# Patient Record
Sex: FEMALE | Race: OTHER
[De-identification: names, ages, dates, MRNs, and addresses within clinical notes are randomized per-mention and may not be internally consistent; named-entity substitution may affect disease eponyms.]

---

## 2017-04-13 ENCOUNTER — RESULT REVIEW (OUTPATIENT)
Age: 22
End: 2017-04-13

## 2017-04-13 ENCOUNTER — INPATIENT (INPATIENT)
Facility: HOSPITAL | Age: 22
LOS: 1 days | Discharge: PSYCHIATRIC OR SUBSTANCE USE FACILITY | End: 2017-04-15
Attending: SURGERY | Admitting: SURGERY
Payer: MEDICAID

## 2017-04-13 VITALS
RESPIRATION RATE: 18 BRPM | DIASTOLIC BLOOD PRESSURE: 67 MMHG | OXYGEN SATURATION: 100 % | SYSTOLIC BLOOD PRESSURE: 125 MMHG | TEMPERATURE: 98 F | HEART RATE: 100 BPM

## 2017-04-13 NOTE — ED ADULT TRIAGE NOTE - TEMPERATURE IN FAHRENHEIT (DEGREES F)
"Chief Complaint   Patient presents with     A.D.H.D     medication follow up     UTI     UTI concern x4 day ago, frequency urinating.        Initial /70 (BP Location: Left arm, Patient Position: Chair)  Pulse 64  Temp 97.5  F (36.4  C) (Oral)  Ht 5' 4.76\" (1.645 m)  Wt 253 lb 6.4 oz (114.9 kg)  LMP 03/27/2017  BMI 42.48 kg/m2 Estimated body mass index is 42.48 kg/(m^2) as calculated from the following:    Height as of this encounter: 5' 4.76\" (1.645 m).    Weight as of this encounter: 253 lb 6.4 oz (114.9 kg).  Medication Reconciliation: complete    " 98.2

## 2017-04-13 NOTE — ED ADULT TRIAGE NOTE - CHIEF COMPLAINT QUOTE
Pt brought in by [facility] from [facility], c/o sudden onset of RUQ abdominal pain x3 hours acc with vomiting x1. Denies fever/chills. Denies diarrhea. Staff members at bedside from [facility].

## 2017-04-14 DIAGNOSIS — K81.0 ACUTE CHOLECYSTITIS: ICD-10-CM

## 2017-04-14 LAB
ALBUMIN SERPL ELPH-MCNC: 4 G/DL — SIGNIFICANT CHANGE UP (ref 3.3–5)
ALP SERPL-CCNC: 91 U/L — SIGNIFICANT CHANGE UP (ref 40–120)
ALT FLD-CCNC: 20 U/L — SIGNIFICANT CHANGE UP (ref 4–33)
APPEARANCE UR: CLEAR — SIGNIFICANT CHANGE UP
APTT BLD: 36.6 SEC — SIGNIFICANT CHANGE UP (ref 27.5–37.4)
AST SERPL-CCNC: 21 U/L — SIGNIFICANT CHANGE UP (ref 4–32)
BASOPHILS # BLD AUTO: 0.01 K/UL — SIGNIFICANT CHANGE UP (ref 0–0.2)
BASOPHILS NFR BLD AUTO: 0.2 % — SIGNIFICANT CHANGE UP (ref 0–2)
BILIRUB SERPL-MCNC: 0.2 MG/DL — SIGNIFICANT CHANGE UP (ref 0.2–1.2)
BILIRUB UR-MCNC: NEGATIVE — SIGNIFICANT CHANGE UP
BLD GP AB SCN SERPL QL: NEGATIVE — SIGNIFICANT CHANGE UP
BLOOD UR QL VISUAL: NEGATIVE — SIGNIFICANT CHANGE UP
BUN SERPL-MCNC: 10 MG/DL — SIGNIFICANT CHANGE UP (ref 7–23)
CALCIUM SERPL-MCNC: 9.1 MG/DL — SIGNIFICANT CHANGE UP (ref 8.4–10.5)
CHLORIDE SERPL-SCNC: 102 MMOL/L — SIGNIFICANT CHANGE UP (ref 98–107)
CO2 SERPL-SCNC: 25 MMOL/L — SIGNIFICANT CHANGE UP (ref 22–31)
COLOR SPEC: YELLOW — SIGNIFICANT CHANGE UP
CREAT SERPL-MCNC: 0.53 MG/DL — SIGNIFICANT CHANGE UP (ref 0.5–1.3)
EOSINOPHIL # BLD AUTO: 0 K/UL — SIGNIFICANT CHANGE UP (ref 0–0.5)
EOSINOPHIL NFR BLD AUTO: 0 % — SIGNIFICANT CHANGE UP (ref 0–6)
GLUCOSE SERPL-MCNC: 103 MG/DL — HIGH (ref 70–99)
GLUCOSE UR-MCNC: NEGATIVE — SIGNIFICANT CHANGE UP
HCT VFR BLD CALC: 35.8 % — SIGNIFICANT CHANGE UP (ref 34.5–45)
HGB BLD-MCNC: 11.3 G/DL — LOW (ref 11.5–15.5)
IMM GRANULOCYTES NFR BLD AUTO: 0 % — SIGNIFICANT CHANGE UP (ref 0–1.5)
INR BLD: 1 — SIGNIFICANT CHANGE UP (ref 0.88–1.17)
KETONES UR-MCNC: NEGATIVE — SIGNIFICANT CHANGE UP
LEUKOCYTE ESTERASE UR-ACNC: NEGATIVE — SIGNIFICANT CHANGE UP
LIDOCAIN IGE QN: 27 U/L — SIGNIFICANT CHANGE UP (ref 7–60)
LYMPHOCYTES # BLD AUTO: 2.16 K/UL — SIGNIFICANT CHANGE UP (ref 1–3.3)
LYMPHOCYTES # BLD AUTO: 45.1 % — HIGH (ref 13–44)
MCHC RBC-ENTMCNC: 24.3 PG — LOW (ref 27–34)
MCHC RBC-ENTMCNC: 31.6 % — LOW (ref 32–36)
MCV RBC AUTO: 77 FL — LOW (ref 80–100)
MONOCYTES # BLD AUTO: 0.5 K/UL — SIGNIFICANT CHANGE UP (ref 0–0.9)
MONOCYTES NFR BLD AUTO: 10.4 % — SIGNIFICANT CHANGE UP (ref 2–14)
NEUTROPHILS # BLD AUTO: 2.12 K/UL — SIGNIFICANT CHANGE UP (ref 1.8–7.4)
NEUTROPHILS NFR BLD AUTO: 44.3 % — SIGNIFICANT CHANGE UP (ref 43–77)
NITRITE UR-MCNC: NEGATIVE — SIGNIFICANT CHANGE UP
PH UR: 6.5 — SIGNIFICANT CHANGE UP (ref 4.6–8)
PLATELET # BLD AUTO: 231 K/UL — SIGNIFICANT CHANGE UP (ref 150–400)
PMV BLD: 11.4 FL — SIGNIFICANT CHANGE UP (ref 7–13)
POTASSIUM SERPL-MCNC: 4.1 MMOL/L — SIGNIFICANT CHANGE UP (ref 3.5–5.3)
POTASSIUM SERPL-SCNC: 4.1 MMOL/L — SIGNIFICANT CHANGE UP (ref 3.5–5.3)
PROT SERPL-MCNC: 7.2 G/DL — SIGNIFICANT CHANGE UP (ref 6–8.3)
PROT UR-MCNC: 10 — SIGNIFICANT CHANGE UP
PROTHROM AB SERPL-ACNC: 11.2 SEC — SIGNIFICANT CHANGE UP (ref 9.8–13.1)
RBC # BLD: 4.65 M/UL — SIGNIFICANT CHANGE UP (ref 3.8–5.2)
RBC # FLD: 16.4 % — HIGH (ref 10.3–14.5)
RBC CASTS # UR COMP ASSIST: SIGNIFICANT CHANGE UP (ref 0–?)
RH IG SCN BLD-IMP: POSITIVE — SIGNIFICANT CHANGE UP
SODIUM SERPL-SCNC: 142 MMOL/L — SIGNIFICANT CHANGE UP (ref 135–145)
SP GR SPEC: 1.01 — SIGNIFICANT CHANGE UP (ref 1–1.03)
UROBILINOGEN FLD QL: NORMAL E.U. — SIGNIFICANT CHANGE UP (ref 0.1–0.2)
VALPROATE SERPL-MCNC: 32.8 UG/ML — LOW (ref 50–100)
WBC # BLD: 4.79 K/UL — SIGNIFICANT CHANGE UP (ref 3.8–10.5)
WBC # FLD AUTO: 4.79 K/UL — SIGNIFICANT CHANGE UP (ref 3.8–10.5)
WBC UR QL: SIGNIFICANT CHANGE UP (ref 0–?)

## 2017-04-14 PROCEDURE — 90792 PSYCH DIAG EVAL W/MED SRVCS: CPT

## 2017-04-14 PROCEDURE — 76705 ECHO EXAM OF ABDOMEN: CPT | Mod: 26

## 2017-04-14 PROCEDURE — 99223 1ST HOSP IP/OBS HIGH 75: CPT | Mod: 57,GC

## 2017-04-14 PROCEDURE — 47562 LAPAROSCOPIC CHOLECYSTECTOMY: CPT

## 2017-04-14 PROCEDURE — 93010 ELECTROCARDIOGRAM REPORT: CPT

## 2017-04-14 PROCEDURE — 88304 TISSUE EXAM BY PATHOLOGIST: CPT | Mod: 26

## 2017-04-14 RX ORDER — OXYCODONE HYDROCHLORIDE 5 MG/1
5 TABLET ORAL EVERY 4 HOURS
Qty: 0 | Refills: 0 | Status: DISCONTINUED | OUTPATIENT
Start: 2017-04-14 | End: 2017-04-15

## 2017-04-14 RX ORDER — HALOPERIDOL DECANOATE 100 MG/ML
5 INJECTION INTRAMUSCULAR EVERY 6 HOURS
Qty: 0 | Refills: 0 | Status: DISCONTINUED | OUTPATIENT
Start: 2017-04-14 | End: 2017-04-15

## 2017-04-14 RX ORDER — MORPHINE SULFATE 50 MG/1
2 CAPSULE, EXTENDED RELEASE ORAL EVERY 4 HOURS
Qty: 0 | Refills: 0 | Status: DISCONTINUED | OUTPATIENT
Start: 2017-04-14 | End: 2017-04-14

## 2017-04-14 RX ORDER — SODIUM CHLORIDE 9 MG/ML
1000 INJECTION INTRAMUSCULAR; INTRAVENOUS; SUBCUTANEOUS ONCE
Qty: 0 | Refills: 0 | Status: COMPLETED | OUTPATIENT
Start: 2017-04-14 | End: 2017-04-14

## 2017-04-14 RX ORDER — PIPERACILLIN AND TAZOBACTAM 4; .5 G/20ML; G/20ML
3.38 INJECTION, POWDER, LYOPHILIZED, FOR SOLUTION INTRAVENOUS ONCE
Qty: 0 | Refills: 0 | Status: COMPLETED | OUTPATIENT
Start: 2017-04-14 | End: 2017-04-14

## 2017-04-14 RX ORDER — PYRIDOXINE HCL (VITAMIN B6) 100 MG
50 TABLET ORAL DAILY
Qty: 0 | Refills: 0 | Status: DISCONTINUED | OUTPATIENT
Start: 2017-04-14 | End: 2017-04-14

## 2017-04-14 RX ORDER — THIAMINE MONONITRATE (VIT B1) 100 MG
100 TABLET ORAL DAILY
Qty: 0 | Refills: 0 | Status: DISCONTINUED | OUTPATIENT
Start: 2017-04-14 | End: 2017-04-14

## 2017-04-14 RX ORDER — SALICYLIC ACID 0.5 %
1 CLEANSER (GRAM) TOPICAL EVERY 6 HOURS
Qty: 0 | Refills: 0 | Status: DISCONTINUED | OUTPATIENT
Start: 2017-04-14 | End: 2017-04-14

## 2017-04-14 RX ORDER — MORPHINE SULFATE 50 MG/1
4 CAPSULE, EXTENDED RELEASE ORAL EVERY 4 HOURS
Qty: 0 | Refills: 0 | Status: DISCONTINUED | OUTPATIENT
Start: 2017-04-14 | End: 2017-04-14

## 2017-04-14 RX ORDER — ONDANSETRON 8 MG/1
4 TABLET, FILM COATED ORAL ONCE
Qty: 0 | Refills: 0 | Status: COMPLETED | OUTPATIENT
Start: 2017-04-14 | End: 2017-04-14

## 2017-04-14 RX ORDER — ACETAMINOPHEN 500 MG
1000 TABLET ORAL ONCE
Qty: 0 | Refills: 0 | Status: COMPLETED | OUTPATIENT
Start: 2017-04-14 | End: 2017-04-14

## 2017-04-14 RX ORDER — OXYCODONE HYDROCHLORIDE 5 MG/1
10 TABLET ORAL EVERY 4 HOURS
Qty: 0 | Refills: 0 | Status: DISCONTINUED | OUTPATIENT
Start: 2017-04-14 | End: 2017-04-15

## 2017-04-14 RX ORDER — LEVOTHYROXINE SODIUM 125 MCG
87 TABLET ORAL DAILY
Qty: 0 | Refills: 0 | Status: DISCONTINUED | OUTPATIENT
Start: 2017-04-14 | End: 2017-04-14

## 2017-04-14 RX ORDER — ENOXAPARIN SODIUM 100 MG/ML
40 INJECTION SUBCUTANEOUS DAILY
Qty: 0 | Refills: 0 | Status: DISCONTINUED | OUTPATIENT
Start: 2017-04-14 | End: 2017-04-15

## 2017-04-14 RX ORDER — SODIUM CHLORIDE 9 MG/ML
1000 INJECTION, SOLUTION INTRAVENOUS
Qty: 0 | Refills: 0 | Status: DISCONTINUED | OUTPATIENT
Start: 2017-04-14 | End: 2017-04-15

## 2017-04-14 RX ORDER — NICOTINE POLACRILEX 2 MG
2 GUM BUCCAL EVERY 6 HOURS
Qty: 0 | Refills: 0 | Status: DISCONTINUED | OUTPATIENT
Start: 2017-04-14 | End: 2017-04-15

## 2017-04-14 RX ORDER — ENOXAPARIN SODIUM 100 MG/ML
40 INJECTION SUBCUTANEOUS DAILY
Qty: 0 | Refills: 0 | Status: DISCONTINUED | OUTPATIENT
Start: 2017-04-14 | End: 2017-04-14

## 2017-04-14 RX ORDER — SALICYLIC ACID 0.5 %
1 CLEANSER (GRAM) TOPICAL EVERY 6 HOURS
Qty: 0 | Refills: 0 | Status: DISCONTINUED | OUTPATIENT
Start: 2017-04-14 | End: 2017-04-15

## 2017-04-14 RX ORDER — HYDROMORPHONE HYDROCHLORIDE 2 MG/ML
0.5 INJECTION INTRAMUSCULAR; INTRAVENOUS; SUBCUTANEOUS
Qty: 0 | Refills: 0 | Status: DISCONTINUED | OUTPATIENT
Start: 2017-04-14 | End: 2017-04-15

## 2017-04-14 RX ORDER — CLOZAPINE 150 MG/1
200 TABLET, ORALLY DISINTEGRATING ORAL AT BEDTIME
Qty: 0 | Refills: 0 | Status: DISCONTINUED | OUTPATIENT
Start: 2017-04-14 | End: 2017-04-14

## 2017-04-14 RX ORDER — DIVALPROEX SODIUM 500 MG/1
500 TABLET, DELAYED RELEASE ORAL DAILY
Qty: 0 | Refills: 0 | Status: DISCONTINUED | OUTPATIENT
Start: 2017-04-14 | End: 2017-04-15

## 2017-04-14 RX ORDER — PIPERACILLIN AND TAZOBACTAM 4; .5 G/20ML; G/20ML
3.38 INJECTION, POWDER, LYOPHILIZED, FOR SOLUTION INTRAVENOUS EVERY 8 HOURS
Qty: 0 | Refills: 0 | Status: DISCONTINUED | OUTPATIENT
Start: 2017-04-14 | End: 2017-04-14

## 2017-04-14 RX ORDER — CLOZAPINE 150 MG/1
200 TABLET, ORALLY DISINTEGRATING ORAL AT BEDTIME
Qty: 0 | Refills: 0 | Status: DISCONTINUED | OUTPATIENT
Start: 2017-04-14 | End: 2017-04-15

## 2017-04-14 RX ORDER — ONDANSETRON 8 MG/1
4 TABLET, FILM COATED ORAL ONCE
Qty: 0 | Refills: 0 | Status: DISCONTINUED | OUTPATIENT
Start: 2017-04-14 | End: 2017-04-15

## 2017-04-14 RX ORDER — MORPHINE SULFATE 50 MG/1
4 CAPSULE, EXTENDED RELEASE ORAL ONCE
Qty: 0 | Refills: 0 | Status: DISCONTINUED | OUTPATIENT
Start: 2017-04-14 | End: 2017-04-14

## 2017-04-14 RX ORDER — LEVOTHYROXINE SODIUM 125 MCG
175 TABLET ORAL DAILY
Qty: 0 | Refills: 0 | Status: DISCONTINUED | OUTPATIENT
Start: 2017-04-14 | End: 2017-04-15

## 2017-04-14 RX ORDER — DIVALPROEX SODIUM 500 MG/1
500 TABLET, DELAYED RELEASE ORAL DAILY
Qty: 0 | Refills: 0 | Status: DISCONTINUED | OUTPATIENT
Start: 2017-04-14 | End: 2017-04-14

## 2017-04-14 RX ORDER — HEXAVITAMINS
300 TABLET ORAL DAILY
Qty: 0 | Refills: 0 | Status: DISCONTINUED | OUTPATIENT
Start: 2017-04-14 | End: 2017-04-15

## 2017-04-14 RX ORDER — THIAMINE MONONITRATE (VIT B1) 100 MG
100 TABLET ORAL DAILY
Qty: 0 | Refills: 0 | Status: DISCONTINUED | OUTPATIENT
Start: 2017-04-14 | End: 2017-04-15

## 2017-04-14 RX ORDER — ACETAMINOPHEN 500 MG
650 TABLET ORAL EVERY 6 HOURS
Qty: 0 | Refills: 0 | Status: DISCONTINUED | OUTPATIENT
Start: 2017-04-14 | End: 2017-04-15

## 2017-04-14 RX ORDER — PYRIDOXINE HCL (VITAMIN B6) 100 MG
50 TABLET ORAL DAILY
Qty: 0 | Refills: 0 | Status: DISCONTINUED | OUTPATIENT
Start: 2017-04-14 | End: 2017-04-15

## 2017-04-14 RX ADMIN — SODIUM CHLORIDE 125 MILLILITER(S): 9 INJECTION, SOLUTION INTRAVENOUS at 12:45

## 2017-04-14 RX ADMIN — SODIUM CHLORIDE 1000 MILLILITER(S): 9 INJECTION INTRAMUSCULAR; INTRAVENOUS; SUBCUTANEOUS at 03:18

## 2017-04-14 RX ADMIN — Medication 100 MILLIGRAM(S): at 14:55

## 2017-04-14 RX ADMIN — Medication 400 MILLIGRAM(S): at 19:15

## 2017-04-14 RX ADMIN — SODIUM CHLORIDE 125 MILLILITER(S): 9 INJECTION, SOLUTION INTRAVENOUS at 07:33

## 2017-04-14 RX ADMIN — Medication 1000 MILLIGRAM(S): at 20:00

## 2017-04-14 RX ADMIN — CLOZAPINE 200 MILLIGRAM(S): 150 TABLET, ORALLY DISINTEGRATING ORAL at 22:30

## 2017-04-14 RX ADMIN — Medication 50 MILLIGRAM(S): at 14:55

## 2017-04-14 RX ADMIN — MORPHINE SULFATE 4 MILLIGRAM(S): 50 CAPSULE, EXTENDED RELEASE ORAL at 04:00

## 2017-04-14 RX ADMIN — Medication 300 MILLIGRAM(S): at 14:54

## 2017-04-14 RX ADMIN — SODIUM CHLORIDE 125 MILLILITER(S): 9 INJECTION, SOLUTION INTRAVENOUS at 19:20

## 2017-04-14 RX ADMIN — Medication 1 APPLICATION(S): at 23:15

## 2017-04-14 RX ADMIN — PIPERACILLIN AND TAZOBACTAM 200 GRAM(S): 4; .5 INJECTION, POWDER, LYOPHILIZED, FOR SOLUTION INTRAVENOUS at 06:02

## 2017-04-14 RX ADMIN — OXYCODONE HYDROCHLORIDE 10 MILLIGRAM(S): 5 TABLET ORAL at 23:10

## 2017-04-14 RX ADMIN — MORPHINE SULFATE 4 MILLIGRAM(S): 50 CAPSULE, EXTENDED RELEASE ORAL at 03:17

## 2017-04-14 RX ADMIN — SODIUM CHLORIDE 125 MILLILITER(S): 9 INJECTION, SOLUTION INTRAVENOUS at 22:31

## 2017-04-14 RX ADMIN — ONDANSETRON 4 MILLIGRAM(S): 8 TABLET, FILM COATED ORAL at 03:18

## 2017-04-14 RX ADMIN — OXYCODONE HYDROCHLORIDE 10 MILLIGRAM(S): 5 TABLET ORAL at 22:30

## 2017-04-14 RX ADMIN — PIPERACILLIN AND TAZOBACTAM 25 GRAM(S): 4; .5 INJECTION, POWDER, LYOPHILIZED, FOR SOLUTION INTRAVENOUS at 12:46

## 2017-04-14 RX ADMIN — ENOXAPARIN SODIUM 40 MILLIGRAM(S): 100 INJECTION SUBCUTANEOUS at 12:44

## 2017-04-14 RX ADMIN — Medication 87 MICROGRAM(S): at 12:44

## 2017-04-14 RX ADMIN — DIVALPROEX SODIUM 500 MILLIGRAM(S): 500 TABLET, DELAYED RELEASE ORAL at 12:45
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
## 2017-04-14 NOTE — BRIEF OPERATIVE NOTE - OPERATION/FINDINGS
Procedure: Laparoscopic cholecystectomy    Findings: An infraumbilical incision was made and carried through the fascia. The abdomen was entered and insufflated. Additional trocars were placed in the subxiphoid and RUQ. The cystic duct and artery were ligated and divided. Gallbladder was removed. There was some bile spillage. Fascia and skin were then closed.

## 2017-04-14 NOTE — ED ADULT NURSE REASSESSMENT NOTE - NS ED NURSE REASSESS COMMENT FT1
pt a&o x3, no c/o pain. fluids infusing. pre-op labs sent. nad noted. surgical resident at bedside for assessment

## 2017-04-14 NOTE — ED PROVIDER NOTE - MEDICAL DECISION MAKING DETAILS
att20 y/o f with h/o asthma, hypoparathyroidism, hypothyroid, obesity, pre-DM, PTSD, s/p thyroid surgery, from [facility], presents with RUQ abd pain x 3 hrs, vomiting. No diarrhea. No dysuria. No fever. Recently tx for uti with nitrofurantan. on depakote. Exam as above, consistent with murphys, r/u acute maci with US, labs, ua, preg, and reassessment. Pain control and ivf. att22 y/o f with h/o asthma, hypoparathyroidism, hypothyroid, obesity, pre-DM, PTSD, s/p thyroid surgery, from [facility], presents with RUQ abd pain x 3 hrs, vomiting. No diarrhea. No dysuria. No fever. Recently tx for uti with nitrofurantan. on depakote. Exam as above, consistent with murphys, r/u acute maci with US, labs, ua, preg, and reassessment. Pain control and ivf. Still with pain, US read, discussed with surgery, abtx, admission

## 2017-04-14 NOTE — H&P ADULT. - HISTORY OF PRESENT ILLNESS
21F with 12 hrs of RUQ pain after dinner and playing basketball associated with nausea and vomiting once. 21F with 12 hrs of RUQ pain after dinner and playing basketball associated with nausea and vomiting once.  The pain is sharp and dull RUQ pain that is unrelenting since last night.  Morphine helped.  It does not radiate.  She has had similar pains before and has known she has had galstones since she was 18.  However, she was pregnant at that time and they were going to perform an elective cholecystectomy.  She is currently planning on future outpatient cholecystectomy at [facility].

## 2017-04-14 NOTE — ED ADULT NURSE NOTE - CHIEF COMPLAINT QUOTE
Pt brought in by [facility] from [facility], c/o sudden onset of RUQ abdominal pain x3 hours acc with vomiting x1. Denies fever/chills. Denies diarrhea. Staff members at bedside from [facility].

## 2017-04-14 NOTE — ED ADULT NURSE NOTE - OBJECTIVE STATEMENT
Pt received in room 29, a/o x3. Pt comes in with c/o right side abd pain that started since last night around 7p. Pt reports the pain just begna suddenly and she had n/v prior to coming to hospital. Pt in no acute distress, reports pain 4/10 and vs as noted. IV access placed, labs sent and pt awaiting US. PT medicated per MD order and NS infusing. Will monitor and await further orders.

## 2017-04-14 NOTE — ED PROVIDER NOTE - PROGRESS NOTE DETAILS
Pt feels better after morphine. R/p abd exam performed. Pt  in RUQ and epigastrium. US read pending.

## 2017-04-14 NOTE — H&P ADULT. - PMH
Asthma    Bipolar disorder, currently in remission    Hypoparathyroidism    Hypothyroid    Obesity    Pre-diabetes    PTSD (post-traumatic stress disorder)

## 2017-04-14 NOTE — H&P ADULT. - ASSESSMENT
21F with acute cholecystitis  -admit to surgery, Dr. Ashley  -NPO/IVF  -IV abx  -DVT ppx  -continue home seizure and mood disorder meds  -continue levothyroxine  -pain control

## 2017-04-14 NOTE — ED PROVIDER NOTE - PMH
Asthma    Hypoparathyroidism    Hypothyroid    Obesity    Pre-diabetes    PTSD (post-traumatic stress disorder)

## 2017-04-14 NOTE — ED PROVIDER NOTE - OBJECTIVE STATEMENT
att22 y/o f with h/o asthma, hypoparathyroidism, hypothyroid, obesity, pre-DM, PTSD, s/p thyroid surgery, from [facility], presents with RUQ abd pain x 3 hrs, vomiting. No diarrhea. No dysuria. No fever. Recently tx for uti with nitrofurantan. on depakote. att22 y/o f with h/o asthma, hypoparathyroidism, hypothyroid, obesity, pre-DM, PTSD, s/p thyroid surgery, from [facility], presents with RUQ abd pain x 3 hrs, vomiting. No diarrhea. No dysuria. No fever. Recently tx for uti with nitrofurantan. on depakote. Pt vomited x 1, NBNB. Pain radiates to the back. No hx of abd surgery. Hx of gallstones.

## 2017-04-15 ENCOUNTER — TRANSCRIPTION ENCOUNTER (OUTPATIENT)
Age: 22
End: 2017-04-15

## 2017-04-15 VITALS
HEART RATE: 103 BPM | OXYGEN SATURATION: 96 % | RESPIRATION RATE: 16 BRPM | TEMPERATURE: 98 F | DIASTOLIC BLOOD PRESSURE: 63 MMHG | SYSTOLIC BLOOD PRESSURE: 118 MMHG

## 2017-04-15 LAB
ALBUMIN SERPL ELPH-MCNC: 3.8 G/DL — SIGNIFICANT CHANGE UP (ref 3.3–5)
ALP SERPL-CCNC: 86 U/L — SIGNIFICANT CHANGE UP (ref 40–120)
ALT FLD-CCNC: 48 U/L — HIGH (ref 4–33)
AST SERPL-CCNC: 74 U/L — HIGH (ref 4–32)
BILIRUB SERPL-MCNC: < 0.2 MG/DL — LOW (ref 0.2–1.2)
BUN SERPL-MCNC: 5 MG/DL — LOW (ref 7–23)
CALCIUM SERPL-MCNC: 9 MG/DL — SIGNIFICANT CHANGE UP (ref 8.4–10.5)
CHLORIDE SERPL-SCNC: 100 MMOL/L — SIGNIFICANT CHANGE UP (ref 98–107)
CO2 SERPL-SCNC: 24 MMOL/L — SIGNIFICANT CHANGE UP (ref 22–31)
CREAT SERPL-MCNC: 0.62 MG/DL — SIGNIFICANT CHANGE UP (ref 0.5–1.3)
GLUCOSE SERPL-MCNC: 195 MG/DL — HIGH (ref 70–99)
HCT VFR BLD CALC: 34.6 % — SIGNIFICANT CHANGE UP (ref 34.5–45)
HGB BLD-MCNC: 11.6 G/DL — SIGNIFICANT CHANGE UP (ref 11.5–15.5)
MCHC RBC-ENTMCNC: 25.8 PG — LOW (ref 27–34)
MCHC RBC-ENTMCNC: 33.5 % — SIGNIFICANT CHANGE UP (ref 32–36)
MCV RBC AUTO: 76.9 FL — LOW (ref 80–100)
PLATELET # BLD AUTO: 238 K/UL — SIGNIFICANT CHANGE UP (ref 150–400)
PMV BLD: 10.9 FL — SIGNIFICANT CHANGE UP (ref 7–13)
POTASSIUM SERPL-MCNC: 4.5 MMOL/L — SIGNIFICANT CHANGE UP (ref 3.5–5.3)
POTASSIUM SERPL-SCNC: 4.5 MMOL/L — SIGNIFICANT CHANGE UP (ref 3.5–5.3)
PROT SERPL-MCNC: 7.1 G/DL — SIGNIFICANT CHANGE UP (ref 6–8.3)
RBC # BLD: 4.5 M/UL — SIGNIFICANT CHANGE UP (ref 3.8–5.2)
RBC # FLD: 16.5 % — HIGH (ref 10.3–14.5)
SODIUM SERPL-SCNC: 141 MMOL/L — SIGNIFICANT CHANGE UP (ref 135–145)
WBC # BLD: 6.85 K/UL — SIGNIFICANT CHANGE UP (ref 3.8–10.5)
WBC # FLD AUTO: 6.85 K/UL — SIGNIFICANT CHANGE UP (ref 3.8–10.5)

## 2017-04-15 RX ORDER — IBUPROFEN 200 MG
600 TABLET ORAL EVERY 8 HOURS
Qty: 0 | Refills: 0 | Status: DISCONTINUED | OUTPATIENT
Start: 2017-04-15 | End: 2017-04-15

## 2017-04-15 RX ORDER — OXYCODONE HYDROCHLORIDE 5 MG/1
5 TABLET ORAL EVERY 6 HOURS
Qty: 0 | Refills: 0 | Status: DISCONTINUED | OUTPATIENT
Start: 2017-04-15 | End: 2017-04-15

## 2017-04-15 RX ORDER — OXYCODONE HYDROCHLORIDE 5 MG/1
1 TABLET ORAL
Qty: 16 | Refills: 0 | OUTPATIENT
Start: 2017-04-15 | End: 2017-04-19

## 2017-04-15 RX ADMIN — Medication 175 MICROGRAM(S): at 05:53

## 2017-04-15 RX ADMIN — Medication 1 APPLICATION(S): at 11:38

## 2017-04-15 RX ADMIN — OXYCODONE HYDROCHLORIDE 5 MILLIGRAM(S): 5 TABLET ORAL at 17:11

## 2017-04-15 RX ADMIN — Medication 100 MILLIGRAM(S): at 11:37

## 2017-04-15 RX ADMIN — Medication 50 MILLIGRAM(S): at 11:37

## 2017-04-15 RX ADMIN — OXYCODONE HYDROCHLORIDE 10 MILLIGRAM(S): 5 TABLET ORAL at 02:45

## 2017-04-15 RX ADMIN — Medication 600 MILLIGRAM(S): at 14:01

## 2017-04-15 RX ADMIN — DIVALPROEX SODIUM 500 MILLIGRAM(S): 500 TABLET, DELAYED RELEASE ORAL at 11:37

## 2017-04-15 RX ADMIN — OXYCODONE HYDROCHLORIDE 10 MILLIGRAM(S): 5 TABLET ORAL at 02:01

## 2017-04-15 RX ADMIN — OXYCODONE HYDROCHLORIDE 5 MILLIGRAM(S): 5 TABLET ORAL at 10:37

## 2017-04-15 RX ADMIN — Medication 1 APPLICATION(S): at 05:55

## 2017-04-15 RX ADMIN — ENOXAPARIN SODIUM 40 MILLIGRAM(S): 100 INJECTION SUBCUTANEOUS at 11:37

## 2017-04-15 RX ADMIN — Medication 300 MILLIGRAM(S): at 11:38

## 2017-04-15 RX ADMIN — OXYCODONE HYDROCHLORIDE 5 MILLIGRAM(S): 5 TABLET ORAL at 09:51

## 2017-04-15 NOTE — DISCHARGE NOTE ADULT - ADDITIONAL INSTRUCTIONS
Please follow up with Dr. Ashley within 1-2 weeks. You may call [phone] to schedule an appointment.    You may resume a regular diet and regular activities. Please do not participate in heavy lifting or strenuous exercise. Do not drive while taking pain medication.    Please go to the Emergency Department if you experience pain not controlled by pain medication, bleeding that will not stop, fevers or chills.

## 2017-04-15 NOTE — DISCHARGE NOTE ADULT - MEDICATION SUMMARY - MEDICATIONS TO TAKE
I will START or STAY ON the medications listed below when I get home from the hospital:    Depakote 500 mg oral delayed release tablet  -- 1 tab(s) by mouth once a day  -- Indication: For Anticonvulsant    Benadryl 50 mg oral capsule  --  by mouth once a day (at bedtime), As Needed insomnia  -- Indication: For Insomnia    cloZAPine 200 mg oral tablet  -- 1 tab(s) by mouth once a day (at bedtime)  -- Scheduled to stop on 4/15/17 - f/u w/ PCP  -- Indication: For Mood    isoniazid 300 mg oral tablet  -- 1 tab(s) by mouth once a day  -- TB prophylaxis  -- Indication: For TB Prophylaxis    Atarax  -- 50 milligram(s) by mouth every 8 hours, As Needed allergies  -- Indication: For Allergies    vitamin A & D topical cream  -- Apply on skin to affected area 4 times a day  -- Intertrigo  -- Indication: For Supplement    ferrous sulfate 325 mg (65 mg elemental iron) oral tablet  -- 1 tab(s) by mouth once a day  -- Take 2 hrs away from other meds  -- Indication: For Supplement    Colace  -- 200 milligram(s) by mouth 2 times a day  -- Indication: For Laxative    senna 8.6 mg oral tablet  -- 1 tab(s) by mouth 2 times a day  -- Indication: For Laxative    nicotine 2 mg oral transmucosal gum  -- 1 tab(s) oral transmucosal every 6 hours  -- Indication: For Smoking cessation    levothyroxine 175 mcg (0.175 mg) oral tablet  -- 1 tab(s) by mouth once a day  -- Indication: For Hypothyroidism    multivitamin  -- 1 tab(s) by mouth once a day  -- Indication: For Supplement    pyridoxine 50 mg oral tablet  -- 1 tab(s) by mouth once a day  -- Indication: For Supplement    thiamine 100 mg oral tablet  -- 1 tab(s) by mouth once a day  -- Indication: For Supplement

## 2017-04-15 NOTE — DISCHARGE NOTE ADULT - PATIENT PORTAL LINK FT
“You can access the Follow[employer] Patient Portal, offered by [facility], by registering with the following website: [email or web address]”

## 2017-04-15 NOTE — PROVIDER CONTACT NOTE (MEDICATION) - ACTION/TREATMENT ORDERED:
MD notified, Provider to RN ok to give pain medication early. MD instructed to retake BP in 30-45 minutes.

## 2017-04-15 NOTE — PROVIDER CONTACT NOTE (MEDICATION) - ASSESSMENT
Patient complaining of 9/10 pain. Patient is not due for pain medication for another 30 mins. Patient's HR was elevated 124.

## 2017-04-15 NOTE — DISCHARGE NOTE ADULT - PLAN OF CARE
Resolution of Pain Please follow up with Dr. Ashley within 1-2 weeks. You may call [phone] to schedule an appointment.    You may resume a regular diet and regular activities. Please do not participate in heavy lifting or strenuous exercise. Do not drive while taking pain medication.    Please go to the Emergency Department if you experience pain not controlled by pain medication, bleeding that will not stop, fevers or chills.

## 2017-04-15 NOTE — DISCHARGE NOTE ADULT - INSTRUCTIONS
You may resume a regular diet. discharged to [facility]. You may resume a regular diet. Please follow up with Dr. Ashley within 1-2 weeks. You may call [phone] to schedule an appointment. Please do not participate in heavy lifting or strenuous exercise.   Please go to the Emergency Department if you experience pain not controlled by pain medication, bleeding that will not stop, fevers or chills.

## 2017-04-15 NOTE — DISCHARGE NOTE ADULT - HOSPITAL COURSE
21 year old female, PMH Asthma, Bipolar disorder, Hypoparathyroidism, Hypothyroidism, Obesity, PTSD, presented to the Emergency Department with 12 hrs of RUQ pain after dinner and playing basketball associated with nausea and one episode of vomiting. The pain was located in the right lower quadrant but did not radiate. Patient had a history of gallstone and biliary colic and was previously offered an elective cholecystectomy. In the Emergency Department, she was treated with morphine, with some pain relief. Ultrasound showed multiple gallstones and acute cholecystitis. The patient was subsequently scheduled for laparoscopic cholecystectomy. On 4/14/17, patient underwent uncomplicated cholecystectomy. Following the operation, pain was controlled with oral pain medications and given a regular diet. On POD#1, the patient was deemed stable discharge.    At the time of discharge, the patient was hemodynamically stable, was tolerating PO diet, was voiding urine and passing stool, was ambulating, and was comfortable with adequate pain control. The patient was instructed to follow up with Dr. Ashley within 1-2 weeks after discharge from the hospital.

## 2017-04-15 NOTE — DISCHARGE NOTE ADULT - CARE PLAN
Principal Discharge DX:	Acute cholecystitis  Goal:	Resolution of Pain  Instructions for follow-up, activity and diet:	Please follow up with Dr. Ashley within 1-2 weeks. You may call [phone] to schedule an appointment.    You may resume a regular diet and regular activities. Please do not participate in heavy lifting or strenuous exercise. Do not drive while taking pain medication.    Please go to the Emergency Department if you experience pain not controlled by pain medication, bleeding that will not stop, fevers or chills. Principal Discharge DX:	Acute cholecystitis  Goal:	Resolution of Pain  Instructions for follow-up, activity and diet:	Please follow up with Dr. Ashley within 1-2 weeks. You may call [phone] to schedule an appointment.    You may resume a regular diet and regular activities. Please do not participate in heavy lifting or strenuous exercise. Do not drive while taking pain medication.    Please go to the Emergency Department if you experience pain not controlled by pain medication, bleeding that will not stop, fevers or chills.

## 2017-04-15 NOTE — DISCHARGE NOTE ADULT - CARE PROVIDER_API CALL
Mesha Ashley), DieticianNutrition; Surgery; Surgical Critical Care  [address] [place] [id number]  Phone: [phone]  Fax: [phone]

## 2017-04-15 NOTE — DISCHARGE NOTE ADULT - OTHER SIGNIFICANT FINDINGS
US: There are multiple gallstones within the gallbladder. Although the  gallbladder wall is not overly thickened, there is a suggestion of trace  pericholecystic fluid. In addition, there is a small echogenic focus along  the gallbladder neck/cystic duct which is not well evaluated due to body  habitus. Sonographic Giles sign was negative, however the patient  reportedly received pain medications. Findings raise the possibility of  cholecystitis, however HIDA is recommended for further evaluation    Hepatomegaly

## 2017-04-15 NOTE — DISCHARGE NOTE ADULT - NS AS ACTIVITY OBS
Do not drive while taking pain medication/Do not make important decisions/Do not drive or operate machinery/Walking-Outdoors allowed/Showering allowed/Walking-Indoors allowed

## 2017-04-20 ENCOUNTER — EMERGENCY (EMERGENCY)
Facility: HOSPITAL | Age: 22
LOS: 1 days | Discharge: HOME | End: 2017-04-20
Attending: EMERGENCY MEDICINE | Admitting: EMERGENCY MEDICINE
Payer: MEDICAID

## 2017-04-20 VITALS
RESPIRATION RATE: 16 BRPM | DIASTOLIC BLOOD PRESSURE: 52 MMHG | TEMPERATURE: 98 F | OXYGEN SATURATION: 99 % | SYSTOLIC BLOOD PRESSURE: 116 MMHG | HEART RATE: 117 BPM

## 2017-04-20 PROCEDURE — 76705 ECHO EXAM OF ABDOMEN: CPT | Mod: 26

## 2017-04-20 PROCEDURE — 99284 EMERGENCY DEPT VISIT MOD MDM: CPT

## 2017-04-20 PROCEDURE — 71020: CPT | Mod: 26

## 2017-04-20 RX ORDER — SODIUM CHLORIDE 9 MG/ML
1000 INJECTION INTRAMUSCULAR; INTRAVENOUS; SUBCUTANEOUS ONCE
Qty: 0 | Refills: 0 | Status: COMPLETED | OUTPATIENT
Start: 2017-04-20 | End: 2017-04-20

## 2017-04-20 RX ORDER — ONDANSETRON 8 MG/1
4 TABLET, FILM COATED ORAL ONCE
Qty: 0 | Refills: 0 | Status: COMPLETED | OUTPATIENT
Start: 2017-04-20 | End: 2017-04-20

## 2017-04-20 RX ORDER — MORPHINE SULFATE 50 MG/1
4 CAPSULE, EXTENDED RELEASE ORAL ONCE
Qty: 0 | Refills: 0 | Status: DISCONTINUED | OUTPATIENT
Start: 2017-04-20 | End: 2017-04-20

## 2017-04-20 RX ADMIN — MORPHINE SULFATE 4 MILLIGRAM(S): 50 CAPSULE, EXTENDED RELEASE ORAL at 23:39

## 2017-04-20 RX ADMIN — SODIUM CHLORIDE 1000 MILLILITER(S): 9 INJECTION INTRAMUSCULAR; INTRAVENOUS; SUBCUTANEOUS at 23:39

## 2017-04-20 RX ADMIN — ONDANSETRON 4 MILLIGRAM(S): 8 TABLET, FILM COATED ORAL at 23:39

## 2017-04-20 NOTE — ED PROVIDER NOTE - MEDICAL DECISION MAKING DETAILS
21F w/ RUQ pain x1 day, s/p cholecystectomy 6 days ago, concerning for retained stone vs post-surg pain vs pyelo  -labs, u/a, US, pain control

## 2017-04-20 NOTE — ED PROVIDER NOTE - OBJECTIVE STATEMENT
21F h/o asthma, Bipolar disorder, Hypoparathyroidism, Hypothyroidism, Obesity, PTSD presenting with abdominal pain. Started earlier today, RUQ, radiating to LUQ, 10/10, constant, associated with NBNB vomiting and watery diarrhea x3 each. Also on antibiotics for UTI diagnosed on 4 days ago. Pt had lap cholecystectomy on 4/14/16 at [facility] with Dr SERG Ashley. Antonia F, CP/SOB.

## 2017-04-20 NOTE — ED PROVIDER NOTE - CONSTITUTIONAL, MLM
normal... appears tired, awake, alert, oriented to person, place, time/situation and in no apparent distress.

## 2017-04-20 NOTE — ED ADULT TRIAGE NOTE - CHIEF COMPLAINT QUOTE
Pt sent from [facility] staff with pt. pt.st" I had gall stones removed last week...tonight I got very bad abd pains and vomiting x3...and diarreah x2." Pt appears very uncomfortable Pt sent from [place] [id number], staffx2 with pt. pt.st" I had gall stones removed last week...tonight I got very bad abd pains and vomiting x3...and diarreah x2." Pt appears very pale, very uncomfortable.

## 2017-04-20 NOTE — ED PROVIDER NOTE - PROGRESS NOTE DETAILS
Klepfish: Very well appearing, sleeping comfortably. CT no acute pathology other than possible urinary issue. UA being processed. Surgery requesting repeat LFTs to reassure that this is not retained stone. Tolerated PO in ED, ambulatory, pain improved, will d/c with pepcid and zofran and advise PMD f/u

## 2017-04-20 NOTE — ED ADULT NURSE NOTE - OBJECTIVE STATEMENT
22 yo female received in spot 26.  Pt is A&OX4.  F h/o asthma, Pt c/o abdominal pain. Started earlier today, RUQ, r10/10, States vomiting and watery diarrhea x3 each.  2 RNs unable to obtain IV.  MD aware.

## 2017-04-20 NOTE — ED ADULT NURSE NOTE - CHIEF COMPLAINT QUOTE
Pt sent from [place] [id number], staffx2 with pt. pt.st" I had gall stones removed last week...tonight I got very bad abd pains and vomiting x3...and diarreah x2." Pt appears very pale, very uncomfortable.

## 2017-04-20 NOTE — ED PROVIDER NOTE - ATTENDING CONTRIBUTION TO CARE
21F h/o asthma, Bipolar disorder, Hypoparathyroidism, Hypothyroidism, Obesity, PTSD presenting with abdominal pain. Started earlier today, RUQ, radiating to LUQ, 10/10, constant, associated with NBNB vomiting and watery diarrhea x3 each. Also on antibiotics for UTI diagnosed on 4 days ago. Pt had lap cholecystectomy on 4/14/16 at [facility] with Dr SERG Ashley. Denies F, CP/SOB. On exam: in pain. obese, lungs and heart normal. Abdo obese, healing lap maci scars, + moderate RUQ and epigastric tenderness. Plan: analgesia, antiemetic, IV fluid, d/w surgery, US abdomen 21F h/o asthma, Bipolar disorder, Hypoparathyroidism, Hypothyroidism, Obesity, PTSD presenting with abdominal pain. Started earlier today, RUQ, radiating to LUQ, 10/10, constant, associated with NBNB vomiting and watery diarrhea x3 each. Also on antibiotics for UTI diagnosed on 4 days ago. Pt had lap cholecystectomy on 4/14/16 at [facility] with Dr SERG Ashley. Denies F, CP/SOB. On exam: in pain. obese, lungs and heart normal. Abdo obese, healing lap maci scars, + moderate RUQ and epigastric tenderness. Plan: analgesia, antiemetic, IV fluid, surgery consulted on arrival, US abdomen ordered.

## 2017-04-21 VITALS
HEART RATE: 102 BPM | OXYGEN SATURATION: 99 % | DIASTOLIC BLOOD PRESSURE: 44 MMHG | RESPIRATION RATE: 17 BRPM | SYSTOLIC BLOOD PRESSURE: 116 MMHG

## 2017-04-21 LAB
ALBUMIN SERPL ELPH-MCNC: 3.8 G/DL — SIGNIFICANT CHANGE UP (ref 3.3–5)
ALBUMIN SERPL ELPH-MCNC: 4.7 G/DL — SIGNIFICANT CHANGE UP (ref 3.3–5)
ALP SERPL-CCNC: 107 U/L — SIGNIFICANT CHANGE UP (ref 40–120)
ALP SERPL-CCNC: 81 U/L — SIGNIFICANT CHANGE UP (ref 40–120)
ALT FLD-CCNC: 27 U/L — SIGNIFICANT CHANGE UP (ref 4–33)
ALT FLD-CCNC: 34 U/L — HIGH (ref 4–33)
APPEARANCE UR: CLEAR — SIGNIFICANT CHANGE UP
AST SERPL-CCNC: 16 U/L — SIGNIFICANT CHANGE UP (ref 4–32)
AST SERPL-CCNC: 24 U/L — SIGNIFICANT CHANGE UP (ref 4–32)
BASOPHILS # BLD AUTO: 0.01 K/UL — SIGNIFICANT CHANGE UP (ref 0–0.2)
BASOPHILS NFR BLD AUTO: 0.1 % — SIGNIFICANT CHANGE UP (ref 0–2)
BILIRUB DIRECT SERPL-MCNC: 0.1 MG/DL — SIGNIFICANT CHANGE UP (ref 0.1–0.2)
BILIRUB SERPL-MCNC: 0.3 MG/DL — SIGNIFICANT CHANGE UP (ref 0.2–1.2)
BILIRUB SERPL-MCNC: 0.3 MG/DL — SIGNIFICANT CHANGE UP (ref 0.2–1.2)
BILIRUB UR-MCNC: NEGATIVE — SIGNIFICANT CHANGE UP
BLOOD UR QL VISUAL: NEGATIVE — SIGNIFICANT CHANGE UP
BUN SERPL-MCNC: 9 MG/DL — SIGNIFICANT CHANGE UP (ref 7–23)
CALCIUM SERPL-MCNC: 9.8 MG/DL — SIGNIFICANT CHANGE UP (ref 8.4–10.5)
CHLORIDE SERPL-SCNC: 101 MMOL/L — SIGNIFICANT CHANGE UP (ref 98–107)
CO2 SERPL-SCNC: 24 MMOL/L — SIGNIFICANT CHANGE UP (ref 22–31)
COLOR SPEC: YELLOW — SIGNIFICANT CHANGE UP
CREAT SERPL-MCNC: 0.68 MG/DL — SIGNIFICANT CHANGE UP (ref 0.5–1.3)
EOSINOPHIL # BLD AUTO: 0 K/UL — SIGNIFICANT CHANGE UP (ref 0–0.5)
EOSINOPHIL NFR BLD AUTO: 0 % — SIGNIFICANT CHANGE UP (ref 0–6)
GLUCOSE SERPL-MCNC: 121 MG/DL — HIGH (ref 70–99)
GLUCOSE UR-MCNC: NEGATIVE — SIGNIFICANT CHANGE UP
HCG SERPL-ACNC: < 5 MIU/ML — SIGNIFICANT CHANGE UP
HCT VFR BLD CALC: 40.6 % — SIGNIFICANT CHANGE UP (ref 34.5–45)
HGB BLD-MCNC: 13 G/DL — SIGNIFICANT CHANGE UP (ref 11.5–15.5)
IMM GRANULOCYTES NFR BLD AUTO: 0.2 % — SIGNIFICANT CHANGE UP (ref 0–1.5)
KETONES UR-MCNC: NEGATIVE — SIGNIFICANT CHANGE UP
LEUKOCYTE ESTERASE UR-ACNC: NEGATIVE — SIGNIFICANT CHANGE UP
LIDOCAIN IGE QN: 20.5 U/L — SIGNIFICANT CHANGE UP (ref 7–60)
LYMPHOCYTES # BLD AUTO: 0.93 K/UL — LOW (ref 1–3.3)
LYMPHOCYTES # BLD AUTO: 8.8 % — LOW (ref 13–44)
MCHC RBC-ENTMCNC: 24.9 PG — LOW (ref 27–34)
MCHC RBC-ENTMCNC: 32 % — SIGNIFICANT CHANGE UP (ref 32–36)
MCV RBC AUTO: 77.6 FL — LOW (ref 80–100)
MONOCYTES # BLD AUTO: 0.9 K/UL — SIGNIFICANT CHANGE UP (ref 0–0.9)
MONOCYTES NFR BLD AUTO: 8.5 % — SIGNIFICANT CHANGE UP (ref 2–14)
MUCOUS THREADS # UR AUTO: SIGNIFICANT CHANGE UP
NEUTROPHILS # BLD AUTO: 8.71 K/UL — HIGH (ref 1.8–7.4)
NEUTROPHILS NFR BLD AUTO: 82.4 % — HIGH (ref 43–77)
NITRITE UR-MCNC: NEGATIVE — SIGNIFICANT CHANGE UP
PH UR: 6.5 — SIGNIFICANT CHANGE UP (ref 4.6–8)
PLATELET # BLD AUTO: 292 K/UL — SIGNIFICANT CHANGE UP (ref 150–400)
PMV BLD: 10.9 FL — SIGNIFICANT CHANGE UP (ref 7–13)
POTASSIUM SERPL-MCNC: 4.1 MMOL/L — SIGNIFICANT CHANGE UP (ref 3.5–5.3)
POTASSIUM SERPL-SCNC: 4.1 MMOL/L — SIGNIFICANT CHANGE UP (ref 3.5–5.3)
PROT SERPL-MCNC: 6.9 G/DL — SIGNIFICANT CHANGE UP (ref 6–8.3)
PROT SERPL-MCNC: 8.8 G/DL — HIGH (ref 6–8.3)
PROT UR-MCNC: 30 — HIGH
RBC # BLD: 5.23 M/UL — HIGH (ref 3.8–5.2)
RBC # FLD: 16.6 % — HIGH (ref 10.3–14.5)
RBC CASTS # UR COMP ASSIST: SIGNIFICANT CHANGE UP (ref 0–?)
SODIUM SERPL-SCNC: 140 MMOL/L — SIGNIFICANT CHANGE UP (ref 135–145)
SP GR SPEC: > 1.04 — HIGH (ref 1–1.03)
SQUAMOUS # UR AUTO: SIGNIFICANT CHANGE UP
UROBILINOGEN FLD QL: NORMAL E.U. — SIGNIFICANT CHANGE UP (ref 0.1–0.2)
WBC # BLD: 10.57 K/UL — HIGH (ref 3.8–10.5)
WBC # FLD AUTO: 10.57 K/UL — HIGH (ref 3.8–10.5)
WBC UR QL: SIGNIFICANT CHANGE UP (ref 0–?)

## 2017-04-21 PROCEDURE — 74177 CT ABD & PELVIS W/CONTRAST: CPT | Mod: 26

## 2017-04-21 RX ORDER — MORPHINE SULFATE 50 MG/1
4 CAPSULE, EXTENDED RELEASE ORAL ONCE
Qty: 0 | Refills: 0 | Status: DISCONTINUED | OUTPATIENT
Start: 2017-04-21 | End: 2017-04-21

## 2017-04-21 RX ORDER — SODIUM CHLORIDE 9 MG/ML
1000 INJECTION INTRAMUSCULAR; INTRAVENOUS; SUBCUTANEOUS ONCE
Qty: 0 | Refills: 0 | Status: COMPLETED | OUTPATIENT
Start: 2017-04-21 | End: 2017-04-21

## 2017-04-21 RX ADMIN — MORPHINE SULFATE 4 MILLIGRAM(S): 50 CAPSULE, EXTENDED RELEASE ORAL at 04:11

## 2017-04-21 RX ADMIN — MORPHINE SULFATE 4 MILLIGRAM(S): 50 CAPSULE, EXTENDED RELEASE ORAL at 00:11

## 2017-04-21 RX ADMIN — SODIUM CHLORIDE 2000 MILLILITER(S): 9 INJECTION INTRAMUSCULAR; INTRAVENOUS; SUBCUTANEOUS at 04:11

## 2017-04-22 LAB
BACTERIA UR CULT: SIGNIFICANT CHANGE UP
SPECIMEN SOURCE: SIGNIFICANT CHANGE UP

## 2017-04-27 ENCOUNTER — TRANSCRIPTION ENCOUNTER (OUTPATIENT)
Age: 22
End: 2017-04-27

## 2017-04-27 ENCOUNTER — APPOINTMENT (OUTPATIENT)
Dept: TRAUMA SURGERY | Facility: CLINIC | Age: 22
End: 2017-04-27

## 2017-04-27 VITALS
HEIGHT: 59 IN | WEIGHT: 235 LBS | BODY MASS INDEX: 47.37 KG/M2 | DIASTOLIC BLOOD PRESSURE: 80 MMHG | TEMPERATURE: 98.7 F | HEART RATE: 94 BPM | SYSTOLIC BLOOD PRESSURE: 115 MMHG

## 2017-06-02 ENCOUNTER — EMERGENCY (EMERGENCY)
Facility: HOSPITAL | Age: 22
LOS: 1 days | Discharge: HOME | End: 2017-06-02
Attending: EMERGENCY MEDICINE | Admitting: EMERGENCY MEDICINE
Payer: MEDICAID

## 2017-06-02 VITALS
TEMPERATURE: 98 F | RESPIRATION RATE: 18 BRPM | HEART RATE: 110 BPM | OXYGEN SATURATION: 100 % | DIASTOLIC BLOOD PRESSURE: 65 MMHG | SYSTOLIC BLOOD PRESSURE: 117 MMHG

## 2017-06-02 VITALS
TEMPERATURE: 98 F | RESPIRATION RATE: 18 BRPM | HEART RATE: 93 BPM | OXYGEN SATURATION: 100 % | SYSTOLIC BLOOD PRESSURE: 101 MMHG | DIASTOLIC BLOOD PRESSURE: 56 MMHG

## 2017-06-02 LAB
BASOPHILS # BLD AUTO: 0 K/UL — SIGNIFICANT CHANGE UP (ref 0–0.2)
BASOPHILS NFR BLD AUTO: 0 % — SIGNIFICANT CHANGE UP (ref 0–2)
BUN SERPL-MCNC: 8 MG/DL — SIGNIFICANT CHANGE UP (ref 7–23)
CALCIUM SERPL-MCNC: 9.6 MG/DL — SIGNIFICANT CHANGE UP (ref 8.4–10.5)
CHLORIDE SERPL-SCNC: 102 MMOL/L — SIGNIFICANT CHANGE UP (ref 98–107)
CO2 SERPL-SCNC: 28 MMOL/L — SIGNIFICANT CHANGE UP (ref 22–31)
CREAT SERPL-MCNC: 0.6 MG/DL — SIGNIFICANT CHANGE UP (ref 0.5–1.3)
EOSINOPHIL # BLD AUTO: 0 K/UL — SIGNIFICANT CHANGE UP (ref 0–0.5)
EOSINOPHIL NFR BLD AUTO: 0 % — SIGNIFICANT CHANGE UP (ref 0–6)
GLUCOSE SERPL-MCNC: 93 MG/DL — SIGNIFICANT CHANGE UP (ref 70–99)
HCT VFR BLD CALC: 37 % — SIGNIFICANT CHANGE UP (ref 34.5–45)
HGB BLD-MCNC: 11.6 G/DL — SIGNIFICANT CHANGE UP (ref 11.5–15.5)
IMM GRANULOCYTES NFR BLD AUTO: 0.3 % — SIGNIFICANT CHANGE UP (ref 0–1.5)
LYMPHOCYTES # BLD AUTO: 2.54 K/UL — SIGNIFICANT CHANGE UP (ref 1–3.3)
LYMPHOCYTES # BLD AUTO: 27.8 % — SIGNIFICANT CHANGE UP (ref 13–44)
MCHC RBC-ENTMCNC: 24.7 PG — LOW (ref 27–34)
MCHC RBC-ENTMCNC: 31.4 % — LOW (ref 32–36)
MCV RBC AUTO: 78.9 FL — LOW (ref 80–100)
MONOCYTES # BLD AUTO: 0.59 K/UL — SIGNIFICANT CHANGE UP (ref 0–0.9)
MONOCYTES NFR BLD AUTO: 6.4 % — SIGNIFICANT CHANGE UP (ref 2–14)
NEUTROPHILS # BLD AUTO: 5.99 K/UL — SIGNIFICANT CHANGE UP (ref 1.8–7.4)
NEUTROPHILS NFR BLD AUTO: 65.5 % — SIGNIFICANT CHANGE UP (ref 43–77)
PLATELET # BLD AUTO: 340 K/UL — SIGNIFICANT CHANGE UP (ref 150–400)
PMV BLD: 10.4 FL — SIGNIFICANT CHANGE UP (ref 7–13)
POTASSIUM SERPL-MCNC: 4 MMOL/L — SIGNIFICANT CHANGE UP (ref 3.5–5.3)
POTASSIUM SERPL-SCNC: 4 MMOL/L — SIGNIFICANT CHANGE UP (ref 3.5–5.3)
RBC # BLD: 4.69 M/UL — SIGNIFICANT CHANGE UP (ref 3.8–5.2)
RBC # FLD: 15.5 % — HIGH (ref 10.3–14.5)
SODIUM SERPL-SCNC: 144 MMOL/L — SIGNIFICANT CHANGE UP (ref 135–145)
WBC # BLD: 9.15 K/UL — SIGNIFICANT CHANGE UP (ref 3.8–10.5)
WBC # FLD AUTO: 9.15 K/UL — SIGNIFICANT CHANGE UP (ref 3.8–10.5)

## 2017-06-02 PROCEDURE — 71020: CPT | Mod: 26

## 2017-06-02 PROCEDURE — 99284 EMERGENCY DEPT VISIT MOD MDM: CPT

## 2017-06-02 RX ORDER — SODIUM CHLORIDE 9 MG/ML
1000 INJECTION INTRAMUSCULAR; INTRAVENOUS; SUBCUTANEOUS ONCE
Qty: 0 | Refills: 0 | Status: COMPLETED | OUTPATIENT
Start: 2017-06-02 | End: 2017-06-02

## 2017-06-02 RX ORDER — ONDANSETRON 8 MG/1
4 TABLET, FILM COATED ORAL ONCE
Qty: 0 | Refills: 0 | Status: COMPLETED | OUTPATIENT
Start: 2017-06-02 | End: 2017-06-02

## 2017-06-02 RX ADMIN — ONDANSETRON 4 MILLIGRAM(S): 8 TABLET, FILM COATED ORAL at 17:35

## 2017-06-02 RX ADMIN — SODIUM CHLORIDE 1000 MILLILITER(S): 9 INJECTION INTRAMUSCULAR; INTRAVENOUS; SUBCUTANEOUS at 18:21

## 2017-06-02 RX ADMIN — SODIUM CHLORIDE 1000 MILLILITER(S): 9 INJECTION INTRAMUSCULAR; INTRAVENOUS; SUBCUTANEOUS at 17:35

## 2017-06-02 NOTE — ED ADULT TRIAGE NOTE - CHIEF COMPLAINT QUOTE
states" I am having an URI since 3 days and now see some blood on coughing" .PMH  psych history, substance abuse . , hyper thyroidism, bipolar, depression, cholecystectomy

## 2017-06-02 NOTE — ED PROVIDER NOTE - PLAN OF CARE
Follow up with your PMD within 48-72 hours. Clinic number: [phone].  Find a doctor: [phone].   Rest.  No sports or strenuous activities. Drink plenty of fluids. Take tylenol 650mg every 6 hours for pain or temp greater than 100.   May add ibuprofen 600 mg every 8 hours if you still have fevers or pain. take with food.  Return to the Emergency Department immediately for any worsening or continued fever, chills, weakness, nausea, vomiting, diarrhea, abdominal pain, headaches, neck stiffness, chest pain or shortness of breath.

## 2017-06-02 NOTE — ED PROVIDER NOTE - PROGRESS NOTE DETAILS
Patient signed out to me.  22F with likely URI, vitals improved, CXR.  Discharge home with supportive care and return precautions.

## 2017-06-02 NOTE — ED PROVIDER NOTE - MEDICAL DECISION MAKING DETAILS
21 y/o female with uri, gastro sx, hemoptysis.  Suspect viral syndrome, likely bronchitis, pna a consideration.  Obtain cbc, bmp, cxr, give ivf bolus, antiemetic, nsaids, reassess. 21 y/o female with uri, gastro sx, hemoptysis.  Suspect viral syndrome, likely bronchitis, pna a consideration as is tb.  Obtain cbc, bmp, cxr, give ivf bolus, antiemetic, nsaids, reassess.

## 2017-06-02 NOTE — ED PROVIDER NOTE - OBJECTIVE STATEMENT
23 y/o female h/o bipolar d/o, hypothyroidism here with cough prod of yellow mucus, sore throat, runny nose, sinus pressure x3 days with one day of n/v nbnb x3, diarrhea no blood or mucus x5-7, hemoptysis brb after coughing fit today.  States temp "one hundred something" at home today.  Did not take any antipyretics.  Denies neck stiffness, cp, sob, abd pain, dysuria, rash.

## 2017-06-02 NOTE — ED PROVIDER NOTE - CARE PLAN
Principal Discharge DX:	Viral syndrome  Instructions for follow-up, activity and diet:	Follow up with your PMD within 48-72 hours. Clinic number: [phone].  Find a doctor: [phone].   Rest.  No sports or strenuous activities. Drink plenty of fluids. Take tylenol 650mg every 6 hours for pain or temp greater than 100.   May add ibuprofen 600 mg every 8 hours if you still have fevers or pain. take with food.  Return to the Emergency Department immediately for any worsening or continued fever, chills, weakness, nausea, vomiting, diarrhea, abdominal pain, headaches, neck stiffness, chest pain or shortness of breath. Principal Discharge DX:	Viral syndrome

## 2017-06-07 ENCOUNTER — EMERGENCY (EMERGENCY)
Facility: HOSPITAL | Age: 22
LOS: 1 days | Discharge: HOME | End: 2017-06-07
Attending: EMERGENCY MEDICINE | Admitting: EMERGENCY MEDICINE
Payer: MEDICAID

## 2017-06-07 VITALS
OXYGEN SATURATION: 98 % | RESPIRATION RATE: 22 BRPM | DIASTOLIC BLOOD PRESSURE: 28 MMHG | HEART RATE: 106 BPM | TEMPERATURE: 99 F | SYSTOLIC BLOOD PRESSURE: 94 MMHG

## 2017-06-07 VITALS — HEIGHT: 59 IN | WEIGHT: 244.93 LBS

## 2017-06-07 DIAGNOSIS — F43.21 ADJUSTMENT DISORDER WITH DEPRESSED MOOD: ICD-10-CM

## 2017-06-07 DIAGNOSIS — F12.21 CANNABIS DEPENDENCE, IN REMISSION: ICD-10-CM

## 2017-06-07 DIAGNOSIS — F43.10 POST-TRAUMATIC STRESS DISORDER, UNSPECIFIED: ICD-10-CM

## 2017-06-07 LAB
ALBUMIN SERPL ELPH-MCNC: 4.3 G/DL — SIGNIFICANT CHANGE UP (ref 3.3–5)
ALP SERPL-CCNC: 122 U/L — HIGH (ref 40–120)
ALT FLD-CCNC: 32 U/L — SIGNIFICANT CHANGE UP (ref 4–33)
AMPHET UR-MCNC: NEGATIVE — SIGNIFICANT CHANGE UP
APAP SERPL-MCNC: < 15 UG/ML — LOW (ref 15–25)
APPEARANCE UR: CLEAR — SIGNIFICANT CHANGE UP
AST SERPL-CCNC: 26 U/L — SIGNIFICANT CHANGE UP (ref 4–32)
BACTERIA # UR AUTO: SIGNIFICANT CHANGE UP
BARBITURATES MEASUREMENT: NEGATIVE — SIGNIFICANT CHANGE UP
BARBITURATES UR SCN-MCNC: NEGATIVE — SIGNIFICANT CHANGE UP
BASOPHILS # BLD AUTO: 0.01 K/UL — SIGNIFICANT CHANGE UP (ref 0–0.2)
BASOPHILS NFR BLD AUTO: 0.1 % — SIGNIFICANT CHANGE UP (ref 0–2)
BENZODIAZ SERPL-MCNC: NEGATIVE — SIGNIFICANT CHANGE UP
BENZODIAZ UR-MCNC: POSITIVE — SIGNIFICANT CHANGE UP
BILIRUB SERPL-MCNC: 0.2 MG/DL — SIGNIFICANT CHANGE UP (ref 0.2–1.2)
BILIRUB UR-MCNC: NEGATIVE — SIGNIFICANT CHANGE UP
BLOOD UR QL VISUAL: HIGH
BUN SERPL-MCNC: 12 MG/DL — SIGNIFICANT CHANGE UP (ref 7–23)
CALCIUM SERPL-MCNC: 9 MG/DL — SIGNIFICANT CHANGE UP (ref 8.4–10.5)
CANNABINOIDS UR-MCNC: NEGATIVE — SIGNIFICANT CHANGE UP
CHLORIDE SERPL-SCNC: 101 MMOL/L — SIGNIFICANT CHANGE UP (ref 98–107)
CO2 SERPL-SCNC: 22 MMOL/L — SIGNIFICANT CHANGE UP (ref 22–31)
COCAINE METAB.OTHER UR-MCNC: NEGATIVE — SIGNIFICANT CHANGE UP
COLOR SPEC: YELLOW — SIGNIFICANT CHANGE UP
CREAT SERPL-MCNC: 0.67 MG/DL — SIGNIFICANT CHANGE UP (ref 0.5–1.3)
EOSINOPHIL # BLD AUTO: 0 K/UL — SIGNIFICANT CHANGE UP (ref 0–0.5)
EOSINOPHIL NFR BLD AUTO: 0 % — SIGNIFICANT CHANGE UP (ref 0–6)
ETHANOL BLD-MCNC: < 10 MG/DL — SIGNIFICANT CHANGE UP
GLUCOSE SERPL-MCNC: 161 MG/DL — HIGH (ref 70–99)
GLUCOSE UR-MCNC: NEGATIVE — SIGNIFICANT CHANGE UP
HCG SERPL-ACNC: < 5 MIU/ML — SIGNIFICANT CHANGE UP
HCT VFR BLD CALC: 37.5 % — SIGNIFICANT CHANGE UP (ref 34.5–45)
HGB BLD-MCNC: 11.9 G/DL — SIGNIFICANT CHANGE UP (ref 11.5–15.5)
IMM GRANULOCYTES NFR BLD AUTO: 0.3 % — SIGNIFICANT CHANGE UP (ref 0–1.5)
KETONES UR-MCNC: SIGNIFICANT CHANGE UP
LEUKOCYTE ESTERASE UR-ACNC: NEGATIVE — SIGNIFICANT CHANGE UP
LYMPHOCYTES # BLD AUTO: 27.3 % — SIGNIFICANT CHANGE UP (ref 13–44)
LYMPHOCYTES # BLD AUTO: 3.84 K/UL — HIGH (ref 1–3.3)
MCHC RBC-ENTMCNC: 24.4 PG — LOW (ref 27–34)
MCHC RBC-ENTMCNC: 31.7 % — LOW (ref 32–36)
MCV RBC AUTO: 77 FL — LOW (ref 80–100)
METHADONE UR-MCNC: NEGATIVE — SIGNIFICANT CHANGE UP
MONOCYTES # BLD AUTO: 0.85 K/UL — SIGNIFICANT CHANGE UP (ref 0–0.9)
MONOCYTES NFR BLD AUTO: 6 % — SIGNIFICANT CHANGE UP (ref 2–14)
NEUTROPHILS # BLD AUTO: 9.31 K/UL — HIGH (ref 1.8–7.4)
NEUTROPHILS NFR BLD AUTO: 66.3 % — SIGNIFICANT CHANGE UP (ref 43–77)
NITRITE UR-MCNC: NEGATIVE — SIGNIFICANT CHANGE UP
OPIATES UR-MCNC: NEGATIVE — SIGNIFICANT CHANGE UP
OXYCODONE UR-MCNC: NEGATIVE — SIGNIFICANT CHANGE UP
PCP UR-MCNC: NEGATIVE — SIGNIFICANT CHANGE UP
PH UR: 6 — SIGNIFICANT CHANGE UP (ref 4.6–8)
PLATELET # BLD AUTO: 375 K/UL — SIGNIFICANT CHANGE UP (ref 150–400)
PMV BLD: 10.3 FL — SIGNIFICANT CHANGE UP (ref 7–13)
POTASSIUM SERPL-MCNC: 3.4 MMOL/L — LOW (ref 3.5–5.3)
POTASSIUM SERPL-SCNC: 3.4 MMOL/L — LOW (ref 3.5–5.3)
PROT SERPL-MCNC: 7.6 G/DL — SIGNIFICANT CHANGE UP (ref 6–8.3)
PROT UR-MCNC: 20 — SIGNIFICANT CHANGE UP
RBC # BLD: 4.87 M/UL — SIGNIFICANT CHANGE UP (ref 3.8–5.2)
RBC # FLD: 14.7 % — HIGH (ref 10.3–14.5)
RBC CASTS # UR COMP ASSIST: SIGNIFICANT CHANGE UP (ref 0–?)
SALICYLATES SERPL-MCNC: < 5 MG/DL — LOW (ref 15–30)
SODIUM SERPL-SCNC: 143 MMOL/L — SIGNIFICANT CHANGE UP (ref 135–145)
SP GR SPEC: 1.02 — SIGNIFICANT CHANGE UP (ref 1–1.03)
SQUAMOUS # UR AUTO: SIGNIFICANT CHANGE UP
T3 SERPL-MCNC: 124 NG/DL — SIGNIFICANT CHANGE UP (ref 80–200)
T4 FREE SERPL-MCNC: 0.8 NG/DL — LOW (ref 0.9–1.8)
TSH SERPL-MCNC: 83.85 UIU/ML — HIGH (ref 0.27–4.2)
UROBILINOGEN FLD QL: NORMAL E.U. — SIGNIFICANT CHANGE UP (ref 0.1–0.2)
WBC # BLD: 14.05 K/UL — HIGH (ref 3.8–10.5)
WBC # FLD AUTO: 14.05 K/UL — HIGH (ref 3.8–10.5)
WBC CLUMPS #/AREA URNS HPF: PRESENT — HIGH (ref 0–?)
WBC UR QL: SIGNIFICANT CHANGE UP (ref 0–?)

## 2017-06-07 PROCEDURE — 90792 PSYCH DIAG EVAL W/MED SRVCS: CPT | Mod: GC

## 2017-06-07 PROCEDURE — 99285 EMERGENCY DEPT VISIT HI MDM: CPT | Mod: 25

## 2017-06-07 PROCEDURE — 12005 RPR S/N/A/GEN/TRK12.6-20.0CM: CPT

## 2017-06-07 PROCEDURE — 93010 ELECTROCARDIOGRAM REPORT: CPT

## 2017-06-07 RX ORDER — SODIUM CHLORIDE 9 MG/ML
3 INJECTION INTRAMUSCULAR; INTRAVENOUS; SUBCUTANEOUS ONCE
Qty: 0 | Refills: 0 | Status: COMPLETED | OUTPATIENT
Start: 2017-06-07 | End: 2017-06-07

## 2017-06-07 RX ORDER — DIPHENHYDRAMINE HCL 50 MG
50 CAPSULE ORAL ONCE
Qty: 0 | Refills: 0 | Status: COMPLETED | OUTPATIENT
Start: 2017-06-07 | End: 2017-06-07

## 2017-06-07 RX ORDER — CHLORPROMAZINE HCL 10 MG
50 TABLET ORAL ONCE
Qty: 0 | Refills: 0 | Status: COMPLETED | OUTPATIENT
Start: 2017-06-07 | End: 2017-06-07

## 2017-06-07 RX ORDER — HALOPERIDOL DECANOATE 100 MG/ML
5 INJECTION INTRAMUSCULAR ONCE
Qty: 0 | Refills: 0 | Status: COMPLETED | OUTPATIENT
Start: 2017-06-07 | End: 2017-06-07

## 2017-06-07 RX ADMIN — Medication 50 MILLIGRAM(S): at 01:00

## 2017-06-07 RX ADMIN — Medication 2 MILLIGRAM(S): at 01:00

## 2017-06-07 RX ADMIN — HALOPERIDOL DECANOATE 5 MILLIGRAM(S): 100 INJECTION INTRAMUSCULAR at 01:00

## 2017-06-07 RX ADMIN — Medication 50 MILLIGRAM(S): at 01:15

## 2017-06-07 NOTE — ED BEHAVIORAL HEALTH ASSESSMENT NOTE - DESCRIPTION (FIRST USE, LAST USE, QUANTITY, FREQUENCY, DURATION)
Patient says she used to drink, but not frequently or in excess. Patient says that she stopped drinking 8 months ago. The nurse at the patient's facility says that the patient has a history of alcohol use disorder. Patient used to smoke 6 blunts of marijuana daily until 8 months ago.
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
## 2017-06-07 NOTE — ED BEHAVIORAL HEALTH ASSESSMENT NOTE - DIFFERENTIAL
Adjustment disorder with depressed mood and disturbance in conduct.  Borderline personality disorder.  Acute stress disorder.  Major depressive disorder.  Bipolar disorder. post-traumatic stress disorder   Borderline personality disorder.  Major depressive disorder.  Bipolar disorder.
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
## 2017-06-07 NOTE — ED PROVIDER NOTE - CARE PLAN
Principal Discharge DX:	Laceration of multiple sites of left upper extremity, initial encounter  Secondary Diagnosis:	Adjustment disorder with depressed mood
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
## 2017-06-07 NOTE — ED PROCEDURE NOTE - DETAILS:
Dr. Nina: I personally supervised this procedure performed by the PA and I agree with the above documentation.

## 2017-06-07 NOTE — ED ADULT NURSE REASSESSMENT NOTE - GENERAL PATIENT STATE
receievd pt awake, ambulating unassisted, calm with flat affect from medical ed s/p suture placement for self harm

## 2017-06-07 NOTE — ED ADULT TRIAGE NOTE - CHIEF COMPLAINT QUOTE
Pt arrives via [employer] escort in cuffs, Arrives screaming "I want to die, I just want to die...pt extremly agitated, thrashing on stretcher ,+ mult lacerations on left forearm." As PER EMT received versed 10mg enroute.

## 2017-06-07 NOTE — ED BEHAVIORAL HEALTH ASSESSMENT NOTE - OTHER PAST PSYCHIATRIC HISTORY (INCLUDE DETAILS REGARDING ONSET, COURSE OF ILLNESS, INPATIENT/OUTPATIENT TREATMENT)
Patient has a prior diagnosis of bipolar disorder, alcohol use disorder, cannabis use disorder, and rule out borderline personality disorder. She has a history of 10 prior inpatient psychiatric hospitalizations, most recently at [facility] in 11/2016 for suicidality.

## 2017-06-07 NOTE — PROVIDER CONTACT NOTE (OTHER) - ASSESSMENT
Pt stable to return to [facility].  [facility] transport called [phone].  auth#[id number].  p/u at 1145am via [employer] ride ambulette.
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
## 2017-06-07 NOTE — ED BEHAVIORAL HEALTH ASSESSMENT NOTE - CURRENT MEDICATION
Clozapine 100 mg PO BID, Depakote 500 mg PO QHS, isoniazid 300 mg PO daily, Synthroid 0.1mg PO daily, 0.075 mg PO daily, ferrous sulfate 325 mg PO QHS, pyridoxine 50 mg PO daily, ergocalciferol 5000 units PO once a week on Wednesday, Benadryl 50 mg PO QHS PRN insomnia, multivit 1 tab PO daily, Colace 100 mg PO BID, Pepcid 20 mg PO BID, Proventil HFA inhale 2 puffs every 4-6 hours PRN SOB/wheezing, Tylenol 650 mg PO TID daily PRN pain, bismuth subsalicylate 262 mg 2 tablespoons PO TID PRN, petrolatum ointment 1 packet TID daily on the skin PRN.

## 2017-06-07 NOTE — ED ADULT NURSE REASSESSMENT NOTE - COMFORT CARE
warm blanket provided/wait time explained
warm blanket provided/side rails up/repositioned/po fluids offered

## 2017-06-07 NOTE — ED BEHAVIORAL HEALTH ASSESSMENT NOTE - SUMMARY
22 year old female with prior diagnosis of bipolar disorder, alcohol use disorder, cannabis use disorder, rule out borderline personality disorder, 10 prior inpatient psychiatric hospitalizations, reported prior suicide attempts, presenting after patient cut her left forearm after discussing the trauma of having a dilatation and curettage. She developed depressed mood and self injurious behavior in reaction to marked distress out of proportion of the stress of talking about her prior trauma, consistent with an adjustment disorder with depressed mood and disturbance in conduct. She also has some symptoms of acute stress disorder, not meeting full criteria for the disorder. The patient denies that cutting herself was a suicide attempt, is future-oriented, and feels bad about having scared the people she lives with. 22 year old female with prior diagnosis of bipolar disorder, alcohol use disorder, cannabis use disorder, rule out borderline personality disorder, 10 prior inpatient psychiatric hospitalizations, reported prior suicide attempts, presenting after patient cut her left forearm after discussing the trauma of having a dilatation and curettage. She developed depressed mood and self injurious behavior in reaction to marked distress out of proportion of the stress of talking about her prior trauma, consistent with an adjustment disorder with depressed mood and disturbance in conduct. She also has some symptoms of acute stress disorder, not meeting full criteria for the disorder. Although the triage note says that the patient's cutting herself was a suicide attempt, the patient denies that cutting herself was a suicide attempt, is future-oriented, and feels bad about having scared the people she lives with. 22 year old female with prior diagnosis of bipolar disorder, alcohol use disorder, cannabis use disorder, rule out borderline personality disorder, 10 prior inpatient psychiatric hospitalizations, reported prior suicide attempts, presenting after patient cut her left forearm after discussing the trauma of having a dilatation and curettage. She developed depressed mood and self injurious behavior in reaction to marked distress out of proportion of the stress of talking about her prior trauma, consistent with some symptoms of post-traumatic stress disorder. Although the triage note says that the patient's cutting herself was a suicide attempt, the patient denies that cutting herself was a suicide attempt, reported she was trying to distract herself from her emotional pain, is future-oriented, and feels bad about having scared the people she lives with.
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
## 2017-06-07 NOTE — ED BEHAVIORAL HEALTH ASSESSMENT NOTE - SUICIDE PROTECTIVE FACTORS
Supportive social network or family/Engaged in work or school/Future oriented/Identifies reasons for living

## 2017-06-07 NOTE — ED PROVIDER NOTE - OBJECTIVE STATEMENT
21 y/o female hx bipolar PTSD hypothyroid presents to ED from [facility] for suicide attempt today. Pt. [place] by [place]/[employer] agitated and thrashing on grounds of [facility] with multiple lacerations to her left arm and shards of glass surroundg her on floor. Pt. was restrained by EMS and [employer] and brought into ED restrained very agitated and craying. given versed 10mg in the field. Pt. currently agitated and crying and unable to obtain any history from patient.

## 2017-06-07 NOTE — ED PROVIDER NOTE - PROGRESS NOTE DETAILS
TSH result: 83 - last TSH in [place] from 2009 = 8.1  Called lab and ordered on t3/t4. 18 sutures placed to left forearm - sutures need to be removed in 7 days either in ED or with PMD

## 2017-06-07 NOTE — ED BEHAVIORAL HEALTH ASSESSMENT NOTE - OTHER
Ms. Dooley ([phone]), nurse at [facility]. Taking classes for GED. Ms. Dooley ([phone]), nurse at [facility].

## 2017-06-07 NOTE — ED BEHAVIORAL HEALTH ASSESSMENT NOTE - SAFETY PLAN DETAILS
If patient develops thoughts of harming herself or others or killing herself or others, she should reach out to staff at her residence, call 911, or go to the ED.

## 2017-06-07 NOTE — ED PROVIDER NOTE - ATTENDING CONTRIBUTION TO CARE
Nina: 22 yof with bipolar disorder brought to ED by EMS and [facility] for suicidal attempt.  Pt still states she wants to die.  Pt found cut glass and was able to cut left forearm multiple times.  Denies any meds, or substances.  Pt given valium 10mg prior to arrival and upon arrival pt is very combative, aggressive, thrashing.  EMS noted pt was trashing and trying to hit head against stretcher and ground.  On exam, pt is tachy and htn, crying, clear lungs, normal cardiac, obese, 5 lacerations of varying sizes 1cm to 2.5cm with multiple other superficial lacerations that do not open.  abd soft, no other trauma noted, head with some redness no area of tn.  Tetanus, labs, then medically clear after wound suturing and care.

## 2017-06-08 LAB
BACTERIA UR CULT: SIGNIFICANT CHANGE UP
SPECIMEN SOURCE: SIGNIFICANT CHANGE UP

## 2017-06-11 ENCOUNTER — EMERGENCY (EMERGENCY)
Facility: HOSPITAL | Age: 22
LOS: 1 days | Discharge: HOME | End: 2017-06-11
Attending: EMERGENCY MEDICINE | Admitting: EMERGENCY MEDICINE
Payer: MEDICAID

## 2017-06-11 VITALS
OXYGEN SATURATION: 100 % | RESPIRATION RATE: 17 BRPM | HEART RATE: 91 BPM | SYSTOLIC BLOOD PRESSURE: 134 MMHG | TEMPERATURE: 98 F | DIASTOLIC BLOOD PRESSURE: 67 MMHG

## 2017-06-11 VITALS
OXYGEN SATURATION: 100 % | TEMPERATURE: 98 F | SYSTOLIC BLOOD PRESSURE: 140 MMHG | RESPIRATION RATE: 18 BRPM | HEART RATE: 104 BPM | DIASTOLIC BLOOD PRESSURE: 85 MMHG

## 2017-06-11 LAB
ALBUMIN SERPL ELPH-MCNC: 4.1 G/DL — SIGNIFICANT CHANGE UP (ref 3.3–5)
ALP SERPL-CCNC: 124 U/L — HIGH (ref 40–120)
ALT FLD-CCNC: 29 U/L — SIGNIFICANT CHANGE UP (ref 4–33)
APPEARANCE UR: SIGNIFICANT CHANGE UP
AST SERPL-CCNC: 26 U/L — SIGNIFICANT CHANGE UP (ref 4–32)
BACTERIA # UR AUTO: SIGNIFICANT CHANGE UP
BILIRUB SERPL-MCNC: 0.2 MG/DL — SIGNIFICANT CHANGE UP (ref 0.2–1.2)
BILIRUB UR-MCNC: NEGATIVE — SIGNIFICANT CHANGE UP
BLOOD UR QL VISUAL: HIGH
BUN SERPL-MCNC: 5 MG/DL — LOW (ref 7–23)
CALCIUM SERPL-MCNC: 9.2 MG/DL — SIGNIFICANT CHANGE UP (ref 8.4–10.5)
CHLORIDE SERPL-SCNC: 104 MMOL/L — SIGNIFICANT CHANGE UP (ref 98–107)
CO2 SERPL-SCNC: 24 MMOL/L — SIGNIFICANT CHANGE UP (ref 22–31)
COD CRY URNS QL: SIGNIFICANT CHANGE UP (ref 0–0)
COLOR SPEC: YELLOW — SIGNIFICANT CHANGE UP
CREAT SERPL-MCNC: 0.64 MG/DL — SIGNIFICANT CHANGE UP (ref 0.5–1.3)
GLUCOSE SERPL-MCNC: 94 MG/DL — SIGNIFICANT CHANGE UP (ref 70–99)
GLUCOSE UR-MCNC: NEGATIVE — SIGNIFICANT CHANGE UP
HCT VFR BLD CALC: 37.3 % — SIGNIFICANT CHANGE UP (ref 34.5–45)
HGB BLD-MCNC: 11.5 G/DL — SIGNIFICANT CHANGE UP (ref 11.5–15.5)
KETONES UR-MCNC: NEGATIVE — SIGNIFICANT CHANGE UP
LEUKOCYTE ESTERASE UR-ACNC: NEGATIVE — SIGNIFICANT CHANGE UP
MCHC RBC-ENTMCNC: 24 PG — LOW (ref 27–34)
MCHC RBC-ENTMCNC: 30.8 % — LOW (ref 32–36)
MCV RBC AUTO: 77.9 FL — LOW (ref 80–100)
MUCOUS THREADS # UR AUTO: SIGNIFICANT CHANGE UP
NITRITE UR-MCNC: NEGATIVE — SIGNIFICANT CHANGE UP
PH UR: 7.5 — SIGNIFICANT CHANGE UP (ref 4.6–8)
PLATELET # BLD AUTO: 324 K/UL — SIGNIFICANT CHANGE UP (ref 150–400)
PMV BLD: 9.7 FL — SIGNIFICANT CHANGE UP (ref 7–13)
POTASSIUM SERPL-MCNC: 4 MMOL/L — SIGNIFICANT CHANGE UP (ref 3.5–5.3)
POTASSIUM SERPL-SCNC: 4 MMOL/L — SIGNIFICANT CHANGE UP (ref 3.5–5.3)
PROT SERPL-MCNC: 7.3 G/DL — SIGNIFICANT CHANGE UP (ref 6–8.3)
PROT UR-MCNC: 10 — SIGNIFICANT CHANGE UP
RBC # BLD: 4.79 M/UL — SIGNIFICANT CHANGE UP (ref 3.8–5.2)
RBC # FLD: 15.2 % — HIGH (ref 10.3–14.5)
RBC CASTS # UR COMP ASSIST: SIGNIFICANT CHANGE UP (ref 0–?)
SODIUM SERPL-SCNC: 145 MMOL/L — SIGNIFICANT CHANGE UP (ref 135–145)
SP GR SPEC: 1.02 — SIGNIFICANT CHANGE UP (ref 1–1.03)
SQUAMOUS # UR AUTO: SIGNIFICANT CHANGE UP
UROBILINOGEN FLD QL: NORMAL E.U. — SIGNIFICANT CHANGE UP (ref 0.1–0.2)
WBC # BLD: 7.67 K/UL — SIGNIFICANT CHANGE UP (ref 3.8–10.5)
WBC # FLD AUTO: 7.67 K/UL — SIGNIFICANT CHANGE UP (ref 3.8–10.5)
WBC UR QL: SIGNIFICANT CHANGE UP (ref 0–?)

## 2017-06-11 PROCEDURE — 99284 EMERGENCY DEPT VISIT MOD MDM: CPT

## 2017-06-11 PROCEDURE — 74177 CT ABD & PELVIS W/CONTRAST: CPT | Mod: 26

## 2017-06-11 NOTE — ED PROVIDER NOTE - ABDOMINAL EXAM
tender.../soft/no pulsating masses/POSITIVE FOR GUARDING/no organomegaly/[facility] TEND[facility]NESS

## 2017-06-11 NOTE — ED PROVIDER NOTE - PHYSICAL EXAMINATION
Attending/phillip: Obese, NAD; PERRL/EOMI, non-icterus, supple, no ROCKY, no JVD, RRR, CTAB; Abd-soft, +RLQ PT w/ vol guarding, no HSM; no LE edema, A&Ox3, nonfocal; Skin-warm/dry; no CVAT Attending/Aletha: Obese, NAD; PERRL/EOMI, non-icterus, supple, no ROCKY, no JVD, RRR, CTAB; Abd-soft, +RLQ PT w/ vol guarding, no HSM; no LE edema, A&Ox3, nonfocal; Skin-warm/dry; no CVAT

## 2017-06-11 NOTE — ED PROVIDER NOTE - OBJECTIVE STATEMENT
Attending/Aletha: 23 yo F LMP 5/7/17 p/w ~ 2 days of Rt sided abd pain, initially intermittent now constraint, worse w/ movement, associated w/ non-bloody vomitus and subjective fever/chills. Denies vag d/c, CP, SOB, or palp. Pt reports several weeks of diarrhea following diary intake.

## 2017-06-11 NOTE — ED ADULT NURSE NOTE - OBJECTIVE STATEMENT
Pt received to intake area. Pt complaining of right sided abdominal pain that was intermittent but now constant. Pt states she has been having this pain for the past two day, today is worse. Pt also complaining of N/V. Pt appears in no acute distress. IV access obtained, labs drawn and sent.

## 2017-06-11 NOTE — ED PROVIDER NOTE - PROGRESS NOTE DETAILS
Acerra:  Pt reassessed.  States symptoms resolved and feeling better.  Tolerating oral intake.  No nausea or vomiting.

## 2017-06-14 ENCOUNTER — EMERGENCY (EMERGENCY)
Facility: HOSPITAL | Age: 22
LOS: 1 days | Discharge: HOME | End: 2017-06-14
Attending: EMERGENCY MEDICINE | Admitting: EMERGENCY MEDICINE

## 2017-06-14 VITALS
DIASTOLIC BLOOD PRESSURE: 47 MMHG | TEMPERATURE: 98 F | HEART RATE: 105 BPM | OXYGEN SATURATION: 100 % | SYSTOLIC BLOOD PRESSURE: 123 MMHG | RESPIRATION RATE: 18 BRPM

## 2017-06-14 NOTE — ED PROVIDER NOTE - PHYSICAL EXAMINATION
Left forearm with 18 simple interrupted sutures placed on 5 separate linear lacerations.  All appear to be healing well.  No evidence of infection.  No erythema, warmth, pus.  No dehiscence.

## 2017-06-14 NOTE — ED ADULT TRIAGE NOTE - CHIEF COMPLAINT QUOTE
pt reports she had 18 stitches placed to left forearm, and was told to return in 7 days for suture removal. denies any complications with suture site.

## 2017-06-14 NOTE — ED PROVIDER NOTE - NS ED MD SCRIBE ATTENDING SCRIBE SECTIONS
DISPOSITION/REVIEW OF SYSTEMS/HISTORY OF PRESENT ILLNESS/HIV/PAST MEDICAL/SURGICAL/SOCIAL HISTORY/VITAL SIGNS( [place])

## 2017-06-14 NOTE — ED PROVIDER NOTE - OBJECTIVE STATEMENT
22y F with PMHx of hypothyroidism presents to the ED for suture removal s/p suture placement 7 days ago. Pt sustained lac from broken glass at left forearm and had 18 sutures placed. Tetanus UTD. Pt has been applying cream. Not taking antibiotics. Reports she was here 3 days ago for abdominal pain but is feeling better. Denies pain or any other complaints. Medications: Benadryl and Synthroid. 22y F with PMHx of hypothyroidism presents to the ED for suture removal s/p suture placement 7 days ago. Pt sustained lac from self inflicted cuts with broken glass to left forearm and had 18 sutures placed. Tetanus UTD at that time so not administered.  [place] records reviewed.  Patient denies any further SI or intent to harm herself. Pt has been applying cream. Not taking antibiotics. Reports she was here 3 days ago for abdominal pain but is feeling better.  [place] results reviewed for that visit as well. Denies pain or any other complaints. Medications: Benadryl and Synthroid.

## 2017-07-13 ENCOUNTER — EMERGENCY (EMERGENCY)
Facility: HOSPITAL | Age: 22
LOS: 1 days | Discharge: HOME | End: 2017-07-13
Attending: EMERGENCY MEDICINE | Admitting: EMERGENCY MEDICINE
Payer: MEDICAID

## 2017-07-13 VITALS
OXYGEN SATURATION: 99 % | RESPIRATION RATE: 18 BRPM | DIASTOLIC BLOOD PRESSURE: 66 MMHG | SYSTOLIC BLOOD PRESSURE: 104 MMHG | HEART RATE: 93 BPM | TEMPERATURE: 98 F

## 2017-07-13 VITALS
RESPIRATION RATE: 18 BRPM | OXYGEN SATURATION: 99 % | TEMPERATURE: 99 F | HEART RATE: 117 BPM | DIASTOLIC BLOOD PRESSURE: 85 MMHG | SYSTOLIC BLOOD PRESSURE: 121 MMHG

## 2017-07-13 LAB
ALBUMIN SERPL ELPH-MCNC: 4.1 G/DL — SIGNIFICANT CHANGE UP (ref 3.3–5)
ALP SERPL-CCNC: 119 U/L — SIGNIFICANT CHANGE UP (ref 40–120)
ALT FLD-CCNC: 34 U/L — HIGH (ref 4–33)
APPEARANCE UR: SIGNIFICANT CHANGE UP
AST SERPL-CCNC: 27 U/L — SIGNIFICANT CHANGE UP (ref 4–32)
BACTERIA # UR AUTO: SIGNIFICANT CHANGE UP
BASOPHILS # BLD AUTO: 0.01 K/UL — SIGNIFICANT CHANGE UP (ref 0–0.2)
BASOPHILS NFR BLD AUTO: 0.2 % — SIGNIFICANT CHANGE UP (ref 0–2)
BILIRUB SERPL-MCNC: 0.2 MG/DL — SIGNIFICANT CHANGE UP (ref 0.2–1.2)
BILIRUB UR-MCNC: NEGATIVE — SIGNIFICANT CHANGE UP
BLOOD UR QL VISUAL: HIGH
BUN SERPL-MCNC: 9 MG/DL — SIGNIFICANT CHANGE UP (ref 7–23)
CALCIUM SERPL-MCNC: 9 MG/DL — SIGNIFICANT CHANGE UP (ref 8.4–10.5)
CHLORIDE SERPL-SCNC: 102 MMOL/L — SIGNIFICANT CHANGE UP (ref 98–107)
CO2 SERPL-SCNC: 27 MMOL/L — SIGNIFICANT CHANGE UP (ref 22–31)
COLOR SPEC: YELLOW — SIGNIFICANT CHANGE UP
CREAT SERPL-MCNC: 0.61 MG/DL — SIGNIFICANT CHANGE UP (ref 0.5–1.3)
EOSINOPHIL # BLD AUTO: 0 K/UL — SIGNIFICANT CHANGE UP (ref 0–0.5)
EOSINOPHIL NFR BLD AUTO: 0 % — SIGNIFICANT CHANGE UP (ref 0–6)
GLUCOSE SERPL-MCNC: 123 MG/DL — HIGH (ref 70–99)
GLUCOSE UR-MCNC: NEGATIVE — SIGNIFICANT CHANGE UP
HCT VFR BLD CALC: 37.2 % — SIGNIFICANT CHANGE UP (ref 34.5–45)
HGB BLD-MCNC: 11.7 G/DL — SIGNIFICANT CHANGE UP (ref 11.5–15.5)
IMM GRANULOCYTES # BLD AUTO: 0.02 # — SIGNIFICANT CHANGE UP
IMM GRANULOCYTES NFR BLD AUTO: 0.3 % — SIGNIFICANT CHANGE UP (ref 0–1.5)
KETONES UR-MCNC: SIGNIFICANT CHANGE UP
LEUKOCYTE ESTERASE UR-ACNC: HIGH
LIDOCAIN IGE QN: 24.2 U/L — SIGNIFICANT CHANGE UP (ref 7–60)
LYMPHOCYTES # BLD AUTO: 1.97 K/UL — SIGNIFICANT CHANGE UP (ref 1–3.3)
LYMPHOCYTES # BLD AUTO: 34.2 % — SIGNIFICANT CHANGE UP (ref 13–44)
MCHC RBC-ENTMCNC: 24.2 PG — LOW (ref 27–34)
MCHC RBC-ENTMCNC: 31.5 % — LOW (ref 32–36)
MCV RBC AUTO: 76.9 FL — LOW (ref 80–100)
MONOCYTES # BLD AUTO: 0.48 K/UL — SIGNIFICANT CHANGE UP (ref 0–0.9)
MONOCYTES NFR BLD AUTO: 8.3 % — SIGNIFICANT CHANGE UP (ref 2–14)
MUCOUS THREADS # UR AUTO: SIGNIFICANT CHANGE UP
NEUTROPHILS # BLD AUTO: 3.28 K/UL — SIGNIFICANT CHANGE UP (ref 1.8–7.4)
NEUTROPHILS NFR BLD AUTO: 57 % — SIGNIFICANT CHANGE UP (ref 43–77)
NITRITE UR-MCNC: NEGATIVE — SIGNIFICANT CHANGE UP
NON-SQ EPI CELLS # UR AUTO: <1 — SIGNIFICANT CHANGE UP
NRBC # FLD: 0 — SIGNIFICANT CHANGE UP
PH UR: 6 — SIGNIFICANT CHANGE UP (ref 4.6–8)
PLATELET # BLD AUTO: 272 K/UL — SIGNIFICANT CHANGE UP (ref 150–400)
PMV BLD: 11.3 FL — SIGNIFICANT CHANGE UP (ref 7–13)
POTASSIUM SERPL-MCNC: 3.8 MMOL/L — SIGNIFICANT CHANGE UP (ref 3.5–5.3)
POTASSIUM SERPL-SCNC: 3.8 MMOL/L — SIGNIFICANT CHANGE UP (ref 3.5–5.3)
PROT SERPL-MCNC: 7.5 G/DL — SIGNIFICANT CHANGE UP (ref 6–8.3)
PROT UR-MCNC: 30 — HIGH
RBC # BLD: 4.84 M/UL — SIGNIFICANT CHANGE UP (ref 3.8–5.2)
RBC # FLD: 14.8 % — HIGH (ref 10.3–14.5)
RBC CASTS # UR COMP ASSIST: SIGNIFICANT CHANGE UP (ref 0–?)
SODIUM SERPL-SCNC: 141 MMOL/L — SIGNIFICANT CHANGE UP (ref 135–145)
SP GR SPEC: 1.02 — SIGNIFICANT CHANGE UP (ref 1–1.03)
SQUAMOUS # UR AUTO: SIGNIFICANT CHANGE UP
UROBILINOGEN FLD QL: NORMAL E.U. — SIGNIFICANT CHANGE UP (ref 0.1–0.2)
WBC # BLD: 5.76 K/UL — SIGNIFICANT CHANGE UP (ref 3.8–10.5)
WBC # FLD AUTO: 5.76 K/UL — SIGNIFICANT CHANGE UP (ref 3.8–10.5)
WBC UR QL: HIGH (ref 0–?)

## 2017-07-13 PROCEDURE — 99285 EMERGENCY DEPT VISIT HI MDM: CPT

## 2017-07-13 PROCEDURE — 74177 CT ABD & PELVIS W/CONTRAST: CPT | Mod: 26

## 2017-07-13 RX ORDER — SODIUM CHLORIDE 9 MG/ML
1000 INJECTION INTRAMUSCULAR; INTRAVENOUS; SUBCUTANEOUS ONCE
Qty: 0 | Refills: 0 | Status: COMPLETED | OUTPATIENT
Start: 2017-07-13 | End: 2017-07-13

## 2017-07-13 RX ORDER — ONDANSETRON 8 MG/1
4 TABLET, FILM COATED ORAL ONCE
Qty: 0 | Refills: 0 | Status: COMPLETED | OUTPATIENT
Start: 2017-07-13 | End: 2017-07-13

## 2017-07-13 RX ORDER — ACETAMINOPHEN 500 MG
1000 TABLET ORAL ONCE
Qty: 0 | Refills: 0 | Status: COMPLETED | OUTPATIENT
Start: 2017-07-13 | End: 2017-07-13

## 2017-07-13 RX ORDER — MORPHINE SULFATE 50 MG/1
4 CAPSULE, EXTENDED RELEASE ORAL ONCE
Qty: 0 | Refills: 0 | Status: DISCONTINUED | OUTPATIENT
Start: 2017-07-13 | End: 2017-07-13

## 2017-07-13 RX ADMIN — Medication 400 MILLIGRAM(S): at 19:23

## 2017-07-13 RX ADMIN — MORPHINE SULFATE 4 MILLIGRAM(S): 50 CAPSULE, EXTENDED RELEASE ORAL at 18:39

## 2017-07-13 RX ADMIN — SODIUM CHLORIDE 1000 MILLILITER(S): 9 INJECTION INTRAMUSCULAR; INTRAVENOUS; SUBCUTANEOUS at 18:40

## 2017-07-13 RX ADMIN — ONDANSETRON 4 MILLIGRAM(S): 8 TABLET, FILM COATED ORAL at 18:40

## 2017-07-13 NOTE — ED PROVIDER NOTE - OBJECTIVE STATEMENT
21 y/o F PMH asthma, bipolar d/o, hypothyroidism, hypoparathyroidism, pre-dm, obesity c/o constant RLQ pain and nausea since this afternoon. Pt notes feeling sweaty and fatigued today, went to a buffet that she also frequented yesterday and was unable to eat d/t pain in RLQ. Also notes emesis last night. Endorses diarrhea 1 week ago that resolved. States she has not had a BM since last night, unsure if passed gas today. Denies fever, chills, CP, SOB, cough, vomiting today, hematemesis, dysuria, hematuria, frequency, back pain, abnormal vaginal discharge, dyspareunia. 23 y/o F PMH asthma, bipolar d/o, hypothyroidism, hypoparathyroidism, pre-dm, obesity c/o constant RLQ pain and nausea since this afternoon. Pt notes feeling sweaty and fatigued today, went to a buffet that she also frequented yesterday and was unable to eat d/t pain in RLQ. Also notes emesis last night. Endorses diarrhea 1 week ago that resolved. States she has not had a BM since last night, unsure if passed gas today. Denies fever, chills, CP, SOB, cough, vomiting today, hematemesis, dysuria, hematuria, frequency, back pain, abnormal vaginal discharge, dyspareunia.     18:30 Main att: 22F h/o hypothyroid, asthma, s/p maci, schizophrenia, bipolar d/o, depression c/o abd pain x 24 hours. Prior week patient had several loose non bloody stools, neg travel abx or sick contact. Yesterday patient and boyfriend ate at a buffet. Later that evening patient noted severe abd discomfort, points to rlq, constant, nonrad, nonbloody nonbilious emesis x 1 overnight. Today persistent nausea and abd pain. Denies fever, loose stools, dysuria, vag bleed.

## 2017-07-13 NOTE — ED PROVIDER NOTE - ATTENDING CONTRIBUTION TO CARE
Dr. Main: I performed a face to face bedside interview with patient regarding history of present illness, review of symptoms and past medical history. I completed an independent physical exam.  I have discussed patient's plan of care with PA.   I agree with note as stated above, having amended the EMR as needed to reflect my findings.   This includes HISTORY OF PRESENT ILLNESS, HIV, PAST MEDICAL/SURGICAL/FAMILY/SOCIAL HISTORY, ALLERGIES AND HOME MEDICATIONS, REVIEW OF SYSTEMS, PHYSICAL EXAM, and any PROGRESS NOTES during the time I functioned as the attending physician for this patient. HPI above as by me. PE above as by me. Diffuse epigas and periumbilical tenderness DDX appy, pregnancy, food poisoning PLAN cbc diff, cmp, lipase, hcg, if hcg neg ct po and iv, zofran, morphine.

## 2017-07-13 NOTE — ED PROVIDER NOTE - CARE PLAN
Instructions for follow-up, activity and diet:	Follow up with your PMD within 48-72 hours.  Rest, increase fluids. Take tylenol 650mg every 6 hours for pain or temp greater than 99.9. Take Pepcid 20mg 1 tab 2x/day for 1-2 days as needed for epigastric burning.  Eat bland, small meals as tolerated.  Worsening or continued fever, chills, weakness, nausea, vomiting, abdominal pain return to ER Principal Discharge DX:	Abdominal pain  Instructions for follow-up, activity and diet:	Follow up with your PMD within 48-72 hours.  Rest, increase fluids. Take tylenol 650mg every 6 hours for pain or temp greater than 99.9. Take Pepcid 20mg 1 tab 2x/day for 1-2 days as needed for epigastric burning.  Eat bland, small meals as tolerated.  Worsening or continued fever, chills, weakness, nausea, vomiting, abdominal pain return to ER

## 2017-07-13 NOTE — ED PROVIDER NOTE - PLAN OF CARE
Follow up with your PMD within 48-72 hours.  Rest, increase fluids. Take tylenol 650mg every 6 hours for pain or temp greater than 99.9. Take Pepcid 20mg 1 tab 2x/day for 1-2 days as needed for epigastric burning.  Eat bland, small meals as tolerated.  Worsening or continued fever, chills, weakness, nausea, vomiting, abdominal pain return to ER

## 2017-07-13 NOTE — ED PROVIDER NOTE - MEDICAL DECISION MAKING DETAILS
Diffuse epigas and periumbilical tenderness DDX appy, pregnancy, food poisoning PLAN cbc diff, cmp, lipase, hcg, if hcg neg ct po and iv, zofran, morphine

## 2017-07-15 LAB
BACTERIA UR CULT: SIGNIFICANT CHANGE UP
SPECIMEN SOURCE: SIGNIFICANT CHANGE UP

## 2017-07-21 ENCOUNTER — EMERGENCY (EMERGENCY)
Facility: HOSPITAL | Age: 22
LOS: 1 days | Discharge: HOME | End: 2017-07-21
Attending: EMERGENCY MEDICINE | Admitting: EMERGENCY MEDICINE
Payer: MEDICAID

## 2017-07-21 VITALS
OXYGEN SATURATION: 99 % | SYSTOLIC BLOOD PRESSURE: 119 MMHG | RESPIRATION RATE: 20 BRPM | TEMPERATURE: 97 F | HEART RATE: 100 BPM | DIASTOLIC BLOOD PRESSURE: 88 MMHG

## 2017-07-21 VITALS
HEART RATE: 88 BPM | RESPIRATION RATE: 14 BRPM | SYSTOLIC BLOOD PRESSURE: 102 MMHG | OXYGEN SATURATION: 99 % | DIASTOLIC BLOOD PRESSURE: 50 MMHG

## 2017-07-21 LAB
ALBUMIN SERPL ELPH-MCNC: 4.2 G/DL — SIGNIFICANT CHANGE UP (ref 3.3–5)
ALP SERPL-CCNC: 119 U/L — SIGNIFICANT CHANGE UP (ref 40–120)
ALT FLD-CCNC: 23 U/L — SIGNIFICANT CHANGE UP (ref 4–33)
AMYLASE P1 CFR SERPL: 74 U/L — SIGNIFICANT CHANGE UP (ref 25–125)
APPEARANCE UR: SIGNIFICANT CHANGE UP
AST SERPL-CCNC: 19 U/L — SIGNIFICANT CHANGE UP (ref 4–32)
BACTERIA # UR AUTO: SIGNIFICANT CHANGE UP
BASE EXCESS BLDV CALC-SCNC: -6.2 MMOL/L — SIGNIFICANT CHANGE UP
BASE EXCESS BLDV CALC-SCNC: 1.2 MMOL/L — SIGNIFICANT CHANGE UP
BASOPHILS # BLD AUTO: 0.01 K/UL — SIGNIFICANT CHANGE UP (ref 0–0.2)
BASOPHILS NFR BLD AUTO: 0.1 % — SIGNIFICANT CHANGE UP (ref 0–2)
BILIRUB SERPL-MCNC: 0.2 MG/DL — SIGNIFICANT CHANGE UP (ref 0.2–1.2)
BILIRUB UR-MCNC: NEGATIVE — SIGNIFICANT CHANGE UP
BLOOD GAS VENOUS - CREATININE: 0.36 MG/DL — LOW (ref 0.5–1.3)
BLOOD GAS VENOUS - CREATININE: 0.51 MG/DL — SIGNIFICANT CHANGE UP (ref 0.5–1.3)
BLOOD UR QL VISUAL: NEGATIVE — SIGNIFICANT CHANGE UP
BUN SERPL-MCNC: 9 MG/DL — SIGNIFICANT CHANGE UP (ref 7–23)
CALCIUM SERPL-MCNC: 9.2 MG/DL — SIGNIFICANT CHANGE UP (ref 8.4–10.5)
CHLORIDE BLDV-SCNC: 103 MMOL/L — SIGNIFICANT CHANGE UP (ref 96–108)
CHLORIDE BLDV-SCNC: 115 MMOL/L — HIGH (ref 96–108)
CHLORIDE SERPL-SCNC: 100 MMOL/L — SIGNIFICANT CHANGE UP (ref 98–107)
CO2 SERPL-SCNC: 25 MMOL/L — SIGNIFICANT CHANGE UP (ref 22–31)
COLOR SPEC: YELLOW — SIGNIFICANT CHANGE UP
CREAT SERPL-MCNC: 0.66 MG/DL — SIGNIFICANT CHANGE UP (ref 0.5–1.3)
EOSINOPHIL # BLD AUTO: 0 K/UL — SIGNIFICANT CHANGE UP (ref 0–0.5)
EOSINOPHIL NFR BLD AUTO: 0 % — SIGNIFICANT CHANGE UP (ref 0–6)
GAS PNL BLDV: 139 MMOL/L — SIGNIFICANT CHANGE UP (ref 136–146)
GAS PNL BLDV: 140 MMOL/L — SIGNIFICANT CHANGE UP (ref 136–146)
GLUCOSE BLDV-MCNC: 120 — HIGH (ref 70–99)
GLUCOSE BLDV-MCNC: 72 — SIGNIFICANT CHANGE UP (ref 70–99)
GLUCOSE SERPL-MCNC: 115 MG/DL — HIGH (ref 70–99)
GLUCOSE UR-MCNC: NEGATIVE — SIGNIFICANT CHANGE UP
HCG SERPL-ACNC: < 5 MIU/ML — SIGNIFICANT CHANGE UP
HCO3 BLDV-SCNC: 19 MMOL/L — LOW (ref 20–27)
HCO3 BLDV-SCNC: 23 MMOL/L — SIGNIFICANT CHANGE UP (ref 20–27)
HCT VFR BLD CALC: 38.9 % — SIGNIFICANT CHANGE UP (ref 34.5–45)
HCT VFR BLDV CALC: 30.1 % — LOW (ref 34.5–45)
HCT VFR BLDV CALC: 37.2 % — SIGNIFICANT CHANGE UP (ref 34.5–45)
HGB BLD-MCNC: 11.8 G/DL — SIGNIFICANT CHANGE UP (ref 11.5–15.5)
HGB BLDV-MCNC: 12.1 G/DL — SIGNIFICANT CHANGE UP (ref 11.5–15.5)
HGB BLDV-MCNC: 9.7 G/DL — LOW (ref 11.5–15.5)
IMM GRANULOCYTES # BLD AUTO: 0.03 # — SIGNIFICANT CHANGE UP
IMM GRANULOCYTES NFR BLD AUTO: 0.3 % — SIGNIFICANT CHANGE UP (ref 0–1.5)
KETONES UR-MCNC: NEGATIVE — SIGNIFICANT CHANGE UP
LACTATE BLDV-MCNC: 1.4 MMOL/L — SIGNIFICANT CHANGE UP (ref 0.5–2)
LACTATE BLDV-MCNC: 3 MMOL/L — HIGH (ref 0.5–2)
LEUKOCYTE ESTERASE UR-ACNC: HIGH
LIDOCAIN IGE QN: 19.9 U/L — SIGNIFICANT CHANGE UP (ref 7–60)
LYMPHOCYTES # BLD AUTO: 1.51 K/UL — SIGNIFICANT CHANGE UP (ref 1–3.3)
LYMPHOCYTES # BLD AUTO: 16.9 % — SIGNIFICANT CHANGE UP (ref 13–44)
MCHC RBC-ENTMCNC: 24.2 PG — LOW (ref 27–34)
MCHC RBC-ENTMCNC: 30.3 % — LOW (ref 32–36)
MCV RBC AUTO: 79.9 FL — LOW (ref 80–100)
MONOCYTES # BLD AUTO: 0.63 K/UL — SIGNIFICANT CHANGE UP (ref 0–0.9)
MONOCYTES NFR BLD AUTO: 7.1 % — SIGNIFICANT CHANGE UP (ref 2–14)
MUCOUS THREADS # UR AUTO: SIGNIFICANT CHANGE UP
NEUTROPHILS # BLD AUTO: 6.73 K/UL — SIGNIFICANT CHANGE UP (ref 1.8–7.4)
NEUTROPHILS NFR BLD AUTO: 75.6 % — SIGNIFICANT CHANGE UP (ref 43–77)
NITRITE UR-MCNC: NEGATIVE — SIGNIFICANT CHANGE UP
NRBC # FLD: 0 — SIGNIFICANT CHANGE UP
PCO2 BLDV: 38 MMHG — LOW (ref 41–51)
PCO2 BLDV: 64 MMHG — HIGH (ref 41–51)
PH BLDV: 7.26 PH — LOW (ref 7.32–7.43)
PH BLDV: 7.32 PH — SIGNIFICANT CHANGE UP (ref 7.32–7.43)
PH UR: 6 — SIGNIFICANT CHANGE UP (ref 4.6–8)
PLATELET # BLD AUTO: 287 K/UL — SIGNIFICANT CHANGE UP (ref 150–400)
PMV BLD: 10.6 FL — SIGNIFICANT CHANGE UP (ref 7–13)
PO2 BLDV: 30 MMHG — LOW (ref 35–40)
PO2 BLDV: 68 MMHG — HIGH (ref 35–40)
POTASSIUM BLDV-SCNC: 3.7 MMOL/L — SIGNIFICANT CHANGE UP (ref 3.4–4.5)
POTASSIUM BLDV-SCNC: 3.7 MMOL/L — SIGNIFICANT CHANGE UP (ref 3.4–4.5)
POTASSIUM SERPL-MCNC: 3.9 MMOL/L — SIGNIFICANT CHANGE UP (ref 3.5–5.3)
POTASSIUM SERPL-SCNC: 3.9 MMOL/L — SIGNIFICANT CHANGE UP (ref 3.5–5.3)
PROT SERPL-MCNC: 7.8 G/DL — SIGNIFICANT CHANGE UP (ref 6–8.3)
PROT UR-MCNC: 20 — SIGNIFICANT CHANGE UP
RBC # BLD: 4.87 M/UL — SIGNIFICANT CHANGE UP (ref 3.8–5.2)
RBC # FLD: 15.2 % — HIGH (ref 10.3–14.5)
RBC CASTS # UR COMP ASSIST: SIGNIFICANT CHANGE UP (ref 0–?)
SAO2 % BLDV: 41 % — LOW (ref 60–85)
SAO2 % BLDV: 91.3 % — HIGH (ref 60–85)
SODIUM SERPL-SCNC: 140 MMOL/L — SIGNIFICANT CHANGE UP (ref 135–145)
SP GR SPEC: 1.02 — SIGNIFICANT CHANGE UP (ref 1–1.03)
SQUAMOUS # UR AUTO: SIGNIFICANT CHANGE UP
UROBILINOGEN FLD QL: NORMAL E.U. — SIGNIFICANT CHANGE UP (ref 0.1–0.2)
WBC # BLD: 8.91 K/UL — SIGNIFICANT CHANGE UP (ref 3.8–10.5)
WBC # FLD AUTO: 8.91 K/UL — SIGNIFICANT CHANGE UP (ref 3.8–10.5)
WBC UR QL: HIGH (ref 0–?)

## 2017-07-21 PROCEDURE — 99285 EMERGENCY DEPT VISIT HI MDM: CPT

## 2017-07-21 PROCEDURE — 74177 CT ABD & PELVIS W/CONTRAST: CPT | Mod: 26

## 2017-07-21 RX ORDER — ONDANSETRON 8 MG/1
4 TABLET, FILM COATED ORAL ONCE
Qty: 0 | Refills: 0 | Status: COMPLETED | OUTPATIENT
Start: 2017-07-21 | End: 2017-07-21

## 2017-07-21 RX ORDER — SODIUM CHLORIDE 9 MG/ML
1000 INJECTION INTRAMUSCULAR; INTRAVENOUS; SUBCUTANEOUS ONCE
Qty: 0 | Refills: 0 | Status: COMPLETED | OUTPATIENT
Start: 2017-07-21 | End: 2017-07-21

## 2017-07-21 RX ORDER — MORPHINE SULFATE 50 MG/1
4 CAPSULE, EXTENDED RELEASE ORAL ONCE
Qty: 0 | Refills: 0 | Status: DISCONTINUED | OUTPATIENT
Start: 2017-07-21 | End: 2017-07-21

## 2017-07-21 RX ADMIN — ONDANSETRON 4 MILLIGRAM(S): 8 TABLET, FILM COATED ORAL at 10:16

## 2017-07-21 RX ADMIN — SODIUM CHLORIDE 1000 MILLILITER(S): 9 INJECTION INTRAMUSCULAR; INTRAVENOUS; SUBCUTANEOUS at 10:15

## 2017-07-21 RX ADMIN — MORPHINE SULFATE 4 MILLIGRAM(S): 50 CAPSULE, EXTENDED RELEASE ORAL at 12:21

## 2017-07-21 RX ADMIN — MORPHINE SULFATE 4 MILLIGRAM(S): 50 CAPSULE, EXTENDED RELEASE ORAL at 11:19

## 2017-07-21 NOTE — ED ADULT NURSE NOTE - CHIEF COMPLAINT QUOTE
[facility]est. pt AOX 3, + right ABD pain pt was seen last wk with similar symptoms

## 2017-07-21 NOTE — ED PROVIDER NOTE - OBJECTIVE STATEMENT
22y F with hx of PTSD, bipolar, cholecystectomy, hyperthyroid, presents to the ED with R sided abd pain and n/v/d since last night. 3 episodes of vomiting so far. Denies urinary sx, fever. Seen a 1wk ago for abd pain and had neg CT scan at the time. Pt states sx improved but began again yesterday.

## 2017-07-21 NOTE — ED PROVIDER NOTE - MEDICAL DECISION MAKING DETAILS
22y F with abd pain likely gastroenteritis. Given tenderness to RLQ will r/o appendicitis. Will do IV fluids, cbc, cmp lipase, ucg, UA, gc chlamydia, Zofran, CT abd and pelvis with contrast.

## 2017-07-21 NOTE — ED ADULT TRIAGE NOTE - CHIEF COMPLAINT QUOTE
[facility] yest. pt AOX 3, + right ABD pain pt was seen last wk with similar symptoms

## 2017-07-23 LAB
C TRACH RRNA SPEC QL NAA+PROBE: SIGNIFICANT CHANGE UP
N GONORRHOEA RRNA SPEC QL NAA+PROBE: SIGNIFICANT CHANGE UP
SPECIMEN SOURCE: SIGNIFICANT CHANGE UP

## 2017-07-31 ENCOUNTER — EMERGENCY (EMERGENCY)
Facility: HOSPITAL | Age: 22
LOS: 1 days | Discharge: HOME | End: 2017-07-31
Admitting: EMERGENCY MEDICINE
Payer: MEDICAID

## 2017-07-31 VITALS
SYSTOLIC BLOOD PRESSURE: 112 MMHG | HEART RATE: 81 BPM | TEMPERATURE: 98 F | DIASTOLIC BLOOD PRESSURE: 69 MMHG | OXYGEN SATURATION: 100 % | RESPIRATION RATE: 17 BRPM

## 2017-07-31 DIAGNOSIS — F12.10 CANNABIS ABUSE, UNCOMPLICATED: ICD-10-CM

## 2017-07-31 DIAGNOSIS — R69 ILLNESS, UNSPECIFIED: ICD-10-CM

## 2017-07-31 DIAGNOSIS — F31.9 BIPOLAR DISORDER, UNSPECIFIED: ICD-10-CM

## 2017-07-31 DIAGNOSIS — F43.10 POST-TRAUMATIC STRESS DISORDER, UNSPECIFIED: ICD-10-CM

## 2017-07-31 PROCEDURE — 99284 EMERGENCY DEPT VISIT MOD MDM: CPT

## 2017-07-31 PROCEDURE — 90792 PSYCH DIAG EVAL W/MED SRVCS: CPT

## 2017-07-31 NOTE — ED BEHAVIORAL HEALTH ASSESSMENT NOTE - OTHER PAST PSYCHIATRIC HISTORY (INCLUDE DETAILS REGARDING ONSET, COURSE OF ILLNESS, INPATIENT/OUTPATIENT TREATMENT)
Patient has a prior diagnosis of bipolar disorder, alcohol use disorder, cannabis use disorder, and rule out borderline personality disorder. She has a history of 10 prior inpatient psychiatric hospitalizations, most recently at [facility] in 11/2016 for suicidality. Patient has a prior diagnosis of bipolar disorder, alcohol use disorder, cannabis use disorder, and rule out borderline personality disorder. She has a history of 10 prior inpatient psychiatric hospitalizations, most recently at [facility] in 11/2016 for suicidality.  Attends [employer] and [employer], Psychiatrist is Dr. South Schwartz ([phone]), Therapist is Ms. Joselyn George ([phone].
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
## 2017-07-31 NOTE — ED BEHAVIORAL HEALTH ASSESSMENT NOTE - MEDICATIONS (PRESCRIPTIONS, DIRECTIONS)
Patient to continue her home medications. Patient to continue her home medications, adherance encouraged, no changes at advised at this time.
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
## 2017-07-31 NOTE — ED BEHAVIORAL HEALTH ASSESSMENT NOTE - CURRENT MEDICATION
Clozapine 100 mg PO BID, Depakote 500 mg PO QHS, isoniazid 300 mg PO daily, Synthroid 0.1mg PO daily, 0.075 mg PO daily, ferrous sulfate 325 mg PO QHS, pyridoxine 50 mg PO daily, ergocalciferol 5000 units PO once a week on Wednesday, Benadryl 50 mg PO QHS PRN insomnia, multivit 1 tab PO daily, Colace 100 mg PO BID, Pepcid 20 mg PO BID, Proventil HFA inhale 2 puffs every 4-6 hours PRN SOB/wheezing, Tylenol 650 mg PO TID daily PRN pain, bismuth subsalicylate 262 mg 2 tablespoons PO TID PRN, petrolatum ointment 1 packet TID daily on the skin PRN. Clozapine 200 mg PO QHS, Depakote  mg PO QHS, Benadryl 100mg PO QHS, isoniazid 300 mg PO daily, Synthroid 0.1mg PO daily and 0.075 mg PO daily, ferrous sulfate 325 mg PO QHS, pyridoxine 50 mg PO daily, ergocalciferol 5000 units PO once a week on Wednesday, multivitamin 1 tab PO daily, Colace 100 mg PO BID, Pepcid 20 mg PO BID, Proventil HFA inhale 2 puffs every 4-6 hours PRN SOB/wheezing, Tylenol 650 mg PO TID PRN pain, bismuth subsalicylate 262 mg 2 tablespoons PO TID PRN, petrolatum ointment 1 packet TID daily on the skin PRN.

## 2017-07-31 NOTE — ED BEHAVIORAL HEALTH ASSESSMENT NOTE - DESCRIPTION
Patient agitated, requiring Haldol 5 mg IM, Benadryl 50 mg IM, Ativan 2 mg IM and then Thorazine 50 mg IM. Patient placed in level 2 restraints. Sutures placed for patient's L forearm lacerations. Obesity, asthma, seizures, gallstones, hypothyroidism. Patient has lived in foster care since the age of 7. She was in different foster homes until age 19 when she was placed in [facility] supportive housing. Patient dropped out of school when she was in 10th grade. She is currently trying to get her GED. Her father is in retirement. Patient is calm, pleasant, cooperative, no acute psychomotor abnormalities, no prns required. Patient has lived in foster care since the age of 7. She was in different foster homes until age 19 when she was placed in [facility] supportive housing.  [place]ives in [address] [place] since May, 2017. Patient dropped out of school when she was in 10th grade. She is currently trying to get her GED. Her father is in [employer].

## 2017-07-31 NOTE — ED BEHAVIORAL HEALTH ASSESSMENT NOTE - SUMMARY
22 year old female with prior diagnosis of bipolar disorder, alcohol use disorder, cannabis use disorder, rule out borderline personality disorder, 10 prior inpatient psychiatric hospitalizations, reported prior suicide attempts, presenting after patient cut her left forearm after discussing the trauma of having a dilatation and curettage. She developed depressed mood and self injurious behavior in reaction to marked distress out of proportion of the stress of talking about her prior trauma, consistent with some symptoms of post-traumatic stress disorder. Although the triage note says that the patient's cutting herself was a suicide attempt, the patient denies that cutting herself was a suicide attempt, reported she was trying to distract herself from her emotional pain, is future-oriented, and feels bad about having scared the people she lives with. 22 year old female with prior diagnosis of bipolar disorder, alcohol use disorder, cannabis use disorder, rule out borderline personality disorder, 10 prior inpatient psychiatric hospitalizations, reported prior suicide attempts, presenting after patient cut endorsed self-injurious thoughts with a noted past history of self injurious behavior.  Patient reports that she developed depressed mood and self injurious thoughts in reaction to marked distress out of proportion of the stress of observing a conflict of peers and discussing her past spontaneous , consistent with some symptoms of post-traumatic stress disorder.  The patient expresses improved mood after evaluation and denies acute mood symptoms, no acute anxiety or depressed mood noted, denies SI or self injurious thoughts at this time.  The patient is future-oriented and expresses comfort with plan to return to residence with patient expressing feeling safe at this time and does not present as an acute risk to self or others at this time.  Patient does not meet criteria for involuntary psychiatric hospitalization at this time.

## 2017-07-31 NOTE — ED BEHAVIORAL HEALTH ASSESSMENT NOTE - DESCRIPTION (FIRST USE, LAST USE, QUANTITY, FREQUENCY, DURATION)
Patient says she used to drink, but not frequently or in excess. Patient says that she stopped drinking 8 months ago. The nurse at the patient's facility says that the patient has a history of alcohol use disorder. Patient used to smoke 6 blunts of marijuana daily until 8 months ago. Patient says she used to drink, but denies recent alcohol use. Patient says that she stopped drinking 9 months ago. There is a noted history of alcohol use disorder. Patient used to smoke 6 blunts of marijuana daily until 9 months ago, noted history of cannabis use disorder, denies recent use.
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
## 2017-07-31 NOTE — ED ADULT TRIAGE NOTE - CHIEF COMPLAINT QUOTE
BIBEMS from [facility] [address]. As per staff, pt was found cutting self, depressed. Hx: borderline personality. Pt refusing to answer questions in triage, appears withdrawn. Refusing vs. Superficial cuts observed to L arm.
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
## 2017-07-31 NOTE — ED BEHAVIORAL HEALTH ASSESSMENT NOTE - REFERRAL / APPOINTMENT DETAILS
Patient to follow up with Dr. Galeano ([phone]). Patient to follow up at [facility] with Dr. Galeano ([phone]) and therapist, Ms. George.  Patient advised to follow up with PCP with appointment scheduled for tomorrow.

## 2017-07-31 NOTE — ED BEHAVIORAL HEALTH ASSESSMENT NOTE - NS ED BHA PLAN TR BH CONTACTED FT
Spoke to Dr. Galeano ([phone]). Message left with Dr. Galeano ([phone]), not reached since after hours.

## 2017-07-31 NOTE — ED BEHAVIORAL HEALTH ASSESSMENT NOTE - SUICIDE PROTECTIVE FACTORS
Supportive social network or family/Engaged in work or school/Future oriented/Identifies reasons for living Positive therapeutic relationships/Future oriented/Engaged in work or school/Identifies reasons for living/Supportive social network or family

## 2017-07-31 NOTE — ED BEHAVIORAL HEALTH ASSESSMENT NOTE - PRIMARY DX
PTSD (post-traumatic stress disorder) Deferred condition on axis II Bipolar affective disorder, remission status unspecified

## 2017-07-31 NOTE — ED BEHAVIORAL HEALTH ASSESSMENT NOTE - AXIS IV
Educational problems/Occupational problems Educational problems/Problem related to social environment

## 2017-07-31 NOTE — ED BEHAVIORAL HEALTH ASSESSMENT NOTE - PATIENT'S CHIEF COMPLAINT
"I didn't want to die." "I was feeling overwhelmed, dealing with my depression, trauma and flashbacks."
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
## 2017-07-31 NOTE — ED BEHAVIORAL HEALTH NOTE - BEHAVIORAL HEALTH NOTE
SW informed pt is stable for d/c and safe to travel via public transportation. ROBERT provided [employer]card serial # [id number].

## 2017-07-31 NOTE — ED PROVIDER NOTE - MEDICAL DECISION MAKING DETAILS
This is a 22 year old Female BIBA from [facility] for psych eval r/t self harm behavior. Patient refusing vital and is currently not speaking to staff. Compliant with verbal directions. Medical evaluation performed. There is no clinical evidence of intoxication or any acute medical problem requiring immediate intervention. Final disposition will be determined by psychiatrist.

## 2017-07-31 NOTE — ED ADULT NURSE NOTE - OBJECTIVE STATEMENT
pt received in , c/o SI, pt totally mute on presentation, refusing to answer questions, refusing vitals. unable to assess pt's suicidality and other presenting symptoms at this time. will reassess

## 2017-07-31 NOTE — ED PROVIDER NOTE - OBJECTIVE STATEMENT
This is a 22 year old Female BIBA from [facility] for psych eval r/t self harm behavior. Patient refusing vital and is currently not speaking to staff. Compliant with verbal directions. Presents with multiple superficial lacerations to Left Forearm. This is a 22 year old Female BIBA from [facility] for psych eval r/t self harm behavior. Patient refusing vital and is currently not speaking to staff. Compliant with verbal directions. Presents with multiple healed superficial lacerations to Left Forearm. Patient is currently calm and cooperative. Speaking to staff states she is here for flashbacks and increased depression. States she did not cut today last time was three weeks ago. Denies chest pain, SOB, N/V/D and fevers, Denies palpitations or diaphoresis. Denies Numbness, Tingling, Blurry Vision and HA.  Denies suicidal/homicidal thoughts. Denies visual/auditory hallucinations. Denies ETOH/Illicit drug use. Denies recent falls, trauma and injuries. Denies pain or any other medical complaints. This is a 22 year old Female BIBA from [facility] for psych eval r/t self harm behavior. Patient refusing vital and is currently not speaking to staff. Compliant with verbal directions. Presents with multiple healed superficial lacerations to Left Forearm.   Addendum: Patient is currently calm and cooperative. Speaking to staff states she is here for flashbacks and increased depression. States she did not cut today last time was three weeks ago. Denies chest pain, SOB, N/V/D and fevers, Denies palpitations or diaphoresis. Denies Numbness, Tingling, Blurry Vision and HA.  Denies suicidal/homicidal thoughts. Denies visual/auditory hallucinations. Denies ETOH/Illicit drug use. Denies recent falls, trauma and injuries. Denies pain or any other medical complaints.

## 2017-07-31 NOTE — ED ADULT NURSE NOTE - CHIEF COMPLAINT QUOTE
BIBEMS from [facility] [address]. As per staff, pt was found cutting self, depressed. Hx: borderline personality. Pt refusing to answer questions in triage, appears withdrawn. Refusing vs. Superficial cuts observed to L arm.

## 2017-07-31 NOTE — ED PROVIDER NOTE - CARE PLAN
Principal Discharge DX:	PTSD (post-traumatic stress disorder)  Secondary Diagnosis:	Bipolar disorder, currently in remission

## 2017-07-31 NOTE — ED BEHAVIORAL HEALTH ASSESSMENT NOTE - DIFFERENTIAL
post-traumatic stress disorder   Borderline personality disorder.  Major depressive disorder.  Bipolar disorder. R/O Borderline personality disorder.  R/O Major depressive disorder.

## 2017-09-13 ENCOUNTER — EMERGENCY (EMERGENCY)
Facility: HOSPITAL | Age: 22
LOS: 1 days | Discharge: PSYCHIATRIC OR SUBSTANCE USE FACILITY | End: 2017-09-13
Attending: EMERGENCY MEDICINE | Admitting: EMERGENCY MEDICINE
Payer: MEDICAID

## 2017-09-13 VITALS
DIASTOLIC BLOOD PRESSURE: 92 MMHG | OXYGEN SATURATION: 96 % | RESPIRATION RATE: 16 BRPM | HEART RATE: 84 BPM | TEMPERATURE: 98 F | SYSTOLIC BLOOD PRESSURE: 144 MMHG

## 2017-09-13 VITALS
DIASTOLIC BLOOD PRESSURE: 70 MMHG | OXYGEN SATURATION: 99 % | TEMPERATURE: 98 F | HEART RATE: 77 BPM | RESPIRATION RATE: 16 BRPM | SYSTOLIC BLOOD PRESSURE: 124 MMHG

## 2017-09-13 DIAGNOSIS — F19.99 OTHER PSYCHOACTIVE SUBSTANCE USE, UNSPECIFIED WITH UNSPECIFIED PSYCHOACTIVE SUBSTANCE-INDUCED DISORDER: ICD-10-CM

## 2017-09-13 DIAGNOSIS — F31.75 BIPOLAR DISORDER, IN PARTIAL REMISSION, MOST RECENT EPISODE DEPRESSED: ICD-10-CM

## 2017-09-13 DIAGNOSIS — F43.10 POST-TRAUMATIC STRESS DISORDER, UNSPECIFIED: ICD-10-CM

## 2017-09-13 LAB
ALBUMIN SERPL ELPH-MCNC: 4.4 G/DL — SIGNIFICANT CHANGE UP (ref 3.3–5)
ALP SERPL-CCNC: 130 U/L — HIGH (ref 40–120)
ALT FLD-CCNC: 31 U/L — SIGNIFICANT CHANGE UP (ref 4–33)
APAP SERPL-MCNC: < 15 UG/ML — LOW (ref 15–25)
AST SERPL-CCNC: 28 U/L — SIGNIFICANT CHANGE UP (ref 4–32)
BARBITURATES MEASUREMENT: NEGATIVE — SIGNIFICANT CHANGE UP
BASOPHILS # BLD AUTO: 0.01 K/UL — SIGNIFICANT CHANGE UP (ref 0–0.2)
BASOPHILS NFR BLD AUTO: 0.2 % — SIGNIFICANT CHANGE UP (ref 0–2)
BENZODIAZ SERPL-MCNC: NEGATIVE — SIGNIFICANT CHANGE UP
BILIRUB SERPL-MCNC: 0.6 MG/DL — SIGNIFICANT CHANGE UP (ref 0.2–1.2)
BUN SERPL-MCNC: 8 MG/DL — SIGNIFICANT CHANGE UP (ref 7–23)
CALCIUM SERPL-MCNC: 9.2 MG/DL — SIGNIFICANT CHANGE UP (ref 8.4–10.5)
CHLORIDE SERPL-SCNC: 103 MMOL/L — SIGNIFICANT CHANGE UP (ref 98–107)
CO2 SERPL-SCNC: 23 MMOL/L — SIGNIFICANT CHANGE UP (ref 22–31)
CREAT SERPL-MCNC: 0.81 MG/DL — SIGNIFICANT CHANGE UP (ref 0.5–1.3)
EOSINOPHIL # BLD AUTO: 0 K/UL — SIGNIFICANT CHANGE UP (ref 0–0.5)
EOSINOPHIL NFR BLD AUTO: 0 % — SIGNIFICANT CHANGE UP (ref 0–6)
ETHANOL BLD-MCNC: < 10 MG/DL — SIGNIFICANT CHANGE UP
GLUCOSE SERPL-MCNC: 97 MG/DL — SIGNIFICANT CHANGE UP (ref 70–99)
HCG SERPL-ACNC: < 5 MIU/ML — SIGNIFICANT CHANGE UP
HCT VFR BLD CALC: 37.5 % — SIGNIFICANT CHANGE UP (ref 34.5–45)
HGB BLD-MCNC: 12 G/DL — SIGNIFICANT CHANGE UP (ref 11.5–15.5)
IMM GRANULOCYTES # BLD AUTO: 0.01 # — SIGNIFICANT CHANGE UP
IMM GRANULOCYTES NFR BLD AUTO: 0.2 % — SIGNIFICANT CHANGE UP (ref 0–1.5)
LYMPHOCYTES # BLD AUTO: 2.43 K/UL — SIGNIFICANT CHANGE UP (ref 1–3.3)
LYMPHOCYTES # BLD AUTO: 42.9 % — SIGNIFICANT CHANGE UP (ref 13–44)
MCHC RBC-ENTMCNC: 24.7 PG — LOW (ref 27–34)
MCHC RBC-ENTMCNC: 32 % — SIGNIFICANT CHANGE UP (ref 32–36)
MCV RBC AUTO: 77.3 FL — LOW (ref 80–100)
MONOCYTES # BLD AUTO: 0.6 K/UL — SIGNIFICANT CHANGE UP (ref 0–0.9)
MONOCYTES NFR BLD AUTO: 10.6 % — SIGNIFICANT CHANGE UP (ref 2–14)
NEUTROPHILS # BLD AUTO: 2.62 K/UL — SIGNIFICANT CHANGE UP (ref 1.8–7.4)
NEUTROPHILS NFR BLD AUTO: 46.1 % — SIGNIFICANT CHANGE UP (ref 43–77)
NRBC # FLD: 0 — SIGNIFICANT CHANGE UP
PLATELET # BLD AUTO: 273 K/UL — SIGNIFICANT CHANGE UP (ref 150–400)
PMV BLD: 11 FL — SIGNIFICANT CHANGE UP (ref 7–13)
POTASSIUM SERPL-MCNC: 3.7 MMOL/L — SIGNIFICANT CHANGE UP (ref 3.5–5.3)
POTASSIUM SERPL-SCNC: 3.7 MMOL/L — SIGNIFICANT CHANGE UP (ref 3.5–5.3)
PROT SERPL-MCNC: 7.8 G/DL — SIGNIFICANT CHANGE UP (ref 6–8.3)
RBC # BLD: 4.85 M/UL — SIGNIFICANT CHANGE UP (ref 3.8–5.2)
RBC # FLD: 16.6 % — HIGH (ref 10.3–14.5)
SALICYLATES SERPL-MCNC: < 5 MG/DL — LOW (ref 15–30)
SODIUM SERPL-SCNC: 142 MMOL/L — SIGNIFICANT CHANGE UP (ref 135–145)
TSH SERPL-MCNC: 223.3 UIU/ML — HIGH (ref 0.27–4.2)
WBC # BLD: 5.67 K/UL — SIGNIFICANT CHANGE UP (ref 3.8–10.5)
WBC # FLD AUTO: 5.67 K/UL — SIGNIFICANT CHANGE UP (ref 3.8–10.5)

## 2017-09-13 PROCEDURE — 90792 PSYCH DIAG EVAL W/MED SRVCS: CPT | Mod: GC

## 2017-09-13 PROCEDURE — 99285 EMERGENCY DEPT VISIT HI MDM: CPT | Mod: 25

## 2017-09-13 RX ORDER — DIPHENHYDRAMINE HCL 50 MG
50 CAPSULE ORAL ONCE
Qty: 0 | Refills: 0 | Status: COMPLETED | OUTPATIENT
Start: 2017-09-13 | End: 2017-09-13

## 2017-09-13 RX ORDER — HALOPERIDOL DECANOATE 100 MG/ML
5 INJECTION INTRAMUSCULAR ONCE
Qty: 0 | Refills: 0 | Status: COMPLETED | OUTPATIENT
Start: 2017-09-13 | End: 2017-09-13

## 2017-09-13 RX ADMIN — Medication 2 MILLIGRAM(S): at 03:41

## 2017-09-13 RX ADMIN — Medication 50 MILLIGRAM(S): at 03:40

## 2017-09-13 RX ADMIN — HALOPERIDOL DECANOATE 5 MILLIGRAM(S): 100 INJECTION INTRAMUSCULAR at 03:41

## 2017-09-13 NOTE — ED PROVIDER NOTE - OBJECTIVE STATEMENT
21 y/o female w/ hx of depression, bipolar, hypothyroid, substance abuse coming from [facility] for [facility] attempt. Most of history from notes and previous HPI, patient refusing to talk. Patient presents w/ cuts to both forearms. Unable to provide more history 23 y/o female w/ hx of depression, bipolar, hypothyroid, substance abuse coming from [facility] for self-inflicted wounds.  Most of history from notes and previous HPI, patient refusing to talk. patient noted to have history of cutting. Patient presents w/ superficial cuts to both forearms. Unable to provide more history
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
## 2017-09-13 NOTE — ED ADULT NURSE NOTE - CHIEF COMPLAINT QUOTE
Pt arrives from [facility] after pt broke a cologne bottle and cut B/L forearms. Pt arrives with arms wraps in bandages, no bleeding to gauze at present. H/o depression, bipolar, hypothyroid, substance abuse. Pt refusing to talk during triage. Pt refusing bandages to be removed for evaluation during triage. As per ems multiple lacerations to B/L forearms.

## 2017-09-13 NOTE — ED BEHAVIORAL HEALTH ASSESSMENT NOTE - NS ED BHA PLAN TR BH CONTACTED FT
Message left with Dr. Galeano ([phone]), not reached since after hours. Message left with Dr. Galeano ([phone]), not reached.

## 2017-09-13 NOTE — PROVIDER CONTACT NOTE (OTHER) - ASSESSMENT
Social work contacted for transportation. Spoke with [place] [phone] to notify the facility that patient was being discharged from the ER. Confirmed patient return address and contact number. Arranged transportation [employer] [phone] for patient via [facility] for immediate pick-up. Patient and facility aware and in agreement with plan.

## 2017-09-13 NOTE — ED PROVIDER NOTE - ATTENDING CONTRIBUTION TO CARE
pt presents after an episode of self cutting.  Initially unwilling to participate in history.  Became agitated when trying to draw blood and required chemical anxiolysis.  Labs show no ingestions. On further discussion with patient it seems this was a borderline episode in nature and not significant SI.  Found to have a very elevated TSH - confirmed with [place] that she does not routinely take her synthroid in the morning as she "sleeps late".  I do not feel that this patient would require psychiatric admission.  None of the abrasions would require intervention
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
## 2017-09-13 NOTE — ED BEHAVIORAL HEALTH ASSESSMENT NOTE - REFERRAL / APPOINTMENT DETAILS
Patient to follow up at [facility] [place] with Dr. Galeano ([phone]) and therapist, Ms. George.  Patient advised to follow up with PCP with appointment scheduled for tomorrow. Patient to follow up at [facility] with Dr. Galeano and therapist, Ms. George.  Patient advised to follow up with PCP with appointment scheduled for tomorrow.

## 2017-09-13 NOTE — ED ADULT NURSE REASSESSMENT NOTE - NS ED NURSE REASSESS COMMENT FT1
Patient received AA&Ox3. VSS on RA. Constant observation maintained for patient safety - SI/attempt. Patient denies chest pain, N/V, SOB, fever, chills, dyspnea at this time. Patient pending dispo from team, NAD noted - will continue to monitor.

## 2017-09-13 NOTE — ED BEHAVIORAL HEALTH ASSESSMENT NOTE - DESCRIPTION (FIRST USE, LAST USE, QUANTITY, FREQUENCY, DURATION)
Patient says she used to drink, but denies recent alcohol use. Patient says that she stopped drinking 9 months ago. There is a noted history of alcohol use disorder. Patient used to smoke 6 blunts of marijuana daily until 9 months ago, noted history of cannabis use disorder, denies recent use. There is a noted history of alcohol use disorder. She last drank last Friday. Drank a whole bottle of vodka and had to go to the ER at [place]. Noted history of cannabis use disorder. Smokes 10-20 blunts per day. Last smoked yesterday.

## 2017-09-13 NOTE — ED BEHAVIORAL HEALTH ASSESSMENT NOTE - CURRENT MEDICATION
Per previous ED note: Clozapine 200 mg PO QHS, Depakote  mg PO QHS, Benadryl 100mg PO QHS, isoniazid 300 mg PO daily, Synthroid 0.1mg PO daily and 0.075 mg PO daily, ferrous sulfate 325 mg PO QHS, pyridoxine 50 mg PO daily, ergocalciferol 5000 units PO once a week on Wednesday, multivitamin 1 tab PO daily, Colace 100 mg PO BID, Pepcid 20 mg PO BID, Proventil HFA inhale 2 puffs every 4-6 hours PRN SOB/wheezing, Tylenol 650 mg PO TID PRN pain, bismuth subsalicylate 262 mg 2 tablespoons PO TID PRN, petrolatum ointment 1 packet TID daily on the skin PRN. Patient currently has difficulty recalling her medications. Medication list obtained from previous ED note (7/31/17): Clozapine 200 mg PO QHS, Depakote  mg PO QHS, Benadryl 100mg PO QHS, isoniazid 300 mg PO daily, Synthroid 0.1mg PO daily and 0.075 mg PO daily, ferrous sulfate 325 mg PO QHS, pyridoxine 50 mg PO daily, ergocalciferol 5000 units PO once a week on Wednesday, multivitamin 1 tab PO daily, Colace 100 mg PO BID, Pepcid 20 mg PO BID, Proventil HFA inhale 2 puffs every 4-6 hours PRN SOB/wheezing, Tylenol 650 mg PO TID PRN pain, bismuth subsalicylate 262 mg 2 tablespoons PO TID PRN, petrolatum ointment 1 packet TID daily on the skin PRN.

## 2017-09-13 NOTE — ED BEHAVIORAL HEALTH ASSESSMENT NOTE - SUICIDE RISK FACTORS
Mood episode/Highly impulsive behavior/Substance abuse/dependence/Access to means (pills, firearms, etc.)/History of abuse/trauma/Global insomnia

## 2017-09-13 NOTE — ED PROVIDER NOTE - PLAN OF CARE
Follow up with your Doctor in 1-2 days.  Rest.  Drink plenty of fluids.  Follow up with your Therapist in 1-2 days Dr Galeano [phone].  Take your Thyroid Medication as prescribed.  Follow up with an Endocrinologist in 1-2 days.  Return to the ER for any persistent/worsening or new symptoms weakness, dizziness, fevers, chills, thoughts of wanting to harm yourself or some else.  chest pain, shortness of breath, weakness, dizziness or any concerning symptoms.

## 2017-09-13 NOTE — ED ADULT NURSE REASSESSMENT NOTE - NS ED NURSE REASSESS COMMENT FT1
pt became increasingly agitated, scratching at her cuts, pt not redirectable, refusing lab draw. pt medicated per MD orders, arms cleaned and wrapped with gauze. labs drawn and sent, CO remains at bedside for 1:1, will continue to monitor pt.
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
## 2017-09-13 NOTE — ED PROVIDER NOTE - PROGRESS NOTE DETAILS
LAITH Braden: Received sign out from Dr Walker to follow up Psych consult.  Pt resting comfortably states she feels better denies SI/HI/AH/VH A&Ox3.  Pt was seen and evaluated by Psych and cleared for discharge back to [place].  Spoke to JEREMY Agosto at [place] advised importance of taking her Thyroid medication as prescribed and for follow up with Endocrinology (pt doesn't take it every day).  Results and discharged reviewed with patient.    Social work set up transport back to [place].

## 2017-09-13 NOTE — ED ADULT NURSE NOTE - OBJECTIVE STATEMENT
pt received to room 11, sent in from [facility] for cutting herself. pt noted with many superficial cuts on B/L arms from upper arm to wrists. pt refusing to answer questions. VS as noted, in NAD, placed on 1:1 for safety. will continue to monitor pt.

## 2017-09-13 NOTE — ED BEHAVIORAL HEALTH ASSESSMENT NOTE - OTHER PAST PSYCHIATRIC HISTORY (INCLUDE DETAILS REGARDING ONSET, COURSE OF ILLNESS, INPATIENT/OUTPATIENT TREATMENT)
Patient has a prior diagnosis of bipolar disorder, alcohol use disorder, cannabis use disorder, and rule out borderline personality disorder. She has a history of 10 prior inpatient psychiatric hospitalizations, most recently at [facility] in 11/2016 for suicidality.  Attends [employer] and [employer], Psychiatrist is Dr. [place] Schwartz ([phone]), Therapist is Ms. Joselyn George ([phone].

## 2017-09-13 NOTE — ED BEHAVIORAL HEALTH ASSESSMENT NOTE - SUICIDE PROTECTIVE FACTORS
Positive therapeutic relationships/Engaged in work or school/Supportive social network or family/Identifies reasons for living/Future oriented
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
## 2017-09-13 NOTE — ED BEHAVIORAL HEALTH ASSESSMENT NOTE - MEDICATIONS (PRESCRIPTIONS, DIRECTIONS)
Patient to continue her home medications, adherance encouraged, no changes at advised at this time. Patient to continue her home medications, adherence encouraged, no changes at advised at this time.

## 2017-09-13 NOTE — ED BEHAVIORAL HEALTH ASSESSMENT NOTE - PRIMARY DX
Bipolar affective disorder, remission status unspecified Deferred condition on axis II Bipolar affective disorder, depressed, in partial or unspecified remission

## 2017-09-13 NOTE — ED BEHAVIORAL HEALTH ASSESSMENT NOTE - LANGUAGE
@ 17w 3d  No OB complaints today   AFP Quad collected  Morph US ordered  Feels flutters  Reviewed weight gain in pregnancy  Discussed PTL precautions  She verbalizes understanding of all teaching  See flow sheet  RTC in 4 wks No abnormalities noted

## 2017-09-13 NOTE — ED PROVIDER NOTE - CARE PLAN
Principal Discharge DX:	Self-inflicted injury Principal Discharge DX:	Self-inflicted injury  Instructions for follow-up, activity and diet:	Follow up with your Doctor in 1-2 days.  Rest.  Drink plenty of fluids.  Follow up with your Therapist in 1-2 days Dr Galeano [phone].  Take your Thyroid Medication as prescribed.  Follow up with an Endocrinologist in 1-2 days.  Return to the ER for any persistent/worsening or new symptoms weakness, dizziness, fevers, chills, thoughts of wanting to harm yourself or some else.  chest pain, shortness of breath, weakness, dizziness or any concerning symptoms. Principal Discharge DX:	Self-inflicted injury  Instructions for follow-up, activity and diet:	Follow up with your Doctor in 1-2 days.  Rest.  Drink plenty of fluids.  Follow up with your Therapist in 1-2 days Dr Galeano [phone].  Take your Thyroid Medication as prescribed.  Follow up with an Endocrinologist in 1-2 days.  Return to the ER for any persistent/worsening or new symptoms weakness, dizziness, fevers, chills, thoughts of wanting to harm yourself or some else.  chest pain, shortness of breath, weakness, dizziness or any concerning symptoms.  Secondary Diagnosis:	Hypothyroidism, unspecified type

## 2017-09-13 NOTE — ED BEHAVIORAL HEALTH ASSESSMENT NOTE - SUMMARY
Patient is a 23yo  woman, in a relationship, domiciled at [place] [id number], taking classes to get her GED, with past psych history of bipolar disorder, cannabis use disorder, alcohol use disorder, r/o borderline personality disorder, 10 prior inpatient psychiatric hospitalizations, most recently at [facility] on 11/22/16 for suicidality, history of suicide attempts and cutting, with past medical history of hyperthyroidism, obesity, asthma, seizures, and gallstones, who was brought to [facility] ED by EMS after cutting her R/L forearms with a broken cologne bottle.    Patient was calm and cooperative during the evaluation. She was also pleasant and cooperative with the ED staff. She states having depressed mood and having low energy. She feels guilt, blaming "[herself] for everything" including cutting. She has trouble sleeping at night but sleeps through the morning. Though she was cutting to "forget [her] pain," she denies suicidal ideation, plan, or intent, and denies homicidal ideation. She also denies AH, VH, paranoia, and symptoms of parish. She feels hopeful and motivated about the future and looks forward to finishing her GED and studying to become a midwife nurse. Her recent TSH level in the ED is elevated at 223.30. The writer counseled the patient on taking her medications (including her Synthroid) as prescribed. The patient was open to the recommendations and agreed. Patient does not present as an acute risk to self or others at this time, and does not meet criteria for psychiatric hospitalization. Patient is a 21yo  woman, in a relationship, domiciled at [place] [address], taking classes to get her GED, with past psych history of bipolar disorder, cannabis use disorder, alcohol use disorder, r/o borderline personality disorder, 10 prior inpatient psychiatric hospitalizations, most recently at [facility] on 11/22/16 for suicidality, history of suicide attempts and cutting, with past medical history of hyperthyroidism, obesity, asthma, seizures, and gallstones, who was brought to [facility] ED by EMS after cutting her R/L forearms with a broken cologne bottle.    Patient was calm and cooperative during the evaluation. She was also pleasant and cooperative with the ED staff. She states having depressed mood and having low energy. She feels guilt, blaming "[herself] for everything" including cutting. She has trouble sleeping at night but sleeps through the morning. Though she was cutting to "forget [her] pain," she denies suicidal ideation, plan, or intent, and denies homicidal ideation. She also denies AH, VH, paranoia, and symptoms of parish. She feels hopeful and motivated about the future and looks forward to finishing her GED and studying to become a midwife nurse. Her recent TSH level in the ED is elevated at 223.30. The writer counseled the patient on taking her medications (including her Synthroid) as prescribed. The patient was open to the recommendations and agreed. Patient does not present as an acute risk to self or others at this time, and does not meet criteria for psychiatric hospitalization.
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
## 2017-10-29 ENCOUNTER — EMERGENCY (EMERGENCY)
Facility: HOSPITAL | Age: 22
LOS: 1 days | Discharge: HOME | End: 2017-10-29
Attending: EMERGENCY MEDICINE | Admitting: EMERGENCY MEDICINE
Payer: MEDICAID

## 2017-10-29 VITALS
OXYGEN SATURATION: 100 % | RESPIRATION RATE: 16 BRPM | DIASTOLIC BLOOD PRESSURE: 57 MMHG | SYSTOLIC BLOOD PRESSURE: 101 MMHG | TEMPERATURE: 99 F | HEART RATE: 109 BPM

## 2017-10-29 LAB
BASE EXCESS BLDV CALC-SCNC: 2.3 MMOL/L — SIGNIFICANT CHANGE UP
BASOPHILS # BLD AUTO: 0.01 K/UL — SIGNIFICANT CHANGE UP (ref 0–0.2)
BASOPHILS NFR BLD AUTO: 0.2 % — SIGNIFICANT CHANGE UP (ref 0–2)
BLOOD GAS VENOUS - CREATININE: 0.75 MG/DL — SIGNIFICANT CHANGE UP (ref 0.5–1.3)
CHLORIDE BLDV-SCNC: 104 MMOL/L — SIGNIFICANT CHANGE UP (ref 96–108)
EOSINOPHIL # BLD AUTO: 0 K/UL — SIGNIFICANT CHANGE UP (ref 0–0.5)
EOSINOPHIL NFR BLD AUTO: 0 % — SIGNIFICANT CHANGE UP (ref 0–6)
GAS PNL BLDV: 139 MMOL/L — SIGNIFICANT CHANGE UP (ref 136–146)
GLUCOSE BLDV-MCNC: 98 — SIGNIFICANT CHANGE UP (ref 70–99)
HCG SERPL-ACNC: < 5 MIU/ML — SIGNIFICANT CHANGE UP
HCO3 BLDV-SCNC: 25 MMOL/L — SIGNIFICANT CHANGE UP (ref 20–27)
HCT VFR BLD CALC: 41 % — SIGNIFICANT CHANGE UP (ref 34.5–45)
HCT VFR BLDV CALC: 42.1 % — SIGNIFICANT CHANGE UP (ref 34.5–45)
HGB BLD-MCNC: 13 G/DL — SIGNIFICANT CHANGE UP (ref 11.5–15.5)
HGB BLDV-MCNC: 13.7 G/DL — SIGNIFICANT CHANGE UP (ref 11.5–15.5)
IMM GRANULOCYTES # BLD AUTO: 0 # — SIGNIFICANT CHANGE UP
IMM GRANULOCYTES NFR BLD AUTO: 0 % — SIGNIFICANT CHANGE UP (ref 0–1.5)
LACTATE BLDV-MCNC: 1.2 MMOL/L — SIGNIFICANT CHANGE UP (ref 0.5–2)
LYMPHOCYTES # BLD AUTO: 1.32 K/UL — SIGNIFICANT CHANGE UP (ref 1–3.3)
LYMPHOCYTES # BLD AUTO: 25.6 % — SIGNIFICANT CHANGE UP (ref 13–44)
MCHC RBC-ENTMCNC: 25 PG — LOW (ref 27–34)
MCHC RBC-ENTMCNC: 31.7 % — LOW (ref 32–36)
MCV RBC AUTO: 78.7 FL — LOW (ref 80–100)
MONOCYTES # BLD AUTO: 0.45 K/UL — SIGNIFICANT CHANGE UP (ref 0–0.9)
MONOCYTES NFR BLD AUTO: 8.7 % — SIGNIFICANT CHANGE UP (ref 2–14)
NEUTROPHILS # BLD AUTO: 3.37 K/UL — SIGNIFICANT CHANGE UP (ref 1.8–7.4)
NEUTROPHILS NFR BLD AUTO: 65.5 % — SIGNIFICANT CHANGE UP (ref 43–77)
NRBC # FLD: 0 — SIGNIFICANT CHANGE UP
PCO2 BLDV: 46 MMHG — SIGNIFICANT CHANGE UP (ref 41–51)
PH BLDV: 7.38 PH — SIGNIFICANT CHANGE UP (ref 7.32–7.43)
PLATELET # BLD AUTO: 273 K/UL — SIGNIFICANT CHANGE UP (ref 150–400)
PMV BLD: 11.4 FL — SIGNIFICANT CHANGE UP (ref 7–13)
PO2 BLDV: 30 MMHG — LOW (ref 35–40)
POTASSIUM BLDV-SCNC: 4.5 MMOL/L — SIGNIFICANT CHANGE UP (ref 3.4–4.5)
RBC # BLD: 5.21 M/UL — HIGH (ref 3.8–5.2)
RBC # FLD: 15.3 % — HIGH (ref 10.3–14.5)
SAO2 % BLDV: 51.5 % — LOW (ref 60–85)
WBC # BLD: 5.15 K/UL — SIGNIFICANT CHANGE UP (ref 3.8–10.5)
WBC # FLD AUTO: 5.15 K/UL — SIGNIFICANT CHANGE UP (ref 3.8–10.5)

## 2017-10-29 PROCEDURE — 99284 EMERGENCY DEPT VISIT MOD MDM: CPT | Mod: 25

## 2017-10-29 RX ORDER — ONDANSETRON 8 MG/1
4 TABLET, FILM COATED ORAL ONCE
Qty: 0 | Refills: 0 | Status: COMPLETED | OUTPATIENT
Start: 2017-10-29 | End: 2017-10-29

## 2017-10-29 RX ORDER — SODIUM CHLORIDE 9 MG/ML
2000 INJECTION INTRAMUSCULAR; INTRAVENOUS; SUBCUTANEOUS ONCE
Qty: 0 | Refills: 0 | Status: COMPLETED | OUTPATIENT
Start: 2017-10-29 | End: 2017-10-29

## 2017-10-29 RX ADMIN — SODIUM CHLORIDE 2000 MILLILITER(S): 9 INJECTION INTRAMUSCULAR; INTRAVENOUS; SUBCUTANEOUS at 23:32

## 2017-10-29 RX ADMIN — ONDANSETRON 4 MILLIGRAM(S): 8 TABLET, FILM COATED ORAL at 23:32

## 2017-10-29 NOTE — ED PROVIDER NOTE - ATTENDING CONTRIBUTION TO CARE
I, Jennifer Cabot, MD, have performed a history and physical exam of the patient and discussed their management with the resident. I reviewed the resident's note and agree with the documented findings and plan of care. My medical decision making and observations are found above.    Cabot: 22F with PMH of bipolar d/o, PTSD, hypothyroidism, and asthma, BIBA from [employer] [facility] with 3 days of viral sx including congestion, dry cough, rhinorrhea, diffuse abdominal pain, nausea, NBNB vomiting, and diarrhea.  + HA and myalgias.  Subjective fever, chills.  On exam, non toxic appearing, lungs CTAB, heart sounds tachy but regular, oropharynx mildly erythematous but no lesions, abd benign, no CVAT, LEs without edema.  DDx includes viral syndrome, less likely PNA, UTI, biliary infection, other source.  Will check basic labs, LFTs, bilis, AP, UA, CXR, hydrate, give antiemetics, and reassess.

## 2017-10-29 NOTE — ED PROVIDER NOTE - PROGRESS NOTE DETAILS
Alex Suarez MD [facility] Note: 23 yo woman here w/ vomiting. Has had several days of fever, n/v/d, abd discomfort. Non bloody, non bilious. No cough, urinary symptoms, rash, travel. Physical Exam: *Gen: NAD, AOx3; *Eyes: PERRL; *HEENT: Airway patent, MMM; *CV: RRR, S1/S2; *Resp: CTAB, non-labored; *Abd: soft, non tender, no guarding; *Skin: dry, intact; *Neuro: AOx3, no deficits. A/P: 23 yo woman w/ subjective fever, n/v/d. Likely gastroenteritis, especially in setting of non tender abdomen. Labs, cxr, ua, ivf, symptomatic treatment.

## 2017-10-29 NOTE — ED PROVIDER NOTE - CARE PLAN
Principal Discharge DX:	URI (upper respiratory infection)  Secondary Diagnosis:	UTI (urinary tract infection)

## 2017-10-29 NOTE — ED PROVIDER NOTE - MEDICAL DECISION MAKING DETAILS
22yoF h/o bipolar, PTSD, hypothyroid, asthma BIBA from [facility] who presents w/ n/v/d onset 3 days prior. Associated w/ abdominal pain, chills, cough, sore throat, nasal congestion, HA, and dizziness. Pt has several recent sick contacts at her facility. CXR and labs pending to eval for likely infectious source. 22yoF h/o bipolar, PTSD, hypothyroid, asthma BIBA from [facility] who presents w/ n/v/d onset 3 days prior. Associated w/ abdominal pain, chills, cough, sore throat, nasal congestion, HA, and dizziness. Pt has several recent sick contacts at her facility. CXR and labs pending to eval for likely infectious source.    Cabot: 22F with PMH of bipolar d/o, PTSD, hypothyroidism, and asthma, BIBA from [place] with 3 days of viral sx including congestion, dry cough, rhinorrhea, diffuse abdominal pain, nausea, NBNB vomiting, and diarrhea.  + HA and myalgias.  Subjective fever, chills.  On exam, non toxic appearing, lungs CTAB, heart sounds tachy but regular, oropharynx mildly erythematous but no lesions, abd benign, no CVAT, LEs without edema.  DDx includes viral syndrome, less likely PNA, UTI, biliary infection, other source.  Will check basic labs, LFTs, bilis, AP, UA, CXR, hydrate, give antiemetics, and reassess.

## 2017-10-29 NOTE — ED ADULT TRIAGE NOTE - CHIEF COMPLAINT QUOTE
pt brought in by EMS from [facility], pt c.o nausea/vomiting/diarrhea, dizziness, headache, abdominal pain since Thursday. pmh bipolar, hypoparathyroid, asthma, ptsd

## 2017-10-29 NOTE — ED ADULT NURSE NOTE - CHIEF COMPLAINT QUOTE
pt brought in by EMS from [facility], pt c.o nausea/vomiting/diarrhea, dizziness, headache, abdominal pain since Thursday. pmh bipolar, hypoparathyroid, asthma, ptsd

## 2017-10-29 NOTE — ED ADULT NURSE NOTE - OBJECTIVE STATEMENT
Alert and oriented x 4. Pt received to spot 13 due to nausea and vomiting . Pt states : " For the past 5 days I been having chills, nausea, vomiting, dizziness, weakness and diarrhea. Pt denies chest pain,  shortness of breath or painful urination. IV 20g to right AC. Labs drawn. VSS. Boyfriend at bedside. Will continue to monitor.

## 2017-10-29 NOTE — ED PROVIDER NOTE - FAMILY HISTORY
Father  Still living? Unknown  Family history of heart disease, Age at diagnosis: Age Unknown     Mother  Still living? Unknown  Family history of substance abuse, Age at diagnosis: Age Unknown

## 2017-10-29 NOTE — ED PROVIDER NOTE - OBJECTIVE STATEMENT
22yoF h/o bipolar, PTSD, hypothyroid, asthma BIBA from [facility] who presents w/ n/v/d onset 3 days prior. Associated w/ abdominal pain, chills, cough, sore throat, nasal congestion, HA, and dizziness. She states that 3 days ago she began to experience chills, cough, and n/v/d. She reports diarrhea x3/day and vomiting x2-3/day. She states vomiting is sometimes post-tussive and sometimes a/w nausea. She slept most of that day her sxs started and the subsequent day but found that symptoms were worsening yesterday. She also began to experience intermittent HA and dizziness. She reports sick contacts. Pt has been unable to tolerate PO medications. She denies recent substance, urinary sxs, falls, LOC, weakness, numbness, use or any other concerns.

## 2017-10-30 VITALS
OXYGEN SATURATION: 98 % | RESPIRATION RATE: 18 BRPM | SYSTOLIC BLOOD PRESSURE: 107 MMHG | DIASTOLIC BLOOD PRESSURE: 61 MMHG | TEMPERATURE: 98 F | HEART RATE: 88 BPM

## 2017-10-30 LAB
ALBUMIN SERPL ELPH-MCNC: 4.5 G/DL — SIGNIFICANT CHANGE UP (ref 3.3–5)
ALP SERPL-CCNC: 133 U/L — HIGH (ref 40–120)
ALT FLD-CCNC: 38 U/L — HIGH (ref 4–33)
APPEARANCE UR: CLEAR — SIGNIFICANT CHANGE UP
AST SERPL-CCNC: 25 U/L — SIGNIFICANT CHANGE UP (ref 4–32)
BACTERIA # UR AUTO: SIGNIFICANT CHANGE UP
BILIRUB SERPL-MCNC: 0.4 MG/DL — SIGNIFICANT CHANGE UP (ref 0.2–1.2)
BILIRUB UR-MCNC: NEGATIVE — SIGNIFICANT CHANGE UP
BLOOD UR QL VISUAL: HIGH
BUN SERPL-MCNC: 10 MG/DL — SIGNIFICANT CHANGE UP (ref 7–23)
CALCIUM SERPL-MCNC: 9.1 MG/DL — SIGNIFICANT CHANGE UP (ref 8.4–10.5)
CHLORIDE SERPL-SCNC: 102 MMOL/L — SIGNIFICANT CHANGE UP (ref 98–107)
CO2 SERPL-SCNC: 23 MMOL/L — SIGNIFICANT CHANGE UP (ref 22–31)
COLOR SPEC: YELLOW — SIGNIFICANT CHANGE UP
CREAT SERPL-MCNC: 0.85 MG/DL — SIGNIFICANT CHANGE UP (ref 0.5–1.3)
GLUCOSE SERPL-MCNC: 101 MG/DL — HIGH (ref 70–99)
GLUCOSE UR-MCNC: NEGATIVE — SIGNIFICANT CHANGE UP
KETONES UR-MCNC: SIGNIFICANT CHANGE UP
LEUKOCYTE ESTERASE UR-ACNC: HIGH
LIDOCAIN IGE QN: 23.7 U/L — SIGNIFICANT CHANGE UP (ref 7–60)
MUCOUS THREADS # UR AUTO: SIGNIFICANT CHANGE UP
NITRITE UR-MCNC: NEGATIVE — SIGNIFICANT CHANGE UP
PH UR: 6 — SIGNIFICANT CHANGE UP (ref 4.6–8)
POTASSIUM SERPL-MCNC: 4 MMOL/L — SIGNIFICANT CHANGE UP (ref 3.5–5.3)
POTASSIUM SERPL-SCNC: 4 MMOL/L — SIGNIFICANT CHANGE UP (ref 3.5–5.3)
PROT SERPL-MCNC: 7.9 G/DL — SIGNIFICANT CHANGE UP (ref 6–8.3)
PROT UR-MCNC: 30 — HIGH
RBC CASTS # UR COMP ASSIST: SIGNIFICANT CHANGE UP (ref 0–?)
SODIUM SERPL-SCNC: 143 MMOL/L — SIGNIFICANT CHANGE UP (ref 135–145)
SP GR SPEC: 1.02 — SIGNIFICANT CHANGE UP (ref 1–1.03)
SQUAMOUS # UR AUTO: SIGNIFICANT CHANGE UP
UROBILINOGEN FLD QL: NORMAL E.U. — SIGNIFICANT CHANGE UP (ref 0.1–0.2)
WBC UR QL: HIGH (ref 0–?)

## 2017-10-30 PROCEDURE — 71020: CPT | Mod: 26

## 2017-10-30 RX ORDER — CEPHALEXIN 500 MG
500 CAPSULE ORAL ONCE
Qty: 0 | Refills: 0 | Status: COMPLETED | OUTPATIENT
Start: 2017-10-30 | End: 2017-10-30

## 2017-10-30 RX ORDER — CEPHALEXIN 500 MG
1 CAPSULE ORAL
Qty: 14 | Refills: 0 | OUTPATIENT
Start: 2017-10-30 | End: 2017-11-06

## 2017-10-30 RX ADMIN — Medication 500 MILLIGRAM(S): at 02:21

## 2017-10-31 LAB — SPECIMEN SOURCE: SIGNIFICANT CHANGE UP

## 2017-11-01 ENCOUNTER — OUTPATIENT (OUTPATIENT)
Dept: OUTPATIENT SERVICES | Facility: HOSPITAL | Age: 22
LOS: 1 days | End: 2017-11-01

## 2017-11-01 LAB
-  AMIKACIN: SIGNIFICANT CHANGE UP
-  AMPICILLIN/SULBACTAM: SIGNIFICANT CHANGE UP
-  AMPICILLIN: SIGNIFICANT CHANGE UP
-  AZTREONAM: SIGNIFICANT CHANGE UP
-  CEFAZOLIN: SIGNIFICANT CHANGE UP
-  CEFEPIME: SIGNIFICANT CHANGE UP
-  CEFOXITIN: SIGNIFICANT CHANGE UP
-  CEFTAZIDIME: SIGNIFICANT CHANGE UP
-  CEFTRIAXONE: SIGNIFICANT CHANGE UP
-  CIPROFLOXACIN: SIGNIFICANT CHANGE UP
-  ERTAPENEM: SIGNIFICANT CHANGE UP
-  GENTAMICIN: SIGNIFICANT CHANGE UP
-  IMIPENEM: SIGNIFICANT CHANGE UP
-  LEVOFLOXACIN: SIGNIFICANT CHANGE UP
-  MEROPENEM: SIGNIFICANT CHANGE UP
-  NITROFURANTOIN: SIGNIFICANT CHANGE UP
-  PIPERACILLIN/TAZOBACTAM: SIGNIFICANT CHANGE UP
-  TIGECYCLINE: SIGNIFICANT CHANGE UP
-  TOBRAMYCIN: SIGNIFICANT CHANGE UP
-  TRIMETHOPRIM/SULFAMETHOXAZOLE: SIGNIFICANT CHANGE UP
BACTERIA UR CULT: SIGNIFICANT CHANGE UP
METHOD TYPE: SIGNIFICANT CHANGE UP
ORGANISM # SPEC MICROSCOPIC CNT: SIGNIFICANT CHANGE UP
ORGANISM # SPEC MICROSCOPIC CNT: SIGNIFICANT CHANGE UP

## 2017-11-01 NOTE — ED POST DISCHARGE NOTE - RESULT SUMMARY
UCX : E. Coli > 100,000 prelim. Patient discharged home with a prescription for Keflex. Admin P.A. to follow results to final.

## 2017-11-17 ENCOUNTER — EMERGENCY (EMERGENCY)
Facility: HOSPITAL | Age: 22
LOS: 1 days | Discharge: HOME | End: 2017-11-17
Attending: EMERGENCY MEDICINE | Admitting: EMERGENCY MEDICINE
Payer: MEDICAID

## 2017-11-17 VITALS
OXYGEN SATURATION: 100 % | HEART RATE: 60 BPM | RESPIRATION RATE: 18 BRPM | DIASTOLIC BLOOD PRESSURE: 74 MMHG | TEMPERATURE: 98 F | SYSTOLIC BLOOD PRESSURE: 127 MMHG

## 2017-11-17 LAB
ALBUMIN SERPL ELPH-MCNC: 4.3 G/DL — SIGNIFICANT CHANGE UP (ref 3.3–5)
ALP SERPL-CCNC: 131 U/L — HIGH (ref 40–120)
ALT FLD-CCNC: 27 U/L — SIGNIFICANT CHANGE UP (ref 4–33)
APPEARANCE UR: SIGNIFICANT CHANGE UP
AST SERPL-CCNC: 20 U/L — SIGNIFICANT CHANGE UP (ref 4–32)
BACTERIA # UR AUTO: SIGNIFICANT CHANGE UP
BASOPHILS # BLD AUTO: 0.02 K/UL — SIGNIFICANT CHANGE UP (ref 0–0.2)
BASOPHILS NFR BLD AUTO: 0.4 % — SIGNIFICANT CHANGE UP (ref 0–2)
BILIRUB SERPL-MCNC: 0.4 MG/DL — SIGNIFICANT CHANGE UP (ref 0.2–1.2)
BILIRUB UR-MCNC: NEGATIVE — SIGNIFICANT CHANGE UP
BLOOD UR QL VISUAL: HIGH
BUN SERPL-MCNC: 6 MG/DL — LOW (ref 7–23)
CALCIUM SERPL-MCNC: 9 MG/DL — SIGNIFICANT CHANGE UP (ref 8.4–10.5)
CHLORIDE SERPL-SCNC: 104 MMOL/L — SIGNIFICANT CHANGE UP (ref 98–107)
CO2 SERPL-SCNC: 25 MMOL/L — SIGNIFICANT CHANGE UP (ref 22–31)
COD CRY URNS QL: SIGNIFICANT CHANGE UP (ref 0–0)
COLOR SPEC: YELLOW — SIGNIFICANT CHANGE UP
CREAT SERPL-MCNC: 0.76 MG/DL — SIGNIFICANT CHANGE UP (ref 0.5–1.3)
EOSINOPHIL # BLD AUTO: 0 K/UL — SIGNIFICANT CHANGE UP (ref 0–0.5)
EOSINOPHIL NFR BLD AUTO: 0 % — SIGNIFICANT CHANGE UP (ref 0–6)
GLUCOSE SERPL-MCNC: 120 MG/DL — HIGH (ref 70–99)
GLUCOSE UR-MCNC: NEGATIVE — SIGNIFICANT CHANGE UP
HCT VFR BLD CALC: 41.7 % — SIGNIFICANT CHANGE UP (ref 34.5–45)
HGB BLD-MCNC: 13.1 G/DL — SIGNIFICANT CHANGE UP (ref 11.5–15.5)
IMM GRANULOCYTES # BLD AUTO: 0.01 # — SIGNIFICANT CHANGE UP
IMM GRANULOCYTES NFR BLD AUTO: 0.2 % — SIGNIFICANT CHANGE UP (ref 0–1.5)
KETONES UR-MCNC: SIGNIFICANT CHANGE UP
LEUKOCYTE ESTERASE UR-ACNC: HIGH
LIDOCAIN IGE QN: 21.2 U/L — SIGNIFICANT CHANGE UP (ref 7–60)
LYMPHOCYTES # BLD AUTO: 1.52 K/UL — SIGNIFICANT CHANGE UP (ref 1–3.3)
LYMPHOCYTES # BLD AUTO: 31.5 % — SIGNIFICANT CHANGE UP (ref 13–44)
MCHC RBC-ENTMCNC: 24.6 PG — LOW (ref 27–34)
MCHC RBC-ENTMCNC: 31.4 % — LOW (ref 32–36)
MCV RBC AUTO: 78.2 FL — LOW (ref 80–100)
MONOCYTES # BLD AUTO: 0.44 K/UL — SIGNIFICANT CHANGE UP (ref 0–0.9)
MONOCYTES NFR BLD AUTO: 9.1 % — SIGNIFICANT CHANGE UP (ref 2–14)
MUCOUS THREADS # UR AUTO: SIGNIFICANT CHANGE UP
NEUTROPHILS # BLD AUTO: 2.84 K/UL — SIGNIFICANT CHANGE UP (ref 1.8–7.4)
NEUTROPHILS NFR BLD AUTO: 58.8 % — SIGNIFICANT CHANGE UP (ref 43–77)
NITRITE UR-MCNC: NEGATIVE — SIGNIFICANT CHANGE UP
NRBC # FLD: 0 — SIGNIFICANT CHANGE UP
PH UR: 6.5 — SIGNIFICANT CHANGE UP (ref 4.6–8)
PLATELET # BLD AUTO: 256 K/UL — SIGNIFICANT CHANGE UP (ref 150–400)
PMV BLD: 11.9 FL — SIGNIFICANT CHANGE UP (ref 7–13)
POTASSIUM SERPL-MCNC: 4 MMOL/L — SIGNIFICANT CHANGE UP (ref 3.5–5.3)
POTASSIUM SERPL-SCNC: 4 MMOL/L — SIGNIFICANT CHANGE UP (ref 3.5–5.3)
PROT SERPL-MCNC: 7.6 G/DL — SIGNIFICANT CHANGE UP (ref 6–8.3)
PROT UR-MCNC: 100 — HIGH
RBC # BLD: 5.33 M/UL — HIGH (ref 3.8–5.2)
RBC # FLD: 15.5 % — HIGH (ref 10.3–14.5)
RBC CASTS # UR COMP ASSIST: SIGNIFICANT CHANGE UP (ref 0–?)
SODIUM SERPL-SCNC: 141 MMOL/L — SIGNIFICANT CHANGE UP (ref 135–145)
SP GR SPEC: 1.03 — SIGNIFICANT CHANGE UP (ref 1–1.03)
SQUAMOUS # UR AUTO: SIGNIFICANT CHANGE UP
TRANS CELLS #/AREA URNS HPF: 1 — SIGNIFICANT CHANGE UP
UROBILINOGEN FLD QL: NORMAL E.U. — SIGNIFICANT CHANGE UP (ref 0.1–0.2)
WBC # BLD: 4.83 K/UL — SIGNIFICANT CHANGE UP (ref 3.8–10.5)
WBC # FLD AUTO: 4.83 K/UL — SIGNIFICANT CHANGE UP (ref 3.8–10.5)
WBC UR QL: SIGNIFICANT CHANGE UP (ref 0–?)

## 2017-11-17 PROCEDURE — 99284 EMERGENCY DEPT VISIT MOD MDM: CPT

## 2017-11-17 RX ORDER — MOXIFLOXACIN HYDROCHLORIDE TABLETS, 400 MG 400 MG/1
1 TABLET, FILM COATED ORAL
Qty: 6 | Refills: 0 | OUTPATIENT
Start: 2017-11-17 | End: 2017-11-20

## 2017-11-17 RX ORDER — ONDANSETRON 8 MG/1
4 TABLET, FILM COATED ORAL ONCE
Qty: 0 | Refills: 0 | Status: COMPLETED | OUTPATIENT
Start: 2017-11-17 | End: 2017-11-17

## 2017-11-17 RX ORDER — SODIUM CHLORIDE 9 MG/ML
1000 INJECTION INTRAMUSCULAR; INTRAVENOUS; SUBCUTANEOUS ONCE
Qty: 0 | Refills: 0 | Status: COMPLETED | OUTPATIENT
Start: 2017-11-17 | End: 2017-11-17

## 2017-11-17 RX ORDER — FAMOTIDINE 10 MG/ML
20 INJECTION INTRAVENOUS ONCE
Qty: 0 | Refills: 0 | Status: COMPLETED | OUTPATIENT
Start: 2017-11-17 | End: 2017-11-17

## 2017-11-17 RX ADMIN — SODIUM CHLORIDE 3000 MILLILITER(S): 9 INJECTION INTRAMUSCULAR; INTRAVENOUS; SUBCUTANEOUS at 17:24

## 2017-11-17 RX ADMIN — FAMOTIDINE 20 MILLIGRAM(S): 10 INJECTION INTRAVENOUS at 17:23

## 2017-11-17 RX ADMIN — ONDANSETRON 4 MILLIGRAM(S): 8 TABLET, FILM COATED ORAL at 17:23

## 2017-11-17 NOTE — ED PROVIDER NOTE - PMH
Asthma    Bipolar disorder, currently in remission    Depression    Hypoparathyroidism    Hypothyroid    Obesity    Pre-diabetes    PTSD (post-traumatic stress disorder)

## 2017-11-17 NOTE — ED PROVIDER NOTE - OBJECTIVE STATEMENT
Case of a 23 y/o female patient with past medical history of asthma, seizures hypothyroidism and depression presenting to the ED due to nausea, vomits and diarrhea that started about 3 days ago. Patient mentions some epigastric pain associated to it. Denies any fever, chills, burning at urination, vaginal secretionsor any other symptom.

## 2017-11-17 NOTE — ED ADULT TRIAGE NOTE - CHIEF COMPLAINT QUOTE
Brought in by ems from [facility] nausea and vomiting x 4 days. States she is unable to tolerate po intake. States " my stomach feels weak". PMH: asthma, seizures

## 2017-11-17 NOTE — ED PROVIDER NOTE - PROGRESS NOTE DETAILS
Patient reevaluated and feeling better, labs show UTI, she was recently on Abx but wasn't using abx accordingly, will discharge her on cipro, based on urine culture results from previous visit.

## 2017-11-17 NOTE — PROVIDER CONTACT NOTE (OTHER) - ASSESSMENT
Patient cleared for discharge and reports knowledge and ability to use public transport. [employer] card provided. No further questions or concerns reported.

## 2017-11-26 ENCOUNTER — EMERGENCY (EMERGENCY)
Facility: HOSPITAL | Age: 22
LOS: 1 days | Discharge: HOME | End: 2017-11-26
Admitting: EMERGENCY MEDICINE
Payer: MEDICAID

## 2017-11-26 VITALS
RESPIRATION RATE: 16 BRPM | TEMPERATURE: 98 F | SYSTOLIC BLOOD PRESSURE: 102 MMHG | HEART RATE: 89 BPM | DIASTOLIC BLOOD PRESSURE: 64 MMHG | OXYGEN SATURATION: 100 %

## 2017-11-26 VITALS
SYSTOLIC BLOOD PRESSURE: 110 MMHG | OXYGEN SATURATION: 100 % | HEART RATE: 99 BPM | DIASTOLIC BLOOD PRESSURE: 60 MMHG | RESPIRATION RATE: 16 BRPM | TEMPERATURE: 98 F

## 2017-11-26 LAB
ALBUMIN SERPL ELPH-MCNC: 4.8 G/DL — SIGNIFICANT CHANGE UP (ref 3.3–5)
ALP SERPL-CCNC: 142 U/L — HIGH (ref 40–120)
ALT FLD-CCNC: 32 U/L — SIGNIFICANT CHANGE UP (ref 4–33)
AMPHET UR-MCNC: NEGATIVE — SIGNIFICANT CHANGE UP
AMYLASE P1 CFR SERPL: 56 U/L — SIGNIFICANT CHANGE UP (ref 25–125)
APAP SERPL-MCNC: < 15 UG/ML — LOW (ref 15–25)
APPEARANCE UR: CLEAR — SIGNIFICANT CHANGE UP
AST SERPL-CCNC: 27 U/L — SIGNIFICANT CHANGE UP (ref 4–32)
BARBITURATES MEASUREMENT: NEGATIVE — SIGNIFICANT CHANGE UP
BARBITURATES UR SCN-MCNC: NEGATIVE — SIGNIFICANT CHANGE UP
BASOPHILS # BLD AUTO: 0.01 K/UL — SIGNIFICANT CHANGE UP (ref 0–0.2)
BASOPHILS NFR BLD AUTO: 0.1 % — SIGNIFICANT CHANGE UP (ref 0–2)
BENZODIAZ SERPL-MCNC: NEGATIVE — SIGNIFICANT CHANGE UP
BENZODIAZ UR-MCNC: NEGATIVE — SIGNIFICANT CHANGE UP
BILIRUB SERPL-MCNC: 0.6 MG/DL — SIGNIFICANT CHANGE UP (ref 0.2–1.2)
BILIRUB UR-MCNC: NEGATIVE — SIGNIFICANT CHANGE UP
BLOOD UR QL VISUAL: HIGH
BUN SERPL-MCNC: 6 MG/DL — LOW (ref 7–23)
CALCIUM SERPL-MCNC: 9.4 MG/DL — SIGNIFICANT CHANGE UP (ref 8.4–10.5)
CANNABINOIDS UR-MCNC: POSITIVE — SIGNIFICANT CHANGE UP
CHLORIDE SERPL-SCNC: 100 MMOL/L — SIGNIFICANT CHANGE UP (ref 98–107)
CO2 SERPL-SCNC: 18 MMOL/L — LOW (ref 22–31)
COCAINE METAB.OTHER UR-MCNC: NEGATIVE — SIGNIFICANT CHANGE UP
COLOR SPEC: YELLOW — SIGNIFICANT CHANGE UP
CREAT SERPL-MCNC: 0.9 MG/DL — SIGNIFICANT CHANGE UP (ref 0.5–1.3)
EOSINOPHIL # BLD AUTO: 0 K/UL — SIGNIFICANT CHANGE UP (ref 0–0.5)
EOSINOPHIL NFR BLD AUTO: 0 % — SIGNIFICANT CHANGE UP (ref 0–6)
ETHANOL BLD-MCNC: < 10 MG/DL — SIGNIFICANT CHANGE UP
GLUCOSE SERPL-MCNC: 130 MG/DL — HIGH (ref 70–99)
GLUCOSE UR-MCNC: NEGATIVE — SIGNIFICANT CHANGE UP
HCG SERPL-ACNC: < 5 MIU/ML — SIGNIFICANT CHANGE UP
HCT VFR BLD CALC: 43.1 % — SIGNIFICANT CHANGE UP (ref 34.5–45)
HGB BLD-MCNC: 13.7 G/DL — SIGNIFICANT CHANGE UP (ref 11.5–15.5)
IMM GRANULOCYTES # BLD AUTO: 0.01 # — SIGNIFICANT CHANGE UP
IMM GRANULOCYTES NFR BLD AUTO: 0.1 % — SIGNIFICANT CHANGE UP (ref 0–1.5)
KETONES UR-MCNC: NEGATIVE — SIGNIFICANT CHANGE UP
LEUKOCYTE ESTERASE UR-ACNC: NEGATIVE — SIGNIFICANT CHANGE UP
LIDOCAIN IGE QN: 12.8 U/L — SIGNIFICANT CHANGE UP (ref 7–60)
LITHIUM SERPL-MCNC: 0.05 MMOL/L — LOW (ref 0.6–1.2)
LYMPHOCYTES # BLD AUTO: 2.37 K/UL — SIGNIFICANT CHANGE UP (ref 1–3.3)
LYMPHOCYTES # BLD AUTO: 31.1 % — SIGNIFICANT CHANGE UP (ref 13–44)
MCHC RBC-ENTMCNC: 25 PG — LOW (ref 27–34)
MCHC RBC-ENTMCNC: 31.8 % — LOW (ref 32–36)
MCV RBC AUTO: 78.5 FL — LOW (ref 80–100)
METHADONE UR-MCNC: NEGATIVE — SIGNIFICANT CHANGE UP
MONOCYTES # BLD AUTO: 0.65 K/UL — SIGNIFICANT CHANGE UP (ref 0–0.9)
MONOCYTES NFR BLD AUTO: 8.5 % — SIGNIFICANT CHANGE UP (ref 2–14)
MUCOUS THREADS # UR AUTO: SIGNIFICANT CHANGE UP
NEUTROPHILS # BLD AUTO: 4.59 K/UL — SIGNIFICANT CHANGE UP (ref 1.8–7.4)
NEUTROPHILS NFR BLD AUTO: 60.2 % — SIGNIFICANT CHANGE UP (ref 43–77)
NITRITE UR-MCNC: NEGATIVE — SIGNIFICANT CHANGE UP
NRBC # FLD: 0 — SIGNIFICANT CHANGE UP
OPIATES UR-MCNC: NEGATIVE — SIGNIFICANT CHANGE UP
OXYCODONE UR-MCNC: NEGATIVE — SIGNIFICANT CHANGE UP
PCP UR-MCNC: NEGATIVE — SIGNIFICANT CHANGE UP
PH UR: 6 — SIGNIFICANT CHANGE UP (ref 4.6–8)
PLATELET # BLD AUTO: 273 K/UL — SIGNIFICANT CHANGE UP (ref 150–400)
PMV BLD: 11.9 FL — SIGNIFICANT CHANGE UP (ref 7–13)
POTASSIUM SERPL-MCNC: 3.4 MMOL/L — LOW (ref 3.5–5.3)
POTASSIUM SERPL-SCNC: 3.4 MMOL/L — LOW (ref 3.5–5.3)
PROT SERPL-MCNC: 8.4 G/DL — HIGH (ref 6–8.3)
PROT UR-MCNC: 30 — HIGH
RBC # BLD: 5.49 M/UL — HIGH (ref 3.8–5.2)
RBC # FLD: 15.3 % — HIGH (ref 10.3–14.5)
RBC CASTS # UR COMP ASSIST: HIGH (ref 0–?)
SALICYLATES SERPL-MCNC: < 5 MG/DL — LOW (ref 15–30)
SODIUM SERPL-SCNC: 142 MMOL/L — SIGNIFICANT CHANGE UP (ref 135–145)
SP GR SPEC: 1.01 — SIGNIFICANT CHANGE UP (ref 1–1.03)
SQUAMOUS # UR AUTO: SIGNIFICANT CHANGE UP
T3 SERPL-MCNC: 107 NG/DL — SIGNIFICANT CHANGE UP (ref 80–200)
T4 AB SER-ACNC: 8.72 UG/DL — SIGNIFICANT CHANGE UP (ref 5.1–13)
TSH SERPL-MCNC: 12.92 UIU/ML — HIGH (ref 0.27–4.2)
UROBILINOGEN FLD QL: NORMAL E.U. — SIGNIFICANT CHANGE UP (ref 0.1–0.2)
VALPROATE SERPL-MCNC: 10.4 UG/ML — LOW (ref 50–100)
WBC # BLD: 7.63 K/UL — SIGNIFICANT CHANGE UP (ref 3.8–10.5)
WBC # FLD AUTO: 7.63 K/UL — SIGNIFICANT CHANGE UP (ref 3.8–10.5)
WBC UR QL: HIGH (ref 0–?)

## 2017-11-26 PROCEDURE — 90792 PSYCH DIAG EVAL W/MED SRVCS: CPT

## 2017-11-26 PROCEDURE — 99285 EMERGENCY DEPT VISIT HI MDM: CPT | Mod: 25

## 2017-11-26 PROCEDURE — 93010 ELECTROCARDIOGRAM REPORT: CPT | Mod: 59

## 2017-11-26 RX ORDER — HALOPERIDOL DECANOATE 100 MG/ML
5 INJECTION INTRAMUSCULAR ONCE
Qty: 0 | Refills: 0 | Status: COMPLETED | OUTPATIENT
Start: 2017-11-26 | End: 2017-11-26

## 2017-11-26 RX ORDER — TETANUS TOXOID, REDUCED DIPHTHERIA TOXOID AND ACELLULAR PERTUSSIS VACCINE, ADSORBED 5; 2.5; 8; 8; 2.5 [IU]/.5ML; [IU]/.5ML; UG/.5ML; UG/.5ML; UG/.5ML
0.5 SUSPENSION INTRAMUSCULAR ONCE
Qty: 0 | Refills: 0 | Status: COMPLETED | OUTPATIENT
Start: 2017-11-26 | End: 2017-11-26

## 2017-11-26 RX ORDER — DIPHENHYDRAMINE HCL 50 MG
50 CAPSULE ORAL ONCE
Qty: 0 | Refills: 0 | Status: COMPLETED | OUTPATIENT
Start: 2017-11-26 | End: 2017-11-26

## 2017-11-26 RX ADMIN — HALOPERIDOL DECANOATE 5 MILLIGRAM(S): 100 INJECTION INTRAMUSCULAR at 15:44

## 2017-11-26 RX ADMIN — Medication 2 MILLIGRAM(S): at 15:45

## 2017-11-26 RX ADMIN — Medication 50 MILLIGRAM(S): at 15:44

## 2017-11-26 RX ADMIN — TETANUS TOXOID, REDUCED DIPHTHERIA TOXOID AND ACELLULAR PERTUSSIS VACCINE, ADSORBED 0.5 MILLILITER(S): 5; 2.5; 8; 8; 2.5 SUSPENSION INTRAMUSCULAR at 15:45

## 2017-11-26 NOTE — ED ADULT NURSE NOTE - OBJECTIVE STATEMENT
Received pt in  pt bought in by EMS from [facility] for agitation & self harming behaviour. pt  noted with multiple bilateral upper extremity lacerations superficial, pt medicated as per NP rx by RN toribio STEVENS on going.
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
## 2017-11-26 NOTE — ED BEHAVIORAL HEALTH ASSESSMENT NOTE - CURRENT MEDICATION
Patient currently has difficulty recalling her medications. Medication list obtained from previous ED note (7/31/17): Clozapine 200 mg PO QHS, Depakote  mg PO QHS, Benadryl 100mg PO QHS, isoniazid 300 mg PO daily, Synthroid 0.1mg PO daily and 0.075 mg PO daily, ferrous sulfate 325 mg PO QHS, pyridoxine 50 mg PO daily, ergocalciferol 5000 units PO once a week on Wednesday, multivitamin 1 tab PO daily, Colace 100 mg PO BID, Pepcid 20 mg PO BID, Proventil HFA inhale 2 puffs every 4-6 hours PRN SOB/wheezing, Tylenol 650 mg PO TID PRN pain, bismuth subsalicylate 262 mg 2 tablespoons PO TID PRN, petrolatum ointment 1 packet TID daily on the skin PRN. Patient currently has difficulty recalling her medications.  PO daily, Colace 100 mg PO BID, Pepcid 20 mg PO BID, Proventil HFA inhale 2 puffs every 4-6 hours PRN SOB/wheezing, Tylenol 650 mg PO TID PRN pain, bismuth subsalicylate 262 mg 2 tablespoons PO TID PRN, petrolatum ointment 1 packet TID daily on the skin PRN. Patient currently has difficulty recalling her medications.  From documented medication list as of Nov 17  Cipro 500 mg oral tablet: 1 tab(s) orally 2 times a day Keflex 500 mg oral capsule: 1 cap(s) orally 2 times a day cloZAPine 200 mg oral tablet: 1 tab(s) orally once a day (at bedtime) Patient denies taking this   Benadryl 50 mg oral capsule:  orally once a day (at bedtime), As Needed insomnia, Depakote 500 mg oral delayed release tablet: 1 tab(s) orally once a day, Colace: 200 milligram(s) orally 2 times a day  ferrous sulfate 325 mg (65 mg elemental iron) oral tablet: 1 tab(s) orally once a day, Atarax: 50 milligram(s) orally every 8 hours, As Needed allergies, isoniazid 300 mg oral tablet: 1 tab(s) orally once a day, levothyroxine 175 mcg (0.175 mg) oral tablet: 1 tab(s) orally once a day, multivitamin: 1 tab(s) orally once a day, nicotine 2 mg oral transmucosal gum: 1 tab(s) oral transmucosal every 6 hours, pyridoxine 50 mg oral tablet: 1 tab(s) orally once a day, senna 8.6 mg oral tablet: 1 tab(s) orally 2 times a day  thiamine 100 mg oral tablet: 1 tab(s) orally once a day, vitamin A & D topical cream: Apply topically to affected area 4 times a day
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
## 2017-11-26 NOTE — ED BEHAVIORAL HEALTH ASSESSMENT NOTE - SAFETY PLAN DETAILS
Please call [phone] if there is a risk of harm to self or others. Please attend to the nearest emergency department if yo have any worsening of mood

## 2017-11-26 NOTE — ED BEHAVIORAL HEALTH ASSESSMENT NOTE - REFERRAL / APPOINTMENT DETAILS
Patient has a scheduled appointment with her psychotherapist on 11/26/17 at 10am and verbalized that she will be attending this scheduled appointment

## 2017-11-26 NOTE — ED BEHAVIORAL HEALTH ASSESSMENT NOTE - PRIMARY DX
Bipolar affective disorder, depressed, in partial or unspecified remission Deferred condition on axis II

## 2017-11-26 NOTE — ED ADULT NURSE NOTE - CHIEF COMPLAINT QUOTE
Pt brought in by EMS from [facility] for superficial cuts to bilateral arms, after using glass to harm herself. Pt refusing to answer questions in triage and refusing vitals.

## 2017-11-26 NOTE — ED PROVIDER NOTE - OBJECTIVE STATEMENT
21 y/o F hx Asthma, Bipolar, Depression,  Hypoparathyroid, Obesity, PTSD BIBA 23 y/o F hx Asthma, Bipolar, Depression,  Hypoparathyroid, Obesity, PTSD BIBA agitated, aggressive, screaming and restrained in handcuffs. Medicated for agitation.  Evidence of multiple superficial cuts to b/l arms and upper  left arm. No evidence of deformities.     Will reassesses
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
## 2017-11-26 NOTE — ED BEHAVIORAL HEALTH ASSESSMENT NOTE - CASE SUMMARY
pt seen and examined. case discussed with FIDELINA Olmos. In summary this is Patient is a 23yo  woman, in a relationship, domiciled at [place] [address], taking classes to get her GED, with past psych history of bipolar disorder, cannabis use disorder, alcohol use disorder, r/o borderline personality disorder, 10 prior inpatient psychiatric hospitalizations, most recently at [facility] on 11/22/16 for suicidality, history of suicide attempts and cutting, with past medical history of hyperthyroidism, obesity, asthma, seizures, and gallstones, who was brought to [facility] ED by EMS after cutting her R/L forearms with a broken cologne bottle. On evaluation she reports having an episode of cutting today. Denies SI, intent or plan. endorses symptoms of chronic depression. engages in safety planning. In my medical opinion the pt is not an acute risk of harm to self or others and does not warrant a psychiatric hospitalization. plan as per above.

## 2017-11-26 NOTE — ED BEHAVIORAL HEALTH ASSESSMENT NOTE - DESCRIPTION
Patient is calm, pleasant, cooperative, no acute psychomotor abnormalities, no PRNs required. Obesity, asthma, seizures, gallstones, hypothyroidism. Patient has lived in foster care since the age of 7. She was in different foster homes until age 19 when she was placed in [facility] supportive housing.  [place] [address] [place] since May, [place]. Patient dropped out of school when she was in 10th grade. She is currently trying to get her GED. Has a boyfriend. Her father is in long-term. Initially agitated, screaming, requiring substantial support from staff and received IM injections for acute agitation around 1530. Around 2100, patient was noted to be tired, calm and cooperative     Vital Signs Last 24 Hrs  T(C): 36.7 (26 Nov 2017 17:51), Max: 36.7 (26 Nov 2017 17:51)  T(F): 98.1 (26 Nov 2017 17:51), Max: 98.1 (26 Nov 2017 17:51)  HR: 89 (26 Nov 2017 17:51) (89 - 99)  BP: 102/64 (26 Nov 2017 17:51) (102/64 - 110/60)  BP(mean): --  ABP: --  ABP(mean): --  RR: 16 (26 Nov 2017 17:51) (16 - 16)  SpO2: 100% (26 Nov 2017 17:51) (100% - 100%)

## 2017-11-26 NOTE — ED BEHAVIORAL HEALTH ASSESSMENT NOTE - DESCRIPTION (FIRST USE, LAST USE, QUANTITY, FREQUENCY, DURATION)
There is a noted history of alcohol use disorder. She last drank last Friday. Drank a whole bottle of vodka and had to go to the ER at [place]. Noted history of cannabis use disorder. Smokes 10-20 blunts per day. Last smoked yesterday. There is a noted history of alcohol use disorder. Denies any recent use of alcohol First use at the age of 13, noted history of cannabis use disorder. Smokes 10-20 blunts per day. Last smoked this morning (a couple of blunts).

## 2017-11-26 NOTE — ED PROVIDER NOTE - PROGRESS NOTE DETAILS
Oliva NP- Patient  AX03, vitals  stable, no acte distress. Denies SI/HI/AH/VH. Denies recent use of alcohol or illicit drugs. Medical evaluation performed. There is no clinical evidence of intoxication or any acute medical problem requiring immediate intervention. Patient is awaiting psychiatric consultation. Final disposition will be determined by psychiatrist.

## 2017-11-26 NOTE — ED BEHAVIORAL HEALTH NOTE - BEHAVIORAL HEALTH NOTE
Patient arrived via police with evidence of self-injury, screaming, not communicating, agitated. Given evidence of self-injury and lack of communication, agitated state, will place on constant observation.

## 2017-11-26 NOTE — ED BEHAVIORAL HEALTH ASSESSMENT NOTE - OTHER PAST PSYCHIATRIC HISTORY (INCLUDE DETAILS REGARDING ONSET, COURSE OF ILLNESS, INPATIENT/OUTPATIENT TREATMENT)
Patient has a prior diagnosis of bipolar disorder, alcohol use disorder, cannabis use disorder, and rule out borderline personality disorder. She has a history of 10 prior inpatient psychiatric hospitalizations, most recently at [facility] in 11/2016 for suicidality.  Attends [employer] and [facility], Psychiatrist is Dr. Sotuh Schwartz ([phone]), Therapist is Ms. Joselyn George ([phone]. Patient has a prior diagnosis of bipolar disorder, alcohol use disorder, cannabis use disorder, and rule out borderline personality disorder. She has a history of 10 prior inpatient psychiatric hospitalizations, most recently at [facility] in 11/2016 for suicidality.  Attends [employer] and [employer] [facility], Psychiatrist is Dr. South Schwartz ([phone]), Therapist is Ms. Joselyn George ([phone]. (As per psych evaluation in [facility])    Today, patient reports that her psychiatrist is Dr. Duke and her therapist is Dr. Galeano in which she reports that she has a scheduled appointment 11/26/17. Reports that she last saw Dr. Galeano on friday 11/24/17

## 2017-11-26 NOTE — ED BEHAVIORAL HEALTH ASSESSMENT NOTE - OTHER
Rupa Gruber -  at [phone], [address] roommate Taking classes for GED. Unable to assess; was lying in bed Rupa Gruber -  at [phone], [address] [facility]/ Skip Barron LPN

## 2017-11-26 NOTE — ED BEHAVIORAL HEALTH ASSESSMENT NOTE - SUMMARY
Patient is a 23yo  woman, in a relationship, domiciled at [place] [address], taking classes to get her GED, with past psych history of bipolar disorder, cannabis use disorder, alcohol use disorder, r/o borderline personality disorder, 10 prior inpatient psychiatric hospitalizations, most recently at [facility] on 11/22/16 for suicidality, history of suicide attempts and cutting, with past medical history of hyperthyroidism, obesity, asthma, seizures, and gallstones, who was brought to [facility] ED by EMS after cutting her R/L forearms with a broken cologne bottle.    Patient was calm and cooperative during the evaluation. She was also pleasant and cooperative with the ED staff. She states having depressed mood and having low energy. She feels guilt, blaming "[herself] for everything" including cutting. She has trouble sleeping at night but sleeps through the morning. Though she was cutting to "forget [her] pain," she denies suicidal ideation, plan, or intent, and denies homicidal ideation. She also denies AH, VH, paranoia, and symptoms of parish. She feels hopeful and motivated about the future and looks forward to finishing her GED and studying to become a midwife nurse. Her recent TSH level in the ED is elevated at 223.30. The writer counseled the patient on taking her medications (including her Synthroid) as prescribed. The patient was open to the recommendations and agreed. Patient does not present as an acute risk to self or others at this time, and does not meet criteria for psychiatric hospitalization. Patient is a 23yo  woman, in a relationship, domiciled at [place] [id number], taking classes to get her GED, with past psych history of bipolar disorder, cannabis use disorder, alcohol use disorder, r/o borderline personality disorder, 10 prior inpatient psychiatric hospitalizations, most recently at [facility] on 11/22/16 for suicidality, history of suicide attempts and cutting, with past medical history of hyperthyroidism, obesity, asthma, seizures, and gallstones, who was brought to [facility] ED by EMS after cutting her R/L forearms with a broken cologne bottle.    Patient was initially seen agitated and required IM injections as she reported that she was triggered by male staff while in a stretcher (possibly related to PTSD). She is seen by writer after six hours as calm, pleasant and a good historian. She does verbalize that she has poor coping skills and  and reports that she toni by smoking "16 blunts a day and cutting." It appears that patient has a chronic history of self-injurious behaviors. Patient does not present as an acute risk to self or others at this time, and does not meet criteria for psychiatric hospitalization. Patient is a 21yo  woman, in a relationship, domiciled at [place] [address], taking classes to get her GED, with past psych history of bipolar disorder, cannabis use disorder, alcohol use disorder, r/o borderline personality disorder, 10 prior inpatient psychiatric hospitalizations, most recently at [facility] on 11/22/16 for suicidality, history of suicide attempts and cutting, with past medical history of hyperthyroidism, obesity, asthma, seizures, and gallstones, who was brought to [facility] ED by EMS after cutting her R/L forearms with a broken cologne bottle.    Patient presents with similar presentation as last ED visit in September besides that need for an intramuscular injection.     Patient was initially seen agitated and required IM injections as she reported that she was triggered by male staff while in a stretcher (possibly related to PTSD). She is seen by writer after six hours as calm, pleasant and a good historian. She does verbalize that she has poor coping skills and reports that she toni by smoking "16 blunts a day and cutting." It appears that patient has a chronic history of self-injurious behaviors. Patient does not present as an acute risk to self or others at this time, and does not meet criteria for psychiatric hospitalization.

## 2017-11-26 NOTE — ED PROVIDER NOTE - MEDICAL DECISION MAKING DETAILS
21 y/o F hx Asthma, Bipolar, Depression,  Hypoparathyroid, Obesity, PTSD 23 y/o F hx Asthma, Bipolar, Depression,  Hypoparathyroid, Obesity, PTSD  Labs, Urine Tox/UA, EKG, HCG. Evidence of multiple superficial cuts to b/l arms and upper  left arm, no indication for suturing. Tetanus updated. Bacitracin applied to area.     Medical evaluation performed. There is no clinical evidence of intoxication or any acute medical problem requiring immediate intervention. Patient is awaiting psychiatric consultation. Final disposition will be determined by psychiatrist.
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
## 2017-11-28 DIAGNOSIS — R69 ILLNESS, UNSPECIFIED: ICD-10-CM

## 2017-11-29 ENCOUNTER — EMERGENCY (EMERGENCY)
Facility: HOSPITAL | Age: 22
LOS: 1 days | Discharge: HOME | End: 2017-11-29
Admitting: EMERGENCY MEDICINE
Payer: MEDICAID

## 2017-11-29 VITALS
RESPIRATION RATE: 20 BRPM | TEMPERATURE: 98 F | SYSTOLIC BLOOD PRESSURE: 143 MMHG | DIASTOLIC BLOOD PRESSURE: 86 MMHG | OXYGEN SATURATION: 99 % | HEART RATE: 96 BPM

## 2017-11-29 LAB
ALBUMIN SERPL ELPH-MCNC: 4.3 G/DL — SIGNIFICANT CHANGE UP (ref 3.3–5)
ALP SERPL-CCNC: 141 U/L — HIGH (ref 40–120)
ALT FLD-CCNC: 27 U/L — SIGNIFICANT CHANGE UP (ref 4–33)
AST SERPL-CCNC: 28 U/L — SIGNIFICANT CHANGE UP (ref 4–32)
BASOPHILS # BLD AUTO: 0.03 K/UL — SIGNIFICANT CHANGE UP (ref 0–0.2)
BASOPHILS NFR BLD AUTO: 0.5 % — SIGNIFICANT CHANGE UP (ref 0–2)
BILIRUB SERPL-MCNC: 0.8 MG/DL — SIGNIFICANT CHANGE UP (ref 0.2–1.2)
BUN SERPL-MCNC: 7 MG/DL — SIGNIFICANT CHANGE UP (ref 7–23)
CALCIUM SERPL-MCNC: 8.8 MG/DL — SIGNIFICANT CHANGE UP (ref 8.4–10.5)
CHLORIDE SERPL-SCNC: 105 MMOL/L — SIGNIFICANT CHANGE UP (ref 98–107)
CO2 SERPL-SCNC: 24 MMOL/L — SIGNIFICANT CHANGE UP (ref 22–31)
CREAT SERPL-MCNC: 0.81 MG/DL — SIGNIFICANT CHANGE UP (ref 0.5–1.3)
EOSINOPHIL # BLD AUTO: 0 K/UL — SIGNIFICANT CHANGE UP (ref 0–0.5)
EOSINOPHIL NFR BLD AUTO: 0 % — SIGNIFICANT CHANGE UP (ref 0–6)
GLUCOSE SERPL-MCNC: 96 MG/DL — SIGNIFICANT CHANGE UP (ref 70–99)
HCT VFR BLD CALC: 43.3 % — SIGNIFICANT CHANGE UP (ref 34.5–45)
HGB BLD-MCNC: 13.4 G/DL — SIGNIFICANT CHANGE UP (ref 11.5–15.5)
IMM GRANULOCYTES # BLD AUTO: 0.02 # — SIGNIFICANT CHANGE UP
IMM GRANULOCYTES NFR BLD AUTO: 0.3 % — SIGNIFICANT CHANGE UP (ref 0–1.5)
LYMPHOCYTES # BLD AUTO: 1.83 K/UL — SIGNIFICANT CHANGE UP (ref 1–3.3)
LYMPHOCYTES # BLD AUTO: 28.2 % — SIGNIFICANT CHANGE UP (ref 13–44)
MCHC RBC-ENTMCNC: 24.8 PG — LOW (ref 27–34)
MCHC RBC-ENTMCNC: 30.9 % — LOW (ref 32–36)
MCV RBC AUTO: 80 FL — SIGNIFICANT CHANGE UP (ref 80–100)
MONOCYTES # BLD AUTO: 0.48 K/UL — SIGNIFICANT CHANGE UP (ref 0–0.9)
MONOCYTES NFR BLD AUTO: 7.4 % — SIGNIFICANT CHANGE UP (ref 2–14)
NEUTROPHILS # BLD AUTO: 4.13 K/UL — SIGNIFICANT CHANGE UP (ref 1.8–7.4)
NEUTROPHILS NFR BLD AUTO: 63.6 % — SIGNIFICANT CHANGE UP (ref 43–77)
NRBC # FLD: 0 — SIGNIFICANT CHANGE UP
PLATELET # BLD AUTO: 258 K/UL — SIGNIFICANT CHANGE UP (ref 150–400)
PMV BLD: 11.6 FL — SIGNIFICANT CHANGE UP (ref 7–13)
POTASSIUM SERPL-MCNC: 3.9 MMOL/L — SIGNIFICANT CHANGE UP (ref 3.5–5.3)
POTASSIUM SERPL-SCNC: 3.9 MMOL/L — SIGNIFICANT CHANGE UP (ref 3.5–5.3)
PROT SERPL-MCNC: 7.7 G/DL — SIGNIFICANT CHANGE UP (ref 6–8.3)
RBC # BLD: 5.41 M/UL — HIGH (ref 3.8–5.2)
RBC # FLD: 15.4 % — HIGH (ref 10.3–14.5)
SODIUM SERPL-SCNC: 145 MMOL/L — SIGNIFICANT CHANGE UP (ref 135–145)
WBC # BLD: 6.49 K/UL — SIGNIFICANT CHANGE UP (ref 3.8–10.5)
WBC # FLD AUTO: 6.49 K/UL — SIGNIFICANT CHANGE UP (ref 3.8–10.5)

## 2017-11-29 PROCEDURE — 99284 EMERGENCY DEPT VISIT MOD MDM: CPT

## 2017-11-29 RX ORDER — SODIUM CHLORIDE 9 MG/ML
1000 INJECTION INTRAMUSCULAR; INTRAVENOUS; SUBCUTANEOUS ONCE
Qty: 0 | Refills: 0 | Status: COMPLETED | OUTPATIENT
Start: 2017-11-29 | End: 2017-11-29

## 2017-11-29 RX ADMIN — SODIUM CHLORIDE 2000 MILLILITER(S): 9 INJECTION INTRAMUSCULAR; INTRAVENOUS; SUBCUTANEOUS at 18:41

## 2017-11-29 NOTE — ED PROVIDER NOTE - CHPI ED SYMPTOMS POS
NAUSEA/ABDOMINAL DISTENSION/worsening (deep breathing) abd pain/DIARRHEA/PAIN/VOMITING "cramping"/NAUSEA/VOMITING/DIARRHEA

## 2017-11-29 NOTE — ED PROVIDER NOTE - PLAN OF CARE
Follow up with your PMD within 48-72 hrs. Show copies of your reports given to you. I recommend that you have your urine re-tested for infection and your stool tested for a culture and C. diff if your diarrhea continues. Take all of your medications as previously prescribed. Rest, increase your fluids. [place] diet- advance as tolerated. Worsening, continued or new concerning symptoms return to the emergency department.

## 2017-11-29 NOTE — ED PROVIDER NOTE - MEDICAL DECISION MAKING DETAILS
23 y/o F pt presenting with nausea, vomiting in the morning, proceeded with diarrhea (x3 per day) for 1-2 weeks. Abd virus spreading at [facility]. Plan; basic labs, fluids, attempt to obtain a stool CNS, a C-diff and PMD f/u 21 y/o F pt presenting with vomiting x1 daily in am proceeded with diarrhea (x3 per day) for 1-2 weeks. Abd virus spreading at [facility]. just completed Cipro for UTI Plan; basic labs, fluids, attempt to obtain a stool CNS, a C-diff, UCG, UA, Urine Culture, PMD f/u 23 y/o F pt presenting with vomiting x1 daily in am proceeded with diarrhea (x3 per day) for 1-2 weeks. GI virus going around at [place]. just completed Cipro for UTI Plan; basic labs, fluids, attempt to obtain stool C&S, C-diff, UCG, UA, Urine Culture, PMD f/u
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
## 2017-11-29 NOTE — ED PROVIDER NOTE - PMH
Asthma    Bipolar disorder, currently in remission    Depression    Hypoparathyroidism    Hypothyroid    Obesity    Pre-diabetes    PTSD (post-traumatic stress disorder)    Seizures

## 2017-11-29 NOTE — ED ADULT TRIAGE NOTE - CHIEF COMPLAINT QUOTE
c/o abdominal pain c/o vomiting diarrhea c/o losing weight pt evaluated in ER for same symptoms two weeks ago followed up with pmd on Monday states "I am waiting for an appointment with an gastroenterologist I am still vomiting and have diarrhea"

## 2017-11-29 NOTE — ED PROVIDER NOTE - PROGRESS NOTE DETAILS
LAITH Johnson- pt received 1 liter fluids. Requesting to be DC'ed. Denies abdominal pain or diarrhea at this time. Unable to leave stool sample. CBC and CMP stable. Pt agrees to follow up with PMD for UA, Urine Culture and further stool analysis.

## 2017-11-29 NOTE — ED PROVIDER NOTE - CARE PLAN
Principal Discharge DX:	Diarrhea, unspecified type  Instructions for follow-up, activity and diet:	Follow up with your PMD within 48-72 hrs. Show copies of your reports given to you. I recommend that you have your urine re-tested for infection and your stool tested for a culture and C. diff if your diarrhea continues. Take all of your medications as previously prescribed. Rest, increase your fluids. [place] diet- advance as tolerated. Worsening, continued or new concerning symptoms return to the emergency department.

## 2017-12-01 LAB
BACTERIA UR CULT: SIGNIFICANT CHANGE UP
SPECIMEN SOURCE: SIGNIFICANT CHANGE UP

## 2017-12-02 ENCOUNTER — EMERGENCY (EMERGENCY)
Facility: HOSPITAL | Age: 22
LOS: 1 days | Discharge: HOME | End: 2017-12-02
Admitting: EMERGENCY MEDICINE
Payer: MEDICAID

## 2017-12-02 VITALS
RESPIRATION RATE: 16 BRPM | HEART RATE: 100 BPM | SYSTOLIC BLOOD PRESSURE: 136 MMHG | DIASTOLIC BLOOD PRESSURE: 69 MMHG | TEMPERATURE: 99 F | OXYGEN SATURATION: 100 %

## 2017-12-02 PROCEDURE — 70486 CT MAXILLOFACIAL W/O DYE: CPT | Mod: 26

## 2017-12-02 PROCEDURE — 99284 EMERGENCY DEPT VISIT MOD MDM: CPT

## 2017-12-02 PROCEDURE — 70450 CT HEAD/BRAIN W/O DYE: CPT | Mod: 26

## 2017-12-02 RX ORDER — ACETAMINOPHEN 500 MG
650 TABLET ORAL ONCE
Qty: 0 | Refills: 0 | Status: COMPLETED | OUTPATIENT
Start: 2017-12-02 | End: 2017-12-02

## 2017-12-02 RX ADMIN — Medication 650 MILLIGRAM(S): at 21:18
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
## 2017-12-02 NOTE — ED POST DISCHARGE NOTE - OTHER COMMUNICATION
[phone] - Pt denies fever, chills, dysuria or hematuria.  Pt currently in [facility].  Discussed results with Nurse Silvestre.  Recommend repeat  UA/UCX.  Faxed results to [facility] [phone].  Fax confirmation sheet received.

## 2017-12-05 ENCOUNTER — TRANSCRIPTION ENCOUNTER (OUTPATIENT)
Age: 22
End: 2017-12-05

## 2018-01-17 ENCOUNTER — EMERGENCY (EMERGENCY)
Facility: HOSPITAL | Age: 23
LOS: 1 days | Discharge: HOME | End: 2018-01-17
Attending: EMERGENCY MEDICINE | Admitting: EMERGENCY MEDICINE
Payer: MEDICAID

## 2018-01-17 VITALS
RESPIRATION RATE: 17 BRPM | DIASTOLIC BLOOD PRESSURE: 62 MMHG | OXYGEN SATURATION: 100 % | SYSTOLIC BLOOD PRESSURE: 94 MMHG | HEART RATE: 73 BPM | TEMPERATURE: 98 F

## 2018-01-17 VITALS
TEMPERATURE: 99 F | DIASTOLIC BLOOD PRESSURE: 102 MMHG | OXYGEN SATURATION: 100 % | SYSTOLIC BLOOD PRESSURE: 148 MMHG | RESPIRATION RATE: 15 BRPM | HEART RATE: 75 BPM

## 2018-01-17 LAB
ALBUMIN SERPL ELPH-MCNC: 3.9 G/DL — SIGNIFICANT CHANGE UP (ref 3.3–5)
ALP SERPL-CCNC: 115 U/L — SIGNIFICANT CHANGE UP (ref 40–120)
ALT FLD-CCNC: 13 U/L — SIGNIFICANT CHANGE UP (ref 4–33)
AST SERPL-CCNC: 13 U/L — SIGNIFICANT CHANGE UP (ref 4–32)
BASOPHILS # BLD AUTO: 0.03 K/UL — SIGNIFICANT CHANGE UP (ref 0–0.2)
BASOPHILS NFR BLD AUTO: 0.4 % — SIGNIFICANT CHANGE UP (ref 0–2)
BILIRUB SERPL-MCNC: 0.4 MG/DL — SIGNIFICANT CHANGE UP (ref 0.2–1.2)
BUN SERPL-MCNC: 7 MG/DL — SIGNIFICANT CHANGE UP (ref 7–23)
CALCIUM SERPL-MCNC: 8.8 MG/DL — SIGNIFICANT CHANGE UP (ref 8.4–10.5)
CHLORIDE SERPL-SCNC: 102 MMOL/L — SIGNIFICANT CHANGE UP (ref 98–107)
CO2 SERPL-SCNC: 23 MMOL/L — SIGNIFICANT CHANGE UP (ref 22–31)
CREAT SERPL-MCNC: 0.69 MG/DL — SIGNIFICANT CHANGE UP (ref 0.5–1.3)
EOSINOPHIL # BLD AUTO: 0 K/UL — SIGNIFICANT CHANGE UP (ref 0–0.5)
EOSINOPHIL NFR BLD AUTO: 0 % — SIGNIFICANT CHANGE UP (ref 0–6)
GLUCOSE SERPL-MCNC: 92 MG/DL — SIGNIFICANT CHANGE UP (ref 70–99)
HCG SERPL-ACNC: < 5 MIU/ML — SIGNIFICANT CHANGE UP
HCT VFR BLD CALC: 39.9 % — SIGNIFICANT CHANGE UP (ref 34.5–45)
HGB BLD-MCNC: 12.3 G/DL — SIGNIFICANT CHANGE UP (ref 11.5–15.5)
IMM GRANULOCYTES # BLD AUTO: 0.02 # — SIGNIFICANT CHANGE UP
IMM GRANULOCYTES NFR BLD AUTO: 0.3 % — SIGNIFICANT CHANGE UP (ref 0–1.5)
LIDOCAIN IGE QN: 20.9 U/L — SIGNIFICANT CHANGE UP (ref 7–60)
LYMPHOCYTES # BLD AUTO: 4.15 K/UL — HIGH (ref 1–3.3)
LYMPHOCYTES # BLD AUTO: 53.4 % — HIGH (ref 13–44)
MCHC RBC-ENTMCNC: 24.5 PG — LOW (ref 27–34)
MCHC RBC-ENTMCNC: 30.8 % — LOW (ref 32–36)
MCV RBC AUTO: 79.3 FL — LOW (ref 80–100)
MONOCYTES # BLD AUTO: 0.61 K/UL — SIGNIFICANT CHANGE UP (ref 0–0.9)
MONOCYTES NFR BLD AUTO: 7.9 % — SIGNIFICANT CHANGE UP (ref 2–14)
NEUTROPHILS # BLD AUTO: 2.96 K/UL — SIGNIFICANT CHANGE UP (ref 1.8–7.4)
NEUTROPHILS NFR BLD AUTO: 38 % — LOW (ref 43–77)
NRBC # FLD: 0 — SIGNIFICANT CHANGE UP
PLATELET # BLD AUTO: 319 K/UL — SIGNIFICANT CHANGE UP (ref 150–400)
PMV BLD: 11 FL — SIGNIFICANT CHANGE UP (ref 7–13)
POTASSIUM SERPL-MCNC: 3.9 MMOL/L — SIGNIFICANT CHANGE UP (ref 3.5–5.3)
POTASSIUM SERPL-SCNC: 3.9 MMOL/L — SIGNIFICANT CHANGE UP (ref 3.5–5.3)
PROT SERPL-MCNC: 7.3 G/DL — SIGNIFICANT CHANGE UP (ref 6–8.3)
RBC # BLD: 5.03 M/UL — SIGNIFICANT CHANGE UP (ref 3.8–5.2)
RBC # FLD: 14.8 % — HIGH (ref 10.3–14.5)
SODIUM SERPL-SCNC: 140 MMOL/L — SIGNIFICANT CHANGE UP (ref 135–145)
WBC # BLD: 7.77 K/UL — SIGNIFICANT CHANGE UP (ref 3.8–10.5)
WBC # FLD AUTO: 7.77 K/UL — SIGNIFICANT CHANGE UP (ref 3.8–10.5)

## 2018-01-17 PROCEDURE — 99285 EMERGENCY DEPT VISIT HI MDM: CPT | Mod: 25

## 2018-01-17 RX ORDER — SODIUM CHLORIDE 9 MG/ML
1000 INJECTION INTRAMUSCULAR; INTRAVENOUS; SUBCUTANEOUS ONCE
Qty: 0 | Refills: 0 | Status: COMPLETED | OUTPATIENT
Start: 2018-01-17 | End: 2018-01-17

## 2018-01-17 RX ORDER — FAMOTIDINE 10 MG/ML
20 INJECTION INTRAVENOUS ONCE
Qty: 0 | Refills: 0 | Status: COMPLETED | OUTPATIENT
Start: 2018-01-17 | End: 2018-01-17

## 2018-01-17 RX ADMIN — FAMOTIDINE 20 MILLIGRAM(S): 10 INJECTION INTRAVENOUS at 05:50

## 2018-01-17 RX ADMIN — Medication 30 MILLILITER(S): at 05:50

## 2018-01-17 RX ADMIN — SODIUM CHLORIDE 1000 MILLILITER(S): 9 INJECTION INTRAMUSCULAR; INTRAVENOUS; SUBCUTANEOUS at 05:50

## 2018-01-17 NOTE — ED PROVIDER NOTE - OBJECTIVE STATEMENT
22F h/o bipolar, hypothyroid p/w epigastric pain x 1 week a/w vomiting when eating solids. Has had this pain chronically, seen 5 doctors this month, is supposed to see a GI dr. +weakness. Denies fever, diarrhea, black/bloody stool (unlike triage, she had an episode of dark stool after pepto bismol, no BRBPR), dysuria, hematuria, travel, sick contacts.

## 2018-01-17 NOTE — ED PROVIDER NOTE - PLAN OF CARE
See your primary care doctor within 24-48 hours, bring copies of all reports with you. Do not eat any fried/oily/greasy foods, tomatoes, citrus, and dairy because they can irritate your stomach. Rest, drink plenty of fluids. Return to the ER for worsening symptoms or any other concerns.

## 2018-01-17 NOTE — ED PROVIDER NOTE - MEDICAL DECISION MAKING DETAILS
Abdominal pain most consistent with gastritis/PUD  1) LABS/UCG/IVF  2) Pain control and anti-emetics as needed  3) Reassess/po challenge once improved

## 2018-01-17 NOTE — ED ADULT NURSE NOTE - OBJECTIVE STATEMENT
Patient c/o n/v x 3 today and abdominal pain x 1 week. Denies any other complaints, no diarrhea or constipation currently. Family at bedside, vs as noted.

## 2018-01-17 NOTE — ED PROVIDER NOTE - CARE PLAN
Principal Discharge DX:	Epigastric pain Principal Discharge DX:	Epigastric pain  Assessment and plan of treatment:	See your primary care doctor within 24-48 hours, bring copies of all reports with you. Do not eat any fried/oily/greasy foods, tomatoes, citrus, and dairy because they can irritate your stomach. Rest, drink plenty of fluids. Return to the ER for worsening symptoms or any other concerns.

## 2018-01-17 NOTE — ED PROVIDER NOTE - ATTENDING CONTRIBUTION TO CARE
DR. GUERRERO, ATTENDING MD-  I performed a face to face bedside interview with patient regarding history of present illness, review of symptoms and past medical history. I completed an independent physical exam.  I have discussed patient's plan of care with the resident.   Documentation as above in the note.   HPI: 22F h/o bipolar, hypothyroid p/w epigastric pain x 1 week a/w vomiting when eating solids. Has had this pain chronically, seen 5 doctors this month, is supposed to see a GI dr. +weakness. Denies fever, diarrhea, black/bloody stool (unlike triage, she had an episode of dark stool after pepto bismol, no BRBPR), dysuria, hematuria, denies vag bleeding or discharge, travel, sick contacts.  EXAM: Well appearing, NAD, sleeping but arousable. Abd soft nontender, no rebound, no gaurding. NEG murphys, NEG mcburneys.   MDM: Concern for GERD, unlikely GB or other etiology. Pt has had similar symptoms in past and work up has been neg to date. Will obtain labs, provide meds, and reassess. Pt had 4 CT abd in the last 8 months. Will hold imaging unless symptoms do not improve.

## 2018-01-17 NOTE — ED PROVIDER NOTE - PROGRESS NOTE DETAILS
LAITH Lopez: Received sign out from Dr. Maya to reassess pt and dc if labs are wnl and pt is able to tolerate PO. Pt reassessed, feels better after IV fluids, abdomen soft no guarding, eating applesauce and jello w/o issue. Will dc w/ GI follow up

## 2018-01-17 NOTE — ED ADULT TRIAGE NOTE - CHIEF COMPLAINT QUOTE
Pt c/o abdominal pain/N/V/D for approx 1 week with small amount of blood in stool.  Pt endorses chills.  Denies any urinary symptoms.  LMP 1/12/18.  PMHx:  depression, UTI, seizures, hypothyroid, PTSD

## 2018-01-31 ENCOUNTER — EMERGENCY (EMERGENCY)
Facility: HOSPITAL | Age: 23
LOS: 1 days | Discharge: HOME | End: 2018-01-31
Attending: EMERGENCY MEDICINE | Admitting: EMERGENCY MEDICINE
Payer: MEDICAID

## 2018-01-31 VITALS
SYSTOLIC BLOOD PRESSURE: 120 MMHG | RESPIRATION RATE: 16 BRPM | OXYGEN SATURATION: 98 % | TEMPERATURE: 99 F | DIASTOLIC BLOOD PRESSURE: 78 MMHG | HEART RATE: 88 BPM

## 2018-01-31 VITALS
DIASTOLIC BLOOD PRESSURE: 73 MMHG | OXYGEN SATURATION: 100 % | HEART RATE: 75 BPM | RESPIRATION RATE: 16 BRPM | SYSTOLIC BLOOD PRESSURE: 108 MMHG

## 2018-01-31 PROCEDURE — 71046 X-RAY EXAM CHEST 2 VIEWS: CPT | Mod: 26

## 2018-01-31 PROCEDURE — 99283 EMERGENCY DEPT VISIT LOW MDM: CPT

## 2018-01-31 RX ORDER — ACETAMINOPHEN 500 MG
650 TABLET ORAL ONCE
Qty: 0 | Refills: 0 | Status: COMPLETED | OUTPATIENT
Start: 2018-01-31 | End: 2018-01-31

## 2018-01-31 RX ORDER — IBUPROFEN 200 MG
600 TABLET ORAL ONCE
Qty: 0 | Refills: 0 | Status: COMPLETED | OUTPATIENT
Start: 2018-01-31 | End: 2018-01-31

## 2018-01-31 RX ORDER — PSEUDOEPHEDRINE HCL 30 MG
30 TABLET ORAL ONCE
Qty: 0 | Refills: 0 | Status: COMPLETED | OUTPATIENT
Start: 2018-01-31 | End: 2018-01-31

## 2018-01-31 RX ADMIN — Medication 650 MILLIGRAM(S): at 19:10

## 2018-01-31 RX ADMIN — Medication 600 MILLIGRAM(S): at 19:10

## 2018-01-31 RX ADMIN — Medication 30 MILLIGRAM(S): at 19:10

## 2018-01-31 NOTE — ED PROVIDER NOTE - OBJECTIVE STATEMENT
22 F complaining of 1 week of off/on headache, congestion, cough.  Denies fever/chills.  States that multiple people where she lives have viral like symptoms.  Patient has not taken any medication.  Patient states that she is also complaining of stomach pain, unchanged from prior ED visit, no vomiting, no nausea.

## 2018-03-26 ENCOUNTER — EMERGENCY (EMERGENCY)
Facility: HOSPITAL | Age: 23
LOS: 1 days | Discharge: HOME | End: 2018-03-26
Attending: EMERGENCY MEDICINE | Admitting: EMERGENCY MEDICINE
Payer: MEDICAID

## 2018-03-26 VITALS
OXYGEN SATURATION: 100 % | TEMPERATURE: 99 F | RESPIRATION RATE: 18 BRPM | SYSTOLIC BLOOD PRESSURE: 124 MMHG | DIASTOLIC BLOOD PRESSURE: 69 MMHG | HEART RATE: 100 BPM

## 2018-03-26 VITALS
OXYGEN SATURATION: 100 % | SYSTOLIC BLOOD PRESSURE: 114 MMHG | DIASTOLIC BLOOD PRESSURE: 75 MMHG | HEIGHT: 59 IN | WEIGHT: 184.97 LBS | HEART RATE: 91 BPM | RESPIRATION RATE: 18 BRPM | TEMPERATURE: 98 F

## 2018-03-26 LAB
ALBUMIN SERPL ELPH-MCNC: 4.1 G/DL — SIGNIFICANT CHANGE UP (ref 3.3–5)
ALP SERPL-CCNC: 105 U/L — SIGNIFICANT CHANGE UP (ref 40–120)
ALT FLD-CCNC: 18 U/L — SIGNIFICANT CHANGE UP (ref 4–33)
APPEARANCE UR: SIGNIFICANT CHANGE UP
AST SERPL-CCNC: 18 U/L — SIGNIFICANT CHANGE UP (ref 4–32)
BACTERIA # UR AUTO: SIGNIFICANT CHANGE UP
BASOPHILS # BLD AUTO: 0.02 K/UL — SIGNIFICANT CHANGE UP (ref 0–0.2)
BASOPHILS NFR BLD AUTO: 0.2 % — SIGNIFICANT CHANGE UP (ref 0–2)
BILIRUB SERPL-MCNC: 0.8 MG/DL — SIGNIFICANT CHANGE UP (ref 0.2–1.2)
BILIRUB UR-MCNC: NEGATIVE — SIGNIFICANT CHANGE UP
BLOOD UR QL VISUAL: NEGATIVE — SIGNIFICANT CHANGE UP
BUN SERPL-MCNC: 10 MG/DL — SIGNIFICANT CHANGE UP (ref 7–23)
CALCIUM SERPL-MCNC: 8.7 MG/DL — SIGNIFICANT CHANGE UP (ref 8.4–10.5)
CHLORIDE SERPL-SCNC: 103 MMOL/L — SIGNIFICANT CHANGE UP (ref 98–107)
CO2 SERPL-SCNC: 21 MMOL/L — LOW (ref 22–31)
COLOR SPEC: YELLOW — SIGNIFICANT CHANGE UP
CREAT SERPL-MCNC: 0.78 MG/DL — SIGNIFICANT CHANGE UP (ref 0.5–1.3)
EOSINOPHIL # BLD AUTO: 0 K/UL — SIGNIFICANT CHANGE UP (ref 0–0.5)
EOSINOPHIL NFR BLD AUTO: 0 % — SIGNIFICANT CHANGE UP (ref 0–6)
GLUCOSE SERPL-MCNC: 109 MG/DL — HIGH (ref 70–99)
GLUCOSE UR-MCNC: NEGATIVE — SIGNIFICANT CHANGE UP
HCT VFR BLD CALC: 37.6 % — SIGNIFICANT CHANGE UP (ref 34.5–45)
HGB BLD-MCNC: 12 G/DL — SIGNIFICANT CHANGE UP (ref 11.5–15.5)
IMM GRANULOCYTES # BLD AUTO: 0.03 # — SIGNIFICANT CHANGE UP
IMM GRANULOCYTES NFR BLD AUTO: 0.3 % — SIGNIFICANT CHANGE UP (ref 0–1.5)
KETONES UR-MCNC: NEGATIVE — SIGNIFICANT CHANGE UP
LEUKOCYTE ESTERASE UR-ACNC: HIGH
LYMPHOCYTES # BLD AUTO: 1.95 K/UL — SIGNIFICANT CHANGE UP (ref 1–3.3)
LYMPHOCYTES # BLD AUTO: 21.1 % — SIGNIFICANT CHANGE UP (ref 13–44)
MCHC RBC-ENTMCNC: 25.5 PG — LOW (ref 27–34)
MCHC RBC-ENTMCNC: 31.9 % — LOW (ref 32–36)
MCV RBC AUTO: 80 FL — SIGNIFICANT CHANGE UP (ref 80–100)
MONOCYTES # BLD AUTO: 0.47 K/UL — SIGNIFICANT CHANGE UP (ref 0–0.9)
MONOCYTES NFR BLD AUTO: 5.1 % — SIGNIFICANT CHANGE UP (ref 2–14)
MUCOUS THREADS # UR AUTO: SIGNIFICANT CHANGE UP
NEUTROPHILS # BLD AUTO: 6.78 K/UL — SIGNIFICANT CHANGE UP (ref 1.8–7.4)
NEUTROPHILS NFR BLD AUTO: 73.3 % — SIGNIFICANT CHANGE UP (ref 43–77)
NITRITE UR-MCNC: NEGATIVE — SIGNIFICANT CHANGE UP
NRBC # FLD: 0 — SIGNIFICANT CHANGE UP
PH UR: 6.5 — SIGNIFICANT CHANGE UP (ref 4.6–8)
PLATELET # BLD AUTO: 248 K/UL — SIGNIFICANT CHANGE UP (ref 150–400)
PMV BLD: 11.6 FL — SIGNIFICANT CHANGE UP (ref 7–13)
POTASSIUM SERPL-MCNC: 3.2 MMOL/L — LOW (ref 3.5–5.3)
POTASSIUM SERPL-SCNC: 3.2 MMOL/L — LOW (ref 3.5–5.3)
PROT SERPL-MCNC: 7.4 G/DL — SIGNIFICANT CHANGE UP (ref 6–8.3)
PROT UR-MCNC: 30 MG/DL — HIGH
RBC # BLD: 4.7 M/UL — SIGNIFICANT CHANGE UP (ref 3.8–5.2)
RBC # FLD: 14.7 % — HIGH (ref 10.3–14.5)
RBC CASTS # UR COMP ASSIST: SIGNIFICANT CHANGE UP (ref 0–?)
SODIUM SERPL-SCNC: 141 MMOL/L — SIGNIFICANT CHANGE UP (ref 135–145)
SP GR SPEC: 1.02 — SIGNIFICANT CHANGE UP (ref 1–1.04)
SQUAMOUS # UR AUTO: SIGNIFICANT CHANGE UP
UROBILINOGEN FLD QL: NORMAL MG/DL — SIGNIFICANT CHANGE UP
WBC # BLD: 9.25 K/UL — SIGNIFICANT CHANGE UP (ref 3.8–10.5)
WBC # FLD AUTO: 9.25 K/UL — SIGNIFICANT CHANGE UP (ref 3.8–10.5)
WBC UR QL: SIGNIFICANT CHANGE UP (ref 0–?)

## 2018-03-26 PROCEDURE — 74177 CT ABD & PELVIS W/CONTRAST: CPT | Mod: 26

## 2018-03-26 PROCEDURE — 99284 EMERGENCY DEPT VISIT MOD MDM: CPT | Mod: 25

## 2018-03-26 RX ORDER — ACETAMINOPHEN 500 MG
1000 TABLET ORAL ONCE
Qty: 0 | Refills: 0 | Status: DISCONTINUED | OUTPATIENT
Start: 2018-03-26 | End: 2018-03-26

## 2018-03-26 RX ORDER — CEPHALEXIN 500 MG
1 CAPSULE ORAL
Qty: 20 | Refills: 0 | OUTPATIENT
Start: 2018-03-26 | End: 2018-04-04

## 2018-03-26 RX ORDER — ACETAMINOPHEN 500 MG
650 TABLET ORAL ONCE
Qty: 0 | Refills: 0 | Status: COMPLETED | OUTPATIENT
Start: 2018-03-26 | End: 2018-03-26

## 2018-03-26 RX ORDER — CEPHALEXIN 500 MG
500 CAPSULE ORAL ONCE
Qty: 0 | Refills: 0 | Status: COMPLETED | OUTPATIENT
Start: 2018-03-26 | End: 2018-03-26

## 2018-03-26 RX ORDER — SODIUM CHLORIDE 9 MG/ML
1000 INJECTION INTRAMUSCULAR; INTRAVENOUS; SUBCUTANEOUS ONCE
Qty: 0 | Refills: 0 | Status: COMPLETED | OUTPATIENT
Start: 2018-03-26 | End: 2018-03-26

## 2018-03-26 RX ADMIN — Medication 500 MILLIGRAM(S): at 04:52

## 2018-03-26 RX ADMIN — Medication 650 MILLIGRAM(S): at 03:01

## 2018-03-26 RX ADMIN — SODIUM CHLORIDE 1000 MILLILITER(S): 9 INJECTION INTRAMUSCULAR; INTRAVENOUS; SUBCUTANEOUS at 03:01

## 2018-03-26 NOTE — ED PROVIDER NOTE - MEDICAL DECISION MAKING DETAILS
23yo female with pmh of bipolar, depression, asthma, hypothyroidism presents with lower abd pain - r/o appy vs diverticulitis

## 2018-03-26 NOTE — ED PROVIDER NOTE - ATTENDING CONTRIBUTION TO CARE
pt with lower abd pain for about a week now.  Now associated with some nausea and diarrhea.  No fevers an no vomiting.  No vag bleeding or dc.  Pain is crampy in nature and does not seem to radiate.      Gen: Well appearing in NAD  Head: NC/AT  Neck: trachea midline  Resp:  No distress  Abd: soft NT ND  : mild R CVA TTP  Ext: no deformities  Neuro:  A&O appears non focal  Skin:  Warm and dry as visualized    Pt presenting with lower abd pain.  Not consistent with ovarian pathology.  Not pregnant.  Will get labs and CT due to the patients underlying medical conditions and unreliable history giving.  Will dispo based on response to diagnostic an therapeutics.

## 2018-03-26 NOTE — ED ADULT NURSE REASSESSMENT NOTE - CONDITION
reports feeling edda. no vomiting or diarhhea whuil in ed. pt cl;earwed for dc. hl removed. given dc instructions by md.  left with boyfriend

## 2018-03-26 NOTE — ED ADULT NURSE NOTE - OBJECTIVE STATEMENT
rec'd pt in room 12 with boyfriend at bedside. pt c/o suprapubic pain, nausea, vomiting x2, diarrhea x2 and cold symptoms. denies dysuria. 20 gauge inserted left ac. blood and urine sent. given ivf and meds as ordered. pt to cat scan.
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
## 2018-03-26 NOTE — ED ADULT TRIAGE NOTE - BP NONINVASIVE SYSTOLIC (MM HG)
Received a phone call from Viri in [facility] Education stating that patient does not want an appointment to learn how to use the blood sugar machine, she just wants to read the instructions.  Viri will tell patient to start testing blood sugars in one month twice daily with varying times.    
124

## 2018-03-26 NOTE — ED PROVIDER NOTE - OBJECTIVE STATEMENT
21yo female with pmh of bipolar, depression, asthma, hypothyroidism presents with lower abd pain. Pt states for the past 1-2 weeks with lower abd pain and for the past 4 days with diarrhea, nausea and a couple of episodes of nbnb emesis. Pt also reports a sore throat for the past 3 days, denies cough. Pt denies fevers/chills, urinary symptoms, vaginal bleeding/discharge, ho STI, cp/sob/palp. Pt denies SI/HI/VH/AH.

## 2018-03-26 NOTE — ED ADULT TRIAGE NOTE - CHIEF COMPLAINT QUOTE
PT BIBEMS NSLIJ from [place] [facility] [address]. c/o Suprapubic pain since Morning yesterday with Nausea Vomiting x2 Diarrhea x2 with Chills. PT BIBEMS NSLIJ from [place] [facility] [address]. c/o Suprapubic pain since Morning yesterday with Nausea Vomiting x2 Diarrhea x2 with Chills. Also reports Sore throat. denies eating unusual food from Deli/Restaurant.

## 2018-04-07 ENCOUNTER — EMERGENCY (EMERGENCY)
Facility: HOSPITAL | Age: 23
LOS: 1 days | Discharge: HOME | End: 2018-04-07
Attending: EMERGENCY MEDICINE | Admitting: EMERGENCY MEDICINE
Payer: MEDICAID

## 2018-04-07 VITALS
RESPIRATION RATE: 16 BRPM | OXYGEN SATURATION: 100 % | DIASTOLIC BLOOD PRESSURE: 73 MMHG | SYSTOLIC BLOOD PRESSURE: 131 MMHG | HEART RATE: 80 BPM | TEMPERATURE: 99 F

## 2018-04-07 PROCEDURE — 99284 EMERGENCY DEPT VISIT MOD MDM: CPT | Mod: 25
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
## 2018-04-08 VITALS
TEMPERATURE: 98 F | OXYGEN SATURATION: 100 % | SYSTOLIC BLOOD PRESSURE: 138 MMHG | RESPIRATION RATE: 17 BRPM | DIASTOLIC BLOOD PRESSURE: 64 MMHG | HEART RATE: 88 BPM

## 2018-04-08 LAB
ALBUMIN SERPL ELPH-MCNC: 4.3 G/DL — SIGNIFICANT CHANGE UP (ref 3.3–5)
ALP SERPL-CCNC: 114 U/L — SIGNIFICANT CHANGE UP (ref 40–120)
ALT FLD-CCNC: 19 U/L — SIGNIFICANT CHANGE UP (ref 4–33)
APPEARANCE UR: SIGNIFICANT CHANGE UP
AST SERPL-CCNC: 16 U/L — SIGNIFICANT CHANGE UP (ref 4–32)
BACTERIA # UR AUTO: SIGNIFICANT CHANGE UP
BILIRUB SERPL-MCNC: 0.5 MG/DL — SIGNIFICANT CHANGE UP (ref 0.2–1.2)
BILIRUB UR-MCNC: NEGATIVE — SIGNIFICANT CHANGE UP
BLOOD UR QL VISUAL: HIGH
BUN SERPL-MCNC: 8 MG/DL — SIGNIFICANT CHANGE UP (ref 7–23)
CALCIUM SERPL-MCNC: 9 MG/DL — SIGNIFICANT CHANGE UP (ref 8.4–10.5)
CHLORIDE SERPL-SCNC: 102 MMOL/L — SIGNIFICANT CHANGE UP (ref 98–107)
CO2 SERPL-SCNC: 24 MMOL/L — SIGNIFICANT CHANGE UP (ref 22–31)
COLOR SPEC: YELLOW — SIGNIFICANT CHANGE UP
CREAT SERPL-MCNC: 0.81 MG/DL — SIGNIFICANT CHANGE UP (ref 0.5–1.3)
GLUCOSE SERPL-MCNC: 101 MG/DL — HIGH (ref 70–99)
GLUCOSE UR-MCNC: NEGATIVE — SIGNIFICANT CHANGE UP
HCT VFR BLD CALC: 37.7 % — SIGNIFICANT CHANGE UP (ref 34.5–45)
HGB BLD-MCNC: 11.7 G/DL — SIGNIFICANT CHANGE UP (ref 11.5–15.5)
KETONES UR-MCNC: NEGATIVE — SIGNIFICANT CHANGE UP
LEUKOCYTE ESTERASE UR-ACNC: HIGH
LIDOCAIN IGE QN: 23.2 U/L — SIGNIFICANT CHANGE UP (ref 7–60)
MCHC RBC-ENTMCNC: 25.1 PG — LOW (ref 27–34)
MCHC RBC-ENTMCNC: 31 % — LOW (ref 32–36)
MCV RBC AUTO: 80.7 FL — SIGNIFICANT CHANGE UP (ref 80–100)
MUCOUS THREADS # UR AUTO: SIGNIFICANT CHANGE UP
NITRITE UR-MCNC: NEGATIVE — SIGNIFICANT CHANGE UP
NON-SQ EPI CELLS # UR AUTO: <1 — SIGNIFICANT CHANGE UP
NRBC # FLD: 0 — SIGNIFICANT CHANGE UP
PH UR: 6.5 — SIGNIFICANT CHANGE UP (ref 4.6–8)
PLATELET # BLD AUTO: 299 K/UL — SIGNIFICANT CHANGE UP (ref 150–400)
PMV BLD: 11.1 FL — SIGNIFICANT CHANGE UP (ref 7–13)
POTASSIUM SERPL-MCNC: 4 MMOL/L — SIGNIFICANT CHANGE UP (ref 3.5–5.3)
POTASSIUM SERPL-SCNC: 4 MMOL/L — SIGNIFICANT CHANGE UP (ref 3.5–5.3)
PROT SERPL-MCNC: 7.3 G/DL — SIGNIFICANT CHANGE UP (ref 6–8.3)
PROT UR-MCNC: 30 MG/DL — HIGH
RBC # BLD: 4.67 M/UL — SIGNIFICANT CHANGE UP (ref 3.8–5.2)
RBC # FLD: 14.5 % — SIGNIFICANT CHANGE UP (ref 10.3–14.5)
RBC CASTS # UR COMP ASSIST: SIGNIFICANT CHANGE UP (ref 0–?)
SODIUM SERPL-SCNC: 139 MMOL/L — SIGNIFICANT CHANGE UP (ref 135–145)
SP GR SPEC: 1.02 — SIGNIFICANT CHANGE UP (ref 1–1.04)
SQUAMOUS # UR AUTO: SIGNIFICANT CHANGE UP
UROBILINOGEN FLD QL: NORMAL MG/DL — SIGNIFICANT CHANGE UP
WBC # BLD: 8.99 K/UL — SIGNIFICANT CHANGE UP (ref 3.8–10.5)
WBC # FLD AUTO: 8.99 K/UL — SIGNIFICANT CHANGE UP (ref 3.8–10.5)
WBC UR QL: >50 — HIGH (ref 0–?)

## 2018-04-08 RX ORDER — KETOROLAC TROMETHAMINE 30 MG/ML
15 SYRINGE (ML) INJECTION ONCE
Qty: 0 | Refills: 0 | Status: DISCONTINUED | OUTPATIENT
Start: 2018-04-08 | End: 2018-04-08

## 2018-04-08 RX ORDER — METOCLOPRAMIDE HCL 10 MG
10 TABLET ORAL ONCE
Qty: 0 | Refills: 0 | Status: COMPLETED | OUTPATIENT
Start: 2018-04-08 | End: 2018-04-08

## 2018-04-08 RX ORDER — CEFTRIAXONE 500 MG/1
1 INJECTION, POWDER, FOR SOLUTION INTRAMUSCULAR; INTRAVENOUS ONCE
Qty: 0 | Refills: 0 | Status: COMPLETED | OUTPATIENT
Start: 2018-04-08 | End: 2018-04-08

## 2018-04-08 RX ORDER — MOXIFLOXACIN HYDROCHLORIDE TABLETS, 400 MG 400 MG/1
1 TABLET, FILM COATED ORAL
Qty: 10 | Refills: 0 | OUTPATIENT
Start: 2018-04-08 | End: 2018-04-12

## 2018-04-08 RX ORDER — IBUPROFEN 200 MG
1 TABLET ORAL
Qty: 10 | Refills: 0 | OUTPATIENT
Start: 2018-04-08

## 2018-04-08 RX ORDER — SODIUM CHLORIDE 9 MG/ML
1000 INJECTION, SOLUTION INTRAVENOUS
Qty: 0 | Refills: 0 | Status: DISCONTINUED | OUTPATIENT
Start: 2018-04-08 | End: 2018-04-11

## 2018-04-08 RX ADMIN — Medication 15 MILLIGRAM(S): at 02:03

## 2018-04-08 RX ADMIN — SODIUM CHLORIDE 1000 MILLILITER(S): 9 INJECTION, SOLUTION INTRAVENOUS at 01:40

## 2018-04-08 RX ADMIN — Medication 10 MILLIGRAM(S): at 01:40

## 2018-04-08 RX ADMIN — Medication 15 MILLIGRAM(S): at 01:48

## 2018-04-08 RX ADMIN — CEFTRIAXONE 100 GRAM(S): 500 INJECTION, POWDER, FOR SOLUTION INTRAMUSCULAR; INTRAVENOUS at 03:36

## 2018-04-08 NOTE — ED PROVIDER NOTE - OBJECTIVE STATEMENT
22 year old female from [facility] presents with epigastric abd pain. patient seen multiple times for similar.  also with left sided headache.  no n/v. no diarrnea. also with dysuria.  denies fever

## 2018-04-08 NOTE — ED POST DISCHARGE NOTE - RESULT SUMMARY
LAITH Daniels: received admin call from RN from [facility] regarding pt's medication for cipro and which pharmacy it was sent to.

## 2018-04-08 NOTE — ED ADULT NURSE NOTE - OBJECTIVE STATEMENT
Jas RN- Pt received to rm 1 a/ox3 and ambulatory c/o abdominal pain x 3 months. Pt from [facility] states "my stomach and head hurt" Pt denies all other symptoms. 20G IV placed in RT AC, Labs sent, Meds administered as per MD order,

## 2018-04-09 LAB
BACTERIA UR CULT: SIGNIFICANT CHANGE UP
SPECIMEN SOURCE: SIGNIFICANT CHANGE UP

## 2018-04-18 ENCOUNTER — EMERGENCY (EMERGENCY)
Facility: HOSPITAL | Age: 23
LOS: 1 days | Discharge: HOME | End: 2018-04-18
Admitting: EMERGENCY MEDICINE
Payer: MEDICAID

## 2018-04-18 VITALS
TEMPERATURE: 96 F | OXYGEN SATURATION: 100 % | HEART RATE: 90 BPM | RESPIRATION RATE: 16 BRPM | DIASTOLIC BLOOD PRESSURE: 58 MMHG | SYSTOLIC BLOOD PRESSURE: 113 MMHG

## 2018-04-18 VITALS
DIASTOLIC BLOOD PRESSURE: 68 MMHG | OXYGEN SATURATION: 98 % | SYSTOLIC BLOOD PRESSURE: 127 MMHG | HEART RATE: 54 BPM | RESPIRATION RATE: 18 BRPM

## 2018-04-18 PROCEDURE — 99282 EMERGENCY DEPT VISIT SF MDM: CPT | Mod: 25

## 2018-04-18 RX ORDER — ACETAMINOPHEN 500 MG
650 TABLET ORAL ONCE
Qty: 0 | Refills: 0 | Status: COMPLETED | OUTPATIENT
Start: 2018-04-18 | End: 2018-04-18

## 2018-04-18 RX ORDER — PHENAZOPYRIDINE HCL 100 MG
1 TABLET ORAL
Qty: 9 | Refills: 0 | OUTPATIENT
Start: 2018-04-18 | End: 2018-04-20

## 2018-04-18 RX ADMIN — Medication 650 MILLIGRAM(S): at 04:29
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
## 2018-04-19 LAB
BACTERIA UR CULT: SIGNIFICANT CHANGE UP
SPECIMEN SOURCE: SIGNIFICANT CHANGE UP

## 2018-05-04 ENCOUNTER — EMERGENCY (EMERGENCY)
Facility: HOSPITAL | Age: 23
LOS: 1 days | Discharge: LEFT BEFORE DISCHARGE | End: 2018-05-04
Admitting: EMERGENCY MEDICINE

## 2018-05-04 VITALS
SYSTOLIC BLOOD PRESSURE: 131 MMHG | RESPIRATION RATE: 18 BRPM | TEMPERATURE: 98 F | HEART RATE: 91 BPM | OXYGEN SATURATION: 98 % | DIASTOLIC BLOOD PRESSURE: 82 MMHG
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
## 2018-05-09 NOTE — ED ADULT NURSE NOTE - PRIMARY CARE PROVIDER
zohreh Melolabial Transposition Flap Text: The defect edges were debeveled with a #15 scalpel blade.  Given the location of the defect and the proximity to free margins a melolabial flap was deemed most appropriate.  Using a sterile surgical marker, an appropriate melolabial transposition flap was drawn incorporating the defect.    The area thus outlined was incised deep to adipose tissue with a #15 scalpel blade.  The skin margins were undermined to an appropriate distance in all directions utilizing iris scissors.

## 2018-05-15 NOTE — ED PROVIDER NOTE - DATE/TIME 1
17-Jan-2018 08:47
I will START or STAY ON the medications listed below when I get home from the hospital:    ibuprofen 600 mg oral tablet  -- 1 tab(s) by mouth every 6 hours, As Needed  -- Indication: For pain    acetaminophen 325 mg oral tablet  -- 3 tab(s) by mouth every 6 hours, As Needed  -- Indication: For pain    Prenatal Multivitamins with Folic Acid 1 mg oral tablet  -- 1 tab(s) by mouth once a day  -- Indication: For Vitamins

## 2018-05-21 ENCOUNTER — EMERGENCY (EMERGENCY)
Facility: HOSPITAL | Age: 23
LOS: 1 days | Discharge: LEFT BEFORE DISCHARGE | End: 2018-05-21
Admitting: EMERGENCY MEDICINE
Payer: MEDICAID

## 2018-05-21 VITALS
HEART RATE: 55 BPM | TEMPERATURE: 99 F | SYSTOLIC BLOOD PRESSURE: 131 MMHG | RESPIRATION RATE: 16 BRPM | OXYGEN SATURATION: 99 % | DIASTOLIC BLOOD PRESSURE: 90 MMHG

## 2018-05-21 PROCEDURE — 99283 EMERGENCY DEPT VISIT LOW MDM: CPT

## 2018-05-21 RX ORDER — FAMOTIDINE 10 MG/ML
20 INJECTION INTRAVENOUS ONCE
Qty: 0 | Refills: 0 | Status: DISCONTINUED | OUTPATIENT
Start: 2018-05-21 | End: 2018-05-25
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
## 2018-05-21 NOTE — ED PROVIDER NOTE - ABDOMINAL EXAM
tender.../nondistended/nontender.../soft/Minimal epigastric tenderness. Abdomen otherwise soft and non-tender, non-distended.

## 2018-05-21 NOTE — ED PROVIDER NOTE - MEDICAL DECISION MAKING DETAILS
22y/o F with vomiting/diarrhea, abdominal pain, and R rib pain. Plan: Labs, UA, CXR, rib series, UCG, Pepcid, Maalox, reassess.

## 2018-06-29 ENCOUNTER — EMERGENCY (EMERGENCY)
Facility: HOSPITAL | Age: 23
LOS: 1 days | Discharge: HOME | End: 2018-06-29
Attending: EMERGENCY MEDICINE | Admitting: EMERGENCY MEDICINE
Payer: MEDICAID

## 2018-06-29 VITALS
RESPIRATION RATE: 16 BRPM | SYSTOLIC BLOOD PRESSURE: 110 MMHG | OXYGEN SATURATION: 100 % | DIASTOLIC BLOOD PRESSURE: 66 MMHG | TEMPERATURE: 98 F | HEART RATE: 68 BPM

## 2018-06-29 VITALS
OXYGEN SATURATION: 100 % | RESPIRATION RATE: 16 BRPM | HEART RATE: 64 BPM | SYSTOLIC BLOOD PRESSURE: 108 MMHG | TEMPERATURE: 99 F | DIASTOLIC BLOOD PRESSURE: 60 MMHG

## 2018-06-29 DIAGNOSIS — Z90.49 ACQUIRED ABSENCE OF OTHER SPECIFIED PARTS OF DIGESTIVE TRACT: Chronic | ICD-10-CM

## 2018-06-29 LAB
ALBUMIN SERPL ELPH-MCNC: 4.1 G/DL — SIGNIFICANT CHANGE UP (ref 3.3–5)
ALP SERPL-CCNC: 111 U/L — SIGNIFICANT CHANGE UP (ref 40–120)
ALT FLD-CCNC: 13 U/L — SIGNIFICANT CHANGE UP (ref 4–33)
APPEARANCE UR: CLEAR — SIGNIFICANT CHANGE UP
AST SERPL-CCNC: 12 U/L — SIGNIFICANT CHANGE UP (ref 4–32)
BACTERIA # UR AUTO: SIGNIFICANT CHANGE UP
BASOPHILS # BLD AUTO: 0.02 K/UL — SIGNIFICANT CHANGE UP (ref 0–0.2)
BASOPHILS NFR BLD AUTO: 0.2 % — SIGNIFICANT CHANGE UP (ref 0–2)
BILIRUB SERPL-MCNC: 0.5 MG/DL — SIGNIFICANT CHANGE UP (ref 0.2–1.2)
BILIRUB UR-MCNC: NEGATIVE — SIGNIFICANT CHANGE UP
BLOOD UR QL VISUAL: NEGATIVE — SIGNIFICANT CHANGE UP
BUN SERPL-MCNC: 5 MG/DL — LOW (ref 7–23)
CALCIUM SERPL-MCNC: 8.9 MG/DL — SIGNIFICANT CHANGE UP (ref 8.4–10.5)
CHLORIDE SERPL-SCNC: 102 MMOL/L — SIGNIFICANT CHANGE UP (ref 98–107)
CO2 SERPL-SCNC: 22 MMOL/L — SIGNIFICANT CHANGE UP (ref 22–31)
COLOR SPEC: SIGNIFICANT CHANGE UP
CREAT SERPL-MCNC: 0.55 MG/DL — SIGNIFICANT CHANGE UP (ref 0.5–1.3)
EOSINOPHIL # BLD AUTO: 0.01 K/UL — SIGNIFICANT CHANGE UP (ref 0–0.5)
EOSINOPHIL NFR BLD AUTO: 0.1 % — SIGNIFICANT CHANGE UP (ref 0–6)
GLUCOSE SERPL-MCNC: 99 MG/DL — SIGNIFICANT CHANGE UP (ref 70–99)
GLUCOSE UR-MCNC: NEGATIVE — SIGNIFICANT CHANGE UP
HCG SERPL-ACNC: 2601 MIU/ML — SIGNIFICANT CHANGE UP
HCT VFR BLD CALC: 36.1 % — SIGNIFICANT CHANGE UP (ref 34.5–45)
HGB BLD-MCNC: 11.8 G/DL — SIGNIFICANT CHANGE UP (ref 11.5–15.5)
IMM GRANULOCYTES # BLD AUTO: 0.02 # — SIGNIFICANT CHANGE UP
IMM GRANULOCYTES NFR BLD AUTO: 0.2 % — SIGNIFICANT CHANGE UP (ref 0–1.5)
KETONES UR-MCNC: NEGATIVE — SIGNIFICANT CHANGE UP
LEUKOCYTE ESTERASE UR-ACNC: HIGH
LYMPHOCYTES # BLD AUTO: 2.58 K/UL — SIGNIFICANT CHANGE UP (ref 1–3.3)
LYMPHOCYTES # BLD AUTO: 28.3 % — SIGNIFICANT CHANGE UP (ref 13–44)
MCHC RBC-ENTMCNC: 25 PG — LOW (ref 27–34)
MCHC RBC-ENTMCNC: 32.7 % — SIGNIFICANT CHANGE UP (ref 32–36)
MCV RBC AUTO: 76.5 FL — LOW (ref 80–100)
MONOCYTES # BLD AUTO: 0.48 K/UL — SIGNIFICANT CHANGE UP (ref 0–0.9)
MONOCYTES NFR BLD AUTO: 5.3 % — SIGNIFICANT CHANGE UP (ref 2–14)
NEUTROPHILS # BLD AUTO: 6.01 K/UL — SIGNIFICANT CHANGE UP (ref 1.8–7.4)
NEUTROPHILS NFR BLD AUTO: 65.9 % — SIGNIFICANT CHANGE UP (ref 43–77)
NITRITE UR-MCNC: NEGATIVE — SIGNIFICANT CHANGE UP
NRBC # FLD: 0 — SIGNIFICANT CHANGE UP
PH UR: 6.5 — SIGNIFICANT CHANGE UP (ref 4.6–8)
PLATELET # BLD AUTO: 316 K/UL — SIGNIFICANT CHANGE UP (ref 150–400)
PMV BLD: 11 FL — SIGNIFICANT CHANGE UP (ref 7–13)
POTASSIUM SERPL-MCNC: 3.7 MMOL/L — SIGNIFICANT CHANGE UP (ref 3.5–5.3)
POTASSIUM SERPL-SCNC: 3.7 MMOL/L — SIGNIFICANT CHANGE UP (ref 3.5–5.3)
PROT SERPL-MCNC: 7.5 G/DL — SIGNIFICANT CHANGE UP (ref 6–8.3)
PROT UR-MCNC: NEGATIVE MG/DL — SIGNIFICANT CHANGE UP
RBC # BLD: 4.72 M/UL — SIGNIFICANT CHANGE UP (ref 3.8–5.2)
RBC # FLD: 14.2 % — SIGNIFICANT CHANGE UP (ref 10.3–14.5)
RBC CASTS # UR COMP ASSIST: SIGNIFICANT CHANGE UP (ref 0–?)
SODIUM SERPL-SCNC: 139 MMOL/L — SIGNIFICANT CHANGE UP (ref 135–145)
SP GR SPEC: 1.01 — SIGNIFICANT CHANGE UP (ref 1–1.04)
SQUAMOUS # UR AUTO: SIGNIFICANT CHANGE UP
UROBILINOGEN FLD QL: NORMAL MG/DL — SIGNIFICANT CHANGE UP
WBC # BLD: 9.12 K/UL — SIGNIFICANT CHANGE UP (ref 3.8–10.5)
WBC # FLD AUTO: 9.12 K/UL — SIGNIFICANT CHANGE UP (ref 3.8–10.5)
WBC UR QL: SIGNIFICANT CHANGE UP (ref 0–?)

## 2018-06-29 PROCEDURE — 76817 TRANSVAGINAL US OBSTETRIC: CPT | Mod: 26

## 2018-06-29 PROCEDURE — 99284 EMERGENCY DEPT VISIT MOD MDM: CPT | Mod: 25

## 2018-06-29 PROCEDURE — 76830 TRANSVAGINAL US NON-OB: CPT | Mod: 26

## 2018-06-29 RX ORDER — CEPHALEXIN 500 MG
1 CAPSULE ORAL
Qty: 14 | Refills: 0 | OUTPATIENT
Start: 2018-06-29 | End: 2018-07-05

## 2018-06-29 NOTE — ED PROCEDURE NOTE - PROCEDURE ADDITIONAL DETAILS
Focused Ed ultrasound transvaginal OB 96321    Indication: to evaluate fetal well being.   Uterus scanned in two planes in gray scale and m mode.  There is an intrauterine sac without any yolk sac or internal echoes.   No free pelvic fluid noted.  Right ovary not visualized.   Left ovary with cyst.     Impression: no definitive IUP, left ovarian cyst. Ectopic pregnancy cannot be ruled out.   - Joan CÁRDENAS

## 2018-06-29 NOTE — ED PROVIDER NOTE - OBJECTIVE STATEMENT
Patient is a 22 yo F with history of asthma, hypothyroidism, history of seizures here for positive pregnancy test. Last period was early May. No vaginal bleeding, no urinary symptoms. No abdominal pain, no pelvic pain. Patient is a 24 yo F , LMP in early May with history of asthma, hypothyroidism, history of seizures here for positive pregnancy test. Last period was early May. No vaginal bleeding, no urinary symptoms. No abdominal pain, no pelvic pain. Patient is a 24 yo F , LMP in early May with history of asthma, hypothyroidism, history of seizures here for positive pregnancy test. Last period was early May. No vaginal bleeding, no urinary symptoms. No abdominal pain, no pelvic pain.  pmd: Dr. Irving gee: [facility] Patient is a 22 yo F , LMP in 18, 6 weeks and 5 days pregnant by LMP with history of asthma, hypothyroidism, history of seizures here for positive pregnancy test and evaluation of nausea, vomiting and generalized fatigue  No vaginal bleeding, no urinary symptoms. No abdominal pain, no pelvic pain.  pmd: Dr. Irving gee: [facility]

## 2018-06-29 NOTE — ED PROVIDER NOTE - PROGRESS NOTE DETAILS
No IUP on transvaginal US. GYN paged. Gestational sac w/o yolk sac on transvaginal US. GYN paged to ensure follow up in beta-clinic. GYN paged and will see patient. Discussed need for follow up in detail with patient. Patient is asking to be discharged. Explained that patient should have follow up within 2 days to have repeat beta-hcg. Patient states she prefers to follow up with her doctor. A copy of results provided. Ectopic precautions explained in detail. Patient understands to return for any pain, bleeding, passing out. Discussed need for follow up in detail with patient. Patient is asking to be discharged. Explained that patient should have follow up within 2 days to have repeat beta-hcg. Patient states she prefers to follow up with her doctor. She understands she can return to the ED for a beta-hcg if she cannot follow up in a timely manner. A copy of results provided. Patient did not want to wait for the ultrasound report or GYN. Patient also given antibiotics for uti during pregnancy. Ectopic precautions explained in detail. Patient understands to return for any pain, bleeding, passing out. Patient also understands she can return to the ED for repeat beta-hcg testing if she cannot attain follow up in a timely manner.
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
## 2018-06-29 NOTE — ED ADULT TRIAGE NOTE - CHIEF COMPLAINT QUOTE
Pt with positive pregnancy test at home. Had not followed up yet with OB. Pt states her last period was at the beginning of May 2018. c/o n/v and unable tolerate anything PO.

## 2018-06-29 NOTE — ED PROVIDER NOTE - MEDICAL DECISION MAKING DETAILS
22 yo F here for evaluation of positive pregnancy. ED US performed with no definitive IUP. Patient denies abdominal pain, pelvic pain. Labs ordered including beta-hcg to determine if early pregnancy vs possible ectopic. Patient O positive on chart review in past.

## 2018-07-01 ENCOUNTER — EMERGENCY (EMERGENCY)
Facility: HOSPITAL | Age: 23
LOS: 1 days | Discharge: HOME | End: 2018-07-01
Attending: EMERGENCY MEDICINE | Admitting: EMERGENCY MEDICINE
Payer: MEDICAID

## 2018-07-01 VITALS
TEMPERATURE: 98 F | SYSTOLIC BLOOD PRESSURE: 124 MMHG | HEART RATE: 60 BPM | OXYGEN SATURATION: 100 % | DIASTOLIC BLOOD PRESSURE: 65 MMHG | RESPIRATION RATE: 18 BRPM

## 2018-07-01 VITALS
RESPIRATION RATE: 16 BRPM | HEART RATE: 63 BPM | SYSTOLIC BLOOD PRESSURE: 105 MMHG | DIASTOLIC BLOOD PRESSURE: 69 MMHG | OXYGEN SATURATION: 100 %

## 2018-07-01 DIAGNOSIS — Z90.49 ACQUIRED ABSENCE OF OTHER SPECIFIED PARTS OF DIGESTIVE TRACT: Chronic | ICD-10-CM

## 2018-07-01 LAB — HCG SERPL-ACNC: 4319 MIU/ML — SIGNIFICANT CHANGE UP

## 2018-07-01 PROCEDURE — 99283 EMERGENCY DEPT VISIT LOW MDM: CPT
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
## 2018-07-01 NOTE — ED ADULT TRIAGE NOTE - CHIEF COMPLAINT QUOTE
pt presents for repeat HCG after being treated in the ED Friday for possible ectopic pregnancy. pt endorses nausea and vomiting this morning, denies any additional symptoms. PMHX: hypothyroid, asthma, seizures, cholelithiasis

## 2018-07-01 NOTE — ED PROVIDER NOTE - OBJECTIVE STATEMENT
23 yr old F presenting for repeat hCG.  Was seen at this ED 2 days ago for newly diagnosed pregnancy and N/V.  Hs still bee experiencing nausea and vomiting intermittently but able ot eat and drink.  Feels well in general.  HAs, at no time, had pain or vaginal bleeding.  Still has no pain or bleeding.  Since her TVUS showed only a gestational sac and no yolk sac she was referred to ED for serial hcg.  NO fever/chills.  Apparently, previous providers recommended Ob evaluation in ED (d/t elevated hcg and no convincing IUP), but she felt well and did not want to wait for them to come.

## 2018-07-01 NOTE — ED PROVIDER NOTE - MEDICAL DECISION MAKING DETAILS
Pregnant, emesis, tolerating po.  Well appearing, no pelvic pain or tenderness.  Will send hcg. Dina vogel and Ob.

## 2018-07-01 NOTE — ED PROVIDER NOTE - PROGRESS NOTE DETAILS
Pt feeling well, lab results was delayed over 1 hr d/t lab malfunction and she would like to leave as it is increasing.  I explained risks inlcuidng possible ectopic which could be life threatening.  No pain, no TTP in pelvic area, no vaginal bleeding.  Has an appt scheduled for 7/8 with her Gyn and would rather obtain US there.  Understands to return for any worsening or bleeding d/t risk of ectopic.  Alert and oriented, cooperative, appears to possess insight into condition and to accept risks and her condition.

## 2018-07-02 NOTE — ED PROVIDER NOTE - PMH
Will continue to follow  [id number] PRN Asthma    Bipolar disorder, currently in remission    Depression    Hypoparathyroidism    Hypothyroid    Obesity    Pre-diabetes    PTSD (post-traumatic stress disorder)

## 2018-07-23 ENCOUNTER — EMERGENCY (EMERGENCY)
Facility: HOSPITAL | Age: 23
LOS: 1 days | Discharge: LEFT BEFORE DISCHARGE | End: 2018-07-23
Admitting: EMERGENCY MEDICINE

## 2018-07-23 VITALS
DIASTOLIC BLOOD PRESSURE: 65 MMHG | HEART RATE: 78 BPM | SYSTOLIC BLOOD PRESSURE: 114 MMHG | OXYGEN SATURATION: 100 % | TEMPERATURE: 98 F | RESPIRATION RATE: 14 BRPM

## 2018-07-23 DIAGNOSIS — Z90.49 ACQUIRED ABSENCE OF OTHER SPECIFIED PARTS OF DIGESTIVE TRACT: Chronic | ICD-10-CM

## 2018-07-23 PROBLEM — F32.9 MAJOR DEPRESSIVE DISORDER, SINGLE EPISODE, UNSPECIFIED: Chronic | Status: ACTIVE | Noted: 2017-10-29

## 2018-07-23 PROBLEM — F31.70 BIPOLAR DISORDER, CURRENTLY IN REMISSION, MOST RECENT EPISODE UNSPECIFIED: Chronic | Status: ACTIVE | Noted: 2017-04-14

## 2018-07-23 PROBLEM — R56.9 UNSPECIFIED CONVULSIONS: Chronic | Status: ACTIVE | Noted: 2017-11-29

## 2018-07-23 NOTE — ED ADULT TRIAGE NOTE - CHIEF COMPLAINT QUOTE
Pt who is in her 8w+3d of pregnancy with confirmed IUP, , presents with c/o ongoing nausea, vomiting and inability to tolerate po intake.

## 2018-08-07 ENCOUNTER — EMERGENCY (EMERGENCY)
Facility: HOSPITAL | Age: 23
LOS: 1 days | Discharge: HOME | End: 2018-08-07
Attending: EMERGENCY MEDICINE | Admitting: EMERGENCY MEDICINE
Payer: MEDICAID

## 2018-08-07 VITALS
RESPIRATION RATE: 21 BRPM | DIASTOLIC BLOOD PRESSURE: 76 MMHG | HEART RATE: 102 BPM | SYSTOLIC BLOOD PRESSURE: 111 MMHG | OXYGEN SATURATION: 98 % | TEMPERATURE: 99 F

## 2018-08-07 DIAGNOSIS — Z90.49 ACQUIRED ABSENCE OF OTHER SPECIFIED PARTS OF DIGESTIVE TRACT: Chronic | ICD-10-CM

## 2018-08-07 LAB
ALBUMIN SERPL ELPH-MCNC: 4 G/DL — SIGNIFICANT CHANGE UP (ref 3.3–5)
ALP SERPL-CCNC: 83 U/L — SIGNIFICANT CHANGE UP (ref 40–120)
ALT FLD-CCNC: 10 U/L — SIGNIFICANT CHANGE UP (ref 4–33)
APPEARANCE UR: CLEAR — SIGNIFICANT CHANGE UP
AST SERPL-CCNC: 15 U/L — SIGNIFICANT CHANGE UP (ref 4–32)
BASOPHILS # BLD AUTO: 0.01 K/UL — SIGNIFICANT CHANGE UP (ref 0–0.2)
BASOPHILS NFR BLD AUTO: 0.1 % — SIGNIFICANT CHANGE UP (ref 0–2)
BILIRUB SERPL-MCNC: 0.5 MG/DL — SIGNIFICANT CHANGE UP (ref 0.2–1.2)
BILIRUB UR-MCNC: NEGATIVE — SIGNIFICANT CHANGE UP
BLOOD UR QL VISUAL: NEGATIVE — SIGNIFICANT CHANGE UP
BUN SERPL-MCNC: 4 MG/DL — LOW (ref 7–23)
CALCIUM SERPL-MCNC: 8.9 MG/DL — SIGNIFICANT CHANGE UP (ref 8.4–10.5)
CHLORIDE SERPL-SCNC: 101 MMOL/L — SIGNIFICANT CHANGE UP (ref 98–107)
CO2 SERPL-SCNC: 20 MMOL/L — LOW (ref 22–31)
COLOR SPEC: SIGNIFICANT CHANGE UP
CREAT SERPL-MCNC: 0.45 MG/DL — LOW (ref 0.5–1.3)
EOSINOPHIL # BLD AUTO: 0.02 K/UL — SIGNIFICANT CHANGE UP (ref 0–0.5)
EOSINOPHIL NFR BLD AUTO: 0.2 % — SIGNIFICANT CHANGE UP (ref 0–6)
GLUCOSE SERPL-MCNC: 88 MG/DL — SIGNIFICANT CHANGE UP (ref 70–99)
GLUCOSE UR-MCNC: NEGATIVE — SIGNIFICANT CHANGE UP
HCT VFR BLD CALC: 34.3 % — LOW (ref 34.5–45)
HGB BLD-MCNC: 11.3 G/DL — LOW (ref 11.5–15.5)
IMM GRANULOCYTES # BLD AUTO: 0.06 # — SIGNIFICANT CHANGE UP
IMM GRANULOCYTES NFR BLD AUTO: 0.7 % — SIGNIFICANT CHANGE UP (ref 0–1.5)
KETONES UR-MCNC: NEGATIVE — SIGNIFICANT CHANGE UP
LEUKOCYTE ESTERASE UR-ACNC: HIGH
LYMPHOCYTES # BLD AUTO: 2.06 K/UL — SIGNIFICANT CHANGE UP (ref 1–3.3)
LYMPHOCYTES # BLD AUTO: 22.9 % — SIGNIFICANT CHANGE UP (ref 13–44)
MCHC RBC-ENTMCNC: 25.8 PG — LOW (ref 27–34)
MCHC RBC-ENTMCNC: 32.9 % — SIGNIFICANT CHANGE UP (ref 32–36)
MCV RBC AUTO: 78.3 FL — LOW (ref 80–100)
MONOCYTES # BLD AUTO: 0.58 K/UL — SIGNIFICANT CHANGE UP (ref 0–0.9)
MONOCYTES NFR BLD AUTO: 6.5 % — SIGNIFICANT CHANGE UP (ref 2–14)
NEUTROPHILS # BLD AUTO: 6.26 K/UL — SIGNIFICANT CHANGE UP (ref 1.8–7.4)
NEUTROPHILS NFR BLD AUTO: 69.6 % — SIGNIFICANT CHANGE UP (ref 43–77)
NITRITE UR-MCNC: NEGATIVE — SIGNIFICANT CHANGE UP
NRBC # FLD: 0 — SIGNIFICANT CHANGE UP
PH UR: 7 — SIGNIFICANT CHANGE UP (ref 4.6–8)
PLATELET # BLD AUTO: 244 K/UL — SIGNIFICANT CHANGE UP (ref 150–400)
PMV BLD: 11.1 FL — SIGNIFICANT CHANGE UP (ref 7–13)
POTASSIUM SERPL-MCNC: 3.7 MMOL/L — SIGNIFICANT CHANGE UP (ref 3.5–5.3)
POTASSIUM SERPL-SCNC: 3.7 MMOL/L — SIGNIFICANT CHANGE UP (ref 3.5–5.3)
PROT SERPL-MCNC: 6.7 G/DL — SIGNIFICANT CHANGE UP (ref 6–8.3)
PROT UR-MCNC: NEGATIVE MG/DL — SIGNIFICANT CHANGE UP
RBC # BLD: 4.38 M/UL — SIGNIFICANT CHANGE UP (ref 3.8–5.2)
RBC # FLD: 14.6 % — HIGH (ref 10.3–14.5)
RBC CASTS # UR COMP ASSIST: SIGNIFICANT CHANGE UP (ref 0–?)
SODIUM SERPL-SCNC: 136 MMOL/L — SIGNIFICANT CHANGE UP (ref 135–145)
SP GR SPEC: 1.01 — SIGNIFICANT CHANGE UP (ref 1–1.04)
SQUAMOUS # UR AUTO: SIGNIFICANT CHANGE UP
UROBILINOGEN FLD QL: NORMAL MG/DL — SIGNIFICANT CHANGE UP
WBC # BLD: 8.99 K/UL — SIGNIFICANT CHANGE UP (ref 3.8–10.5)
WBC # FLD AUTO: 8.99 K/UL — SIGNIFICANT CHANGE UP (ref 3.8–10.5)
WBC UR QL: SIGNIFICANT CHANGE UP (ref 0–?)

## 2018-08-07 PROCEDURE — 99285 EMERGENCY DEPT VISIT HI MDM: CPT

## 2018-08-07 RX ORDER — ALBUTEROL 90 UG/1
2.5 AEROSOL, METERED ORAL ONCE
Qty: 0 | Refills: 0 | Status: COMPLETED | OUTPATIENT
Start: 2018-08-07 | End: 2018-08-07

## 2018-08-07 RX ORDER — SODIUM CHLORIDE 9 MG/ML
1000 INJECTION INTRAMUSCULAR; INTRAVENOUS; SUBCUTANEOUS ONCE
Qty: 0 | Refills: 0 | Status: COMPLETED | OUTPATIENT
Start: 2018-08-07 | End: 2018-08-07

## 2018-08-07 RX ADMIN — SODIUM CHLORIDE 1000 MILLILITER(S): 9 INJECTION INTRAMUSCULAR; INTRAVENOUS; SUBCUTANEOUS at 23:11

## 2018-08-07 RX ADMIN — ALBUTEROL 2.5 MILLIGRAM(S): 90 AEROSOL, METERED ORAL at 23:57

## 2018-08-07 NOTE — ED PROVIDER NOTE - ATTENDING CONTRIBUTION TO CARE
Dr. Main: I performed a face to face bedside interview with patient regarding history of present illness, review of symptoms and past medical history. I completed an independent physical exam.  I have discussed patient's plan of care with PA.   I agree with note as stated above, having amended the EMR as needed to reflect my findings.   This includes HISTORY OF PRESENT ILLNESS, HIV, PAST MEDICAL/SURGICAL/FAMILY/SOCIAL HISTORY, ALLERGIES AND HOME MEDICATIONS, REVIEW OF SYSTEMS, PHYSICAL EXAM, and any PROGRESS NOTES during the time I functioned as the attending physician for this patient. HPI above as by me. PE above as by me. Viral URI and subsequent costocondritis in pregnant asthmatic patient not compliant with ventolin DDX neb x 2, rvp, tvus, ua

## 2018-08-07 NOTE — ED PROVIDER NOTE - CARE PLAN
Principal Discharge DX:	Viral upper respiratory tract infection  Assessment and plan of treatment:	Seen in ER for upper respiratory tract infection in pregnancy. Please take Ventolin 2 puff every 4-6 hours as needed for cough, chest pain or shortness of breath. Take TYlenol 650 mg every 4 hours as needed for pain. Keep OB appointment. Return to ER for any new or worsening symptoms.

## 2018-08-07 NOTE — ED PROVIDER NOTE - OBJECTIVE STATEMENT
23:17 Etelvina att: 23 LMP  c/o 2d cough and chest pain. Initially sore throat, cough with sticky mucus. Gradual onset chets pain. Denies fever or chills. Has asthma, has not been taking ventolin PMH hypothyroid, asthma, ptsd, anxiety, depression PSH maci MED ALL SMOKE PCP PHARMACY 23:17 Etelvina att: 23 LMP  c/o 2d cough and chest pain. Initially sore throat, cough with sticky mucus. Gradual onset chets pain. Denies fever or chills. Has asthma, has not been taking ventolin PMH hypothyroid, asthma, ptsd, anxiety, depression PSH maci MED ALL SMOKE PCP PHARMACY    LAITH Bucio: 23yF @ 3 months gestation presenting pmhx asthma, hyperthyroid, anxiety, MDD sent in from [facility] with cough x 2 days. Pt states she originally had rhinorrhea and sharp throat pain which progressed to chest tightness and pain and cough productive of "sticky mucus". Additionally pt endorses 23:17 Etelvina att: 23 LMP  c/o 2d cough and chest pain. Initially sore throat, cough with sticky mucus. Gradual onset chets pain. Denies fever or chills. Has asthma, has not been taking ventolin PMH hypothyroid, asthma, ptsd, anxiety, depression PSH [facility]i [facility] ALL SMOKE PCP PHARMACY    LAITH Bucio: 23yF @ 3 months gestation presenting pmhx asthma, hyperthyroid, anxiety, MDD sent in from [facility] with cough x 2 days. Pt states she originally had rhinorrhea and sharp throat pain which progressed to chest tightness and pain and cough productive of "sticky mucus". Additionally pt endorses sharp pain with coughing in the upper abdomen and center of her chest. She has not been using her inhaler for her asthma. Pt denies fever/chills, shortness of breath, n/v/d, headache, vaginal discharge or bleeding, pelvic pain, recent travel or illness, leg pain or swelling or any other concerns. 23:17 Etelvina att: 23 LMP  c/o 2d cough and chest pain. Initially sore throat, cough with sticky mucus. Gradual onset intermitt chest pain, midsternal, sharp, non-rad, non-exertional, occurs after cough. Also notes post-tussive emesis. Denies fever or chills. Has asthma, has not been taking ventolin for cough. Denies pelvic pain, vag bleed, vag discharge, or dysuria. PMH hypothyroid, asthma, ptsd, anxiety, depression PSH maci MED ALL SMOKE PCP PHARMACY    LAITH Bucio: 23yF @ 3 months gestation presenting pmhx asthma, hyperthyroid, anxiety, MDD sent in from [facility] with cough x 2 days. Pt states she originally had rhinorrhea and sharp throat pain which progressed to chest tightness and pain and cough productive of "sticky mucus". Additionally pt endorses sharp pain with coughing in the upper abdomen and center of her chest. She has not been using her inhaler for her asthma. Pt denies fever/chills, shortness of breath, n/v/d, headache, vaginal discharge or bleeding, pelvic pain, recent travel or illness, leg pain or swelling or any other concerns.

## 2018-08-07 NOTE — ED ADULT NURSE NOTE - NSIMPLEMENTINTERV_GEN_ALL_ED
Implemented All Universal Safety Interventions:  [place] to call system. Call bell, personal items and telephone within reach. Instruct patient to call for assistance. Room bathroom lighting operational. Non-slip footwear when patient is off stretcher. Physically safe environment: no spills, clutter or unnecessary equipment. Stretcher in lowest position, wheels locked, appropriate side rails in place.

## 2018-08-07 NOTE — ED ADULT NURSE NOTE - OBJECTIVE STATEMENT
[facility] 24[facility], ambulatory, 3mo pregnant , c/o generalized body aches, chills, cough productive of yellow-green sputum, and chest congestion/pain worse w/ cough.  Denies abd pain, denies vaginal bleeding or complaints regarding to pregnancy.  Also states having trouble keeping food down 2/2 coughing and upset stomach.  No specific abd pain or tenderness to palpation.  PMHx PTSD, Depression.  Pt well appearing, vitally stable, afebrile at present.  IV placed to R AC #20g, labs drawn/sent as ordered, urine specimens sent, IVF NS Bolus infusing.  Awaiting reassessment then further plan of care.  Mask Applied. Will monitor.

## 2018-08-07 NOTE — ED ADULT NURSE NOTE - CHIEF COMPLAINT QUOTE
pt. BIBA from [facility] for runny nose, productive cough (with greenish phlegm), chills and chest pain upon coughing since yesterday. denies any SOB. pt. is 3 mos pregnant, denies any abd'l nor pelvic pain, no vag bleed, no dysuria/hematuria. PMHx: PTSD, Depression

## 2018-08-07 NOTE — ED ADULT TRIAGE NOTE - CHIEF COMPLAINT QUOTE
pt. BIBA from [facility] for runny nose, productive cough (with greenish phlegm), chills and chest pain upon coughing since yesterday. denies any SOB. pt. is 3 mos pregnant, denies any abd'l nor pelvic pain, no vag bleed, no dysuria/hematuria. PMHx: PTSD, Depression

## 2018-08-07 NOTE — ED PROVIDER NOTE - PROGRESS NOTE DETAILS
LAITH Bucio: Pt signed out to LAITH Eldridge pending US results, will treat urine with keflex as pt is pregnant, pt called into [id number] line

## 2018-08-07 NOTE — ED PROVIDER NOTE - MEDICAL DECISION MAKING DETAILS
23yF @3 months gestation, LMP approx 5/20, pmhx asthma, hyperthyroid, MDD, PTSD p/w cough and chest tightness x 2 days. Likely URI vs asthma exacerbation, no wheezing on exam, pt with dry cough. Will get US to evaluate pregnancy, cbc/cmp,hcg, ua and urine culture. Will give fluids and nebulizer treatment will reassess.

## 2018-08-07 NOTE — ED PROVIDER NOTE - PLAN OF CARE
Seen in ER for upper respiratory tract infection in pregnancy. Please take Ventolin 2 puff every 4-6 hours as needed for cough, chest pain or shortness of breath. Take TYlenol 650 mg every 4 hours as needed for pain. Keep OB appointment. Return to ER for any new or worsening symptoms.

## 2018-08-08 VITALS
OXYGEN SATURATION: 100 % | DIASTOLIC BLOOD PRESSURE: 50 MMHG | HEART RATE: 97 BPM | RESPIRATION RATE: 16 BRPM | SYSTOLIC BLOOD PRESSURE: 122 MMHG

## 2018-08-08 LAB
B PERT DNA SPEC QL NAA+PROBE: SIGNIFICANT CHANGE UP
C PNEUM DNA SPEC QL NAA+PROBE: NOT DETECTED — SIGNIFICANT CHANGE UP
FLUAV H1 2009 PAND RNA SPEC QL NAA+PROBE: NOT DETECTED — SIGNIFICANT CHANGE UP
FLUAV H1 RNA SPEC QL NAA+PROBE: NOT DETECTED — SIGNIFICANT CHANGE UP
FLUAV H3 RNA SPEC QL NAA+PROBE: NOT DETECTED — SIGNIFICANT CHANGE UP
FLUAV SUBTYP SPEC NAA+PROBE: SIGNIFICANT CHANGE UP
FLUBV RNA SPEC QL NAA+PROBE: NOT DETECTED — SIGNIFICANT CHANGE UP
HADV DNA SPEC QL NAA+PROBE: NOT DETECTED — SIGNIFICANT CHANGE UP
HCG SERPL-ACNC: SIGNIFICANT CHANGE UP MIU/ML
HCOV 229E RNA SPEC QL NAA+PROBE: NOT DETECTED — SIGNIFICANT CHANGE UP
HCOV HKU1 RNA SPEC QL NAA+PROBE: NOT DETECTED — SIGNIFICANT CHANGE UP
HCOV NL63 RNA SPEC QL NAA+PROBE: NOT DETECTED — SIGNIFICANT CHANGE UP
HCOV OC43 RNA SPEC QL NAA+PROBE: NOT DETECTED — SIGNIFICANT CHANGE UP
HMPV RNA SPEC QL NAA+PROBE: NOT DETECTED — SIGNIFICANT CHANGE UP
HPIV1 RNA SPEC QL NAA+PROBE: NOT DETECTED — SIGNIFICANT CHANGE UP
HPIV2 RNA SPEC QL NAA+PROBE: NOT DETECTED — SIGNIFICANT CHANGE UP
HPIV3 RNA SPEC QL NAA+PROBE: NOT DETECTED — SIGNIFICANT CHANGE UP
HPIV4 RNA SPEC QL NAA+PROBE: NOT DETECTED — SIGNIFICANT CHANGE UP
M PNEUMO DNA SPEC QL NAA+PROBE: NOT DETECTED — SIGNIFICANT CHANGE UP
RSV RNA SPEC QL NAA+PROBE: NOT DETECTED — SIGNIFICANT CHANGE UP
RV+EV RNA SPEC QL NAA+PROBE: POSITIVE — HIGH

## 2018-08-08 PROCEDURE — 76830 TRANSVAGINAL US NON-OB: CPT | Mod: 26

## 2018-08-08 RX ORDER — METRONIDAZOLE 500 MG
1 TABLET ORAL
Qty: 21 | Refills: 0 | OUTPATIENT
Start: 2018-08-08 | End: 2018-08-14

## 2018-08-08 RX ORDER — CEPHALEXIN 500 MG
1 CAPSULE ORAL
Qty: 14 | Refills: 0 | OUTPATIENT
Start: 2018-08-08 | End: 2018-08-14

## 2018-08-08 NOTE — ED POST DISCHARGE NOTE - RESULT SUMMARY
Telephone follow up  request : to follow up patient's RVP results. RVP all negative except Entero/Rhinovirus which is positive.

## 2018-08-08 NOTE — ED ADULT NURSE REASSESSMENT NOTE - NS ED NURSE REASSESS COMMENT FT1
Pt states improvement to symptoms.  Advised all results by LAITH Soria at bedside, copy instructions/results provided/ IV access removed and pt stable at time of exit from ED to home.

## 2018-08-09 LAB
BACTERIA UR CULT: SIGNIFICANT CHANGE UP
SPECIMEN SOURCE: SIGNIFICANT CHANGE UP

## 2018-08-17 NOTE — ED BEHAVIORAL HEALTH ASSESSMENT NOTE - PROFESSIONAL COLLATERAL PHONE
Detail Level: Zone Patient Specific Counseling (Will Not Stick From Patient To Patient): Pt given SK pamphlet Patient Specific Counseling (Will Not Stick From Patient To Patient): Laser to briefly discussed. Pt given cosmetic pamphlet so she call Lynsey and set up a consultation appt. Detail Level: Simple [phone]

## 2018-08-25 ENCOUNTER — EMERGENCY (EMERGENCY)
Facility: HOSPITAL | Age: 23
LOS: 1 days | Discharge: HOME | End: 2018-08-25
Attending: EMERGENCY MEDICINE | Admitting: EMERGENCY MEDICINE
Payer: MEDICAID

## 2018-08-25 VITALS
HEART RATE: 93 BPM | RESPIRATION RATE: 16 BRPM | OXYGEN SATURATION: 100 % | SYSTOLIC BLOOD PRESSURE: 117 MMHG | DIASTOLIC BLOOD PRESSURE: 74 MMHG | TEMPERATURE: 98 F

## 2018-08-25 DIAGNOSIS — F43.24 ADJUSTMENT DISORDER WITH DISTURBANCE OF CONDUCT: ICD-10-CM

## 2018-08-25 DIAGNOSIS — Z90.49 ACQUIRED ABSENCE OF OTHER SPECIFIED PARTS OF DIGESTIVE TRACT: Chronic | ICD-10-CM

## 2018-08-25 PROCEDURE — 99284 EMERGENCY DEPT VISIT MOD MDM: CPT | Mod: 25

## 2018-08-25 PROCEDURE — 90792 PSYCH DIAG EVAL W/MED SRVCS: CPT

## 2018-08-25 PROCEDURE — 76815 OB US LIMITED FETUS(S): CPT | Mod: 26
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
## 2018-08-25 NOTE — ED ADULT NURSE NOTE - OBJECTIVE STATEMENT
Pt received in  c/o aggressive behavior, pt denies SI,HI&AH. Denies ETOH and substance use. psych eval ongoing

## 2018-08-25 NOTE — ED BEHAVIORAL HEALTH ASSESSMENT NOTE - SUICIDE RISK FACTORS
Mood episode/Agitation/severe anxiety/Access to means (pills, firearms, etc.)/History of abuse/trauma/Highly impulsive behavior/Substance abuse/dependence Highly impulsive behavior/History of abuse/trauma

## 2018-08-25 NOTE — ED BEHAVIORAL HEALTH ASSESSMENT NOTE - CURRENT MEDICATION
Patient currently has difficulty recalling her medications.  From documented medication list as of Nov 17  Cipro 500 mg oral tablet: 1 tab(s) orally 2 times a day Keflex 500 mg oral capsule: 1 cap(s) orally 2 times a day cloZAPine 200 mg oral tablet: 1 tab(s) orally once a day (at bedtime) Patient denies taking this   Benadryl 50 mg oral capsule:  orally once a day (at bedtime), As Needed insomnia, Depakote 500 mg oral delayed release tablet: 1 tab(s) orally once a day, Colace: 200 milligram(s) orally 2 times a day  ferrous sulfate 325 mg (65 mg elemental iron) oral tablet: 1 tab(s) orally once a day, Atarax: 50 milligram(s) orally every 8 hours, As Needed allergies, isoniazid 300 mg oral tablet: 1 tab(s) orally once a day, levothyroxine 175 mcg (0.175 mg) oral tablet: 1 tab(s) orally once a day, multivitamin: 1 tab(s) orally once a day, nicotine 2 mg oral transmucosal gum: 1 tab(s) oral transmucosal every 6 hours, pyridoxine 50 mg oral tablet: 1 tab(s) orally once a day, senna 8.6 mg oral tablet: 1 tab(s) orally 2 times a day  thiamine 100 mg oral tablet: 1 tab(s) orally once a day, vitamin A & D topical cream: Apply topically to affected area 4 times a day Denies any current psych meds

## 2018-08-25 NOTE — ED BEHAVIORAL HEALTH ASSESSMENT NOTE - DESCRIPTION (FIRST USE, LAST USE, QUANTITY, FREQUENCY, DURATION)
There is a noted history of alcohol use disorder. Denies any recent use of alcohol First use at the age of 13, noted history of cannabis use disorder. Smokes 10-20 blunts per day. Last smoked this morning (a couple of blunts). started age 22, currently uses 5 cigarettes/day There is a noted history of alcohol use disorder. Denies any recent use of alcohol. First use at the age of 13, noted history of cannabis use disorder. Denies recent use of cannabis.
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
## 2018-08-25 NOTE — ED BEHAVIORAL HEALTH ASSESSMENT NOTE - SUICIDE PROTECTIVE FACTORS
Future oriented/Supportive social network or family/Engaged in work or school/Identifies reasons for living/Positive therapeutic relationships

## 2018-08-25 NOTE — ED ADULT NURSE NOTE - NSIMPLEMENTINTERV_GEN_ALL_ED
Implemented All Universal Safety Interventions:  [place] to call system. Call bell, personal items and telephone within reach. Instruct patient to call for assistance. Room bathroom lighting operational. Non-slip footwear when patient is off stretcher. Physically safe environment: no spills, clutter or unnecessary equipment. Stretcher in lowest position, wheels locked, appropriate side rails in place.
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
## 2018-08-25 NOTE — ED BEHAVIORAL HEALTH ASSESSMENT NOTE - AXIS III
Obesity, hypothyroidism, asthma, seizures, gallstones. Pregnancy, obesity, hypothyroidism, asthma, seizures, gallstones.
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
## 2018-08-25 NOTE — ED BEHAVIORAL HEALTH ASSESSMENT NOTE - OTHER PAST PSYCHIATRIC HISTORY (INCLUDE DETAILS REGARDING ONSET, COURSE OF ILLNESS, INPATIENT/OUTPATIENT TREATMENT)
Patient has a prior diagnosis of bipolar disorder, alcohol use disorder, cannabis use disorder, and rule out borderline personality disorder. She has a history of 10 prior inpatient psychiatric hospitalizations, most recently at [facility] in 11/2016 for suicidality.  Attends [facility] [facility] [employer] and [facility], Psychiatrist is Dr. South Schwartz ([phone]), Therapist is Ms. Joselyn George ([phone]. (As per psych evaluation in [facility])    Today, patient reports that her psychiatrist is Dr. Duke and her therapist is Dr. Galeano in which she reports that she has a scheduled appointment 11/26/17. Reports that she last saw Dr. Galeano on friday 11/24/17 Patient has a prior diagnosis of bipolar disorder, alcohol use disorder, cannabis use disorder, and rule out borderline personality disorder and recent trauma treatment focus. She has a history of 10 prior inpatient psychiatric hospitalizations, most recently at [facility] in 11/2016 for suicidality.  Attends [facility] [place], Psychiatrist is Dr. Sherwood and therapist Ms. Sullivan.

## 2018-08-25 NOTE — ED PROVIDER NOTE - PROGRESS NOTE DETAILS
Joan CÁRDENAS: received call from  that patient cleared for discharge. Will do US to confirm pregnancy and discharge. Joan CÁRDENAS: IUP on US with FHR of 164. Joan CÁRDENAS:  note reviewed: Patient has a scheduled appointment with her psychiatrist on 8/28/18 and verbalized that she will be attending this scheduled appointment
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
## 2018-08-25 NOTE — ED BEHAVIORAL HEALTH ASSESSMENT NOTE - DIFFERENTIAL
borderline PD  bipolar disorder complex PTSD  borderline PD  bipolar disorder adjustment disorder  complex PTSD  borderline PD  bipolar disorder
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
## 2018-08-25 NOTE — ED BEHAVIORAL HEALTH ASSESSMENT NOTE - PAST PSYCHOTROPIC MEDICATION
Multiple, including Lithium and Lamictal Multiple, including Lithium, Lamictal, Clozapine, Abilify, Depakote

## 2018-08-25 NOTE — ED BEHAVIORAL HEALTH ASSESSMENT NOTE - REFERRAL / APPOINTMENT DETAILS
Patient has a scheduled appointment with her psychiatrist on 8/28/18 and verbalized that she will be attending this scheduled appointment

## 2018-08-25 NOTE — ED PROCEDURE NOTE - PROCEDURE ADDITIONAL DETAILS
[id number], Ultrasound, transabdominal, pregnancy, limited    Focused ED ultrasound transabdominal OB to evaluate for Intrauterine Pregnancy:    Indication: assess fetal viability    Findings: Single intrauterine pregnancy noted  fetal heart rate measured: 164    impression: single live intrauterine pregnancy    Procedure note and images placed in chart

## 2018-09-17 ENCOUNTER — EMERGENCY (EMERGENCY)
Facility: HOSPITAL | Age: 23
LOS: 1 days | Discharge: HOME | End: 2018-09-17
Admitting: EMERGENCY MEDICINE
Payer: MEDICAID

## 2018-09-17 VITALS
OXYGEN SATURATION: 99 % | RESPIRATION RATE: 16 BRPM | DIASTOLIC BLOOD PRESSURE: 58 MMHG | SYSTOLIC BLOOD PRESSURE: 119 MMHG | TEMPERATURE: 99 F | HEART RATE: 62 BPM

## 2018-09-17 VITALS
OXYGEN SATURATION: 100 % | HEART RATE: 89 BPM | SYSTOLIC BLOOD PRESSURE: 113 MMHG | TEMPERATURE: 98 F | DIASTOLIC BLOOD PRESSURE: 55 MMHG | RESPIRATION RATE: 16 BRPM

## 2018-09-17 DIAGNOSIS — Z90.49 ACQUIRED ABSENCE OF OTHER SPECIFIED PARTS OF DIGESTIVE TRACT: Chronic | ICD-10-CM

## 2018-09-17 LAB
ALBUMIN SERPL ELPH-MCNC: 4.4 G/DL — SIGNIFICANT CHANGE UP (ref 3.3–5)
ALP SERPL-CCNC: 105 U/L — SIGNIFICANT CHANGE UP (ref 40–120)
ALT FLD-CCNC: 8 U/L — SIGNIFICANT CHANGE UP (ref 4–33)
APPEARANCE UR: CLEAR — SIGNIFICANT CHANGE UP
AST SERPL-CCNC: 14 U/L — SIGNIFICANT CHANGE UP (ref 4–32)
BACTERIA # UR AUTO: NEGATIVE — SIGNIFICANT CHANGE UP
BASE EXCESS BLDV CALC-SCNC: 4.1 MMOL/L — SIGNIFICANT CHANGE UP
BASOPHILS # BLD AUTO: 0.04 K/UL — SIGNIFICANT CHANGE UP (ref 0–0.2)
BASOPHILS NFR BLD AUTO: 0.5 % — SIGNIFICANT CHANGE UP (ref 0–2)
BILIRUB SERPL-MCNC: 0.7 MG/DL — SIGNIFICANT CHANGE UP (ref 0.2–1.2)
BILIRUB UR-MCNC: NEGATIVE — SIGNIFICANT CHANGE UP
BLOOD GAS VENOUS - CREATININE: 0.59 MG/DL — SIGNIFICANT CHANGE UP (ref 0.5–1.3)
BLOOD UR QL VISUAL: NEGATIVE — SIGNIFICANT CHANGE UP
BUN SERPL-MCNC: 9 MG/DL — SIGNIFICANT CHANGE UP (ref 7–23)
CALCIUM SERPL-MCNC: 9.3 MG/DL — SIGNIFICANT CHANGE UP (ref 8.4–10.5)
CHLORIDE BLDV-SCNC: 106 MMOL/L — SIGNIFICANT CHANGE UP (ref 96–108)
CHLORIDE SERPL-SCNC: 101 MMOL/L — SIGNIFICANT CHANGE UP (ref 98–107)
CO2 SERPL-SCNC: 24 MMOL/L — SIGNIFICANT CHANGE UP (ref 22–31)
COLOR SPEC: SIGNIFICANT CHANGE UP
CREAT SERPL-MCNC: 0.61 MG/DL — SIGNIFICANT CHANGE UP (ref 0.5–1.3)
EOSINOPHIL # BLD AUTO: 0.02 K/UL — SIGNIFICANT CHANGE UP (ref 0–0.5)
EOSINOPHIL NFR BLD AUTO: 0.3 % — SIGNIFICANT CHANGE UP (ref 0–6)
GAS PNL BLDV: 134 MMOL/L — LOW (ref 136–146)
GLUCOSE BLDV-MCNC: 85 — SIGNIFICANT CHANGE UP (ref 70–99)
GLUCOSE SERPL-MCNC: 82 MG/DL — SIGNIFICANT CHANGE UP (ref 70–99)
GLUCOSE UR-MCNC: NEGATIVE — SIGNIFICANT CHANGE UP
HCG SERPL-ACNC: 22.56 MIU/ML — SIGNIFICANT CHANGE UP
HCO3 BLDV-SCNC: 26 MMOL/L — SIGNIFICANT CHANGE UP (ref 20–27)
HCT VFR BLD CALC: 41.6 % — SIGNIFICANT CHANGE UP (ref 34.5–45)
HCT VFR BLDV CALC: 41.6 % — SIGNIFICANT CHANGE UP (ref 34.5–45)
HGB BLD-MCNC: 12.8 G/DL — SIGNIFICANT CHANGE UP (ref 11.5–15.5)
HGB BLDV-MCNC: 13.5 G/DL — SIGNIFICANT CHANGE UP (ref 11.5–15.5)
HYALINE CASTS # UR AUTO: NEGATIVE — SIGNIFICANT CHANGE UP
IMM GRANULOCYTES # BLD AUTO: 0.01 # — SIGNIFICANT CHANGE UP
IMM GRANULOCYTES NFR BLD AUTO: 0.1 % — SIGNIFICANT CHANGE UP (ref 0–1.5)
KETONES UR-MCNC: NEGATIVE — SIGNIFICANT CHANGE UP
LACTATE BLDV-MCNC: 1.1 MMOL/L — SIGNIFICANT CHANGE UP (ref 0.5–2)
LEUKOCYTE ESTERASE UR-ACNC: SIGNIFICANT CHANGE UP
LYMPHOCYTES # BLD AUTO: 3.28 K/UL — SIGNIFICANT CHANGE UP (ref 1–3.3)
LYMPHOCYTES # BLD AUTO: 41.4 % — SIGNIFICANT CHANGE UP (ref 13–44)
MCHC RBC-ENTMCNC: 25.7 PG — LOW (ref 27–34)
MCHC RBC-ENTMCNC: 30.8 % — LOW (ref 32–36)
MCV RBC AUTO: 83.4 FL — SIGNIFICANT CHANGE UP (ref 80–100)
MONOCYTES # BLD AUTO: 0.43 K/UL — SIGNIFICANT CHANGE UP (ref 0–0.9)
MONOCYTES NFR BLD AUTO: 5.4 % — SIGNIFICANT CHANGE UP (ref 2–14)
NEUTROPHILS # BLD AUTO: 4.14 K/UL — SIGNIFICANT CHANGE UP (ref 1.8–7.4)
NEUTROPHILS NFR BLD AUTO: 52.3 % — SIGNIFICANT CHANGE UP (ref 43–77)
NITRITE UR-MCNC: NEGATIVE — SIGNIFICANT CHANGE UP
NRBC # FLD: 0 — SIGNIFICANT CHANGE UP
PCO2 BLDV: 54 MMHG — HIGH (ref 41–51)
PH BLDV: 7.36 PH — SIGNIFICANT CHANGE UP (ref 7.32–7.43)
PH UR: 8 — SIGNIFICANT CHANGE UP (ref 5–8)
PLATELET # BLD AUTO: 334 K/UL — SIGNIFICANT CHANGE UP (ref 150–400)
PMV BLD: 10.6 FL — SIGNIFICANT CHANGE UP (ref 7–13)
PO2 BLDV: 27 MMHG — LOW (ref 35–40)
POTASSIUM BLDV-SCNC: 3.9 MMOL/L — SIGNIFICANT CHANGE UP (ref 3.4–4.5)
POTASSIUM SERPL-MCNC: 4.2 MMOL/L — SIGNIFICANT CHANGE UP (ref 3.5–5.3)
POTASSIUM SERPL-SCNC: 4.2 MMOL/L — SIGNIFICANT CHANGE UP (ref 3.5–5.3)
PROT SERPL-MCNC: 7.7 G/DL — SIGNIFICANT CHANGE UP (ref 6–8.3)
PROT UR-MCNC: 10 — SIGNIFICANT CHANGE UP
RBC # BLD: 4.99 M/UL — SIGNIFICANT CHANGE UP (ref 3.8–5.2)
RBC # FLD: 15.5 % — HIGH (ref 10.3–14.5)
RBC CASTS # UR COMP ASSIST: HIGH (ref 0–?)
SAO2 % BLDV: 39.2 % — LOW (ref 60–85)
SODIUM SERPL-SCNC: 138 MMOL/L — SIGNIFICANT CHANGE UP (ref 135–145)
SP GR SPEC: 1.02 — SIGNIFICANT CHANGE UP (ref 1–1.04)
SQUAMOUS # UR AUTO: SIGNIFICANT CHANGE UP
UROBILINOGEN FLD QL: NORMAL — SIGNIFICANT CHANGE UP
WBC # BLD: 7.92 K/UL — SIGNIFICANT CHANGE UP (ref 3.8–10.5)
WBC # FLD AUTO: 7.92 K/UL — SIGNIFICANT CHANGE UP (ref 3.8–10.5)
WBC UR QL: HIGH (ref 0–?)

## 2018-09-17 PROCEDURE — 99284 EMERGENCY DEPT VISIT MOD MDM: CPT

## 2018-09-17 RX ORDER — ONDANSETRON 8 MG/1
4 TABLET, FILM COATED ORAL ONCE
Qty: 0 | Refills: 0 | Status: COMPLETED | OUTPATIENT
Start: 2018-09-17 | End: 2018-09-17

## 2018-09-17 RX ORDER — ONDANSETRON 8 MG/1
1 TABLET, FILM COATED ORAL
Qty: 15 | Refills: 0 | OUTPATIENT
Start: 2018-09-17 | End: 2018-09-21

## 2018-09-17 RX ORDER — SODIUM CHLORIDE 9 MG/ML
1000 INJECTION INTRAMUSCULAR; INTRAVENOUS; SUBCUTANEOUS ONCE
Qty: 0 | Refills: 0 | Status: COMPLETED | OUTPATIENT
Start: 2018-09-17 | End: 2018-09-17

## 2018-09-17 RX ADMIN — SODIUM CHLORIDE 1000 MILLILITER(S): 9 INJECTION INTRAMUSCULAR; INTRAVENOUS; SUBCUTANEOUS at 21:19

## 2018-09-17 RX ADMIN — ONDANSETRON 4 MILLIGRAM(S): 8 TABLET, FILM COATED ORAL at 21:19

## 2018-09-17 NOTE — ED ADULT NURSE NOTE - OBJECTIVE STATEMENT
Pt c/o right-sided abdominal pain with nausea.  LMP 18, .  Pt reports recent +pregnancy test.  Pt AAOx3, respirations even and unlabored.  Pt denies urinary symptoms.  Labs drawn and sent; IV access obtained.  Further orders pending.

## 2018-09-17 NOTE — ED PROVIDER NOTE - CHPI ED SYMPTOMS NEG
no back pain/no fever/no discharge/no abdominal pain/no dysuria/no chills/no diarrhea,  neck pain, HA, dizziness, CP, SOB, numbness/tingling/weakness, urinary sx/no vaginal discharge/no pain no abdominal pain/no dysuria/no chills/no discharge/no back pain/no fever/no pain/no vaginal discharge

## 2018-09-17 NOTE — ED PROVIDER NOTE - PLAN OF CARE
pls rest, drink plenty of fluids, take your zofran as prescribed, f/u with your obgyn as soon as possible, return for any worsening symptoms or any o ther concerning symptoms

## 2018-09-17 NOTE — ED PROVIDER NOTE - PROGRESS NOTE DETAILS
PA SHCUSTER: Pt tolerating po at this time Klepfish: Pt was not seen or evaluated by me. Signed as per hospital policy.

## 2018-09-17 NOTE — ED PROVIDER NOTE - CARE PLAN
Principal Discharge DX:	Pregnancy  Assessment and plan of treatment:	pls rest, drink plenty of fluids, take your zofran as prescribed, f/u with your obgyn as soon as possible, return for any worsening symptoms or any o ther concerning symptoms

## 2018-09-19 NOTE — ED ADULT TRIAGE NOTE - NS ED NOTE AC HIGH RISK COUNTRIES
Last A1c is 5.5, consistent with his lower end PM glucose levels  I advised to start checking in the AM in a fasting state to get a better idea of the appropriate insulin dose.    Recommend continuing to wean insulin to achieve goal fasting glucose ~ avoiding hypoglycemia. If his sugars remain at goal at next visit, consider switching to oral therapy.   No

## 2018-09-27 ENCOUNTER — EMERGENCY (EMERGENCY)
Facility: HOSPITAL | Age: 23
LOS: 1 days | Discharge: HOME | End: 2018-09-27
Attending: EMERGENCY MEDICINE | Admitting: EMERGENCY MEDICINE
Payer: MEDICAID

## 2018-09-27 VITALS
SYSTOLIC BLOOD PRESSURE: 112 MMHG | TEMPERATURE: 98 F | RESPIRATION RATE: 18 BRPM | HEART RATE: 75 BPM | OXYGEN SATURATION: 100 % | DIASTOLIC BLOOD PRESSURE: 64 MMHG

## 2018-09-27 VITALS
RESPIRATION RATE: 18 BRPM | DIASTOLIC BLOOD PRESSURE: 56 MMHG | HEART RATE: 75 BPM | TEMPERATURE: 98 F | SYSTOLIC BLOOD PRESSURE: 107 MMHG | OXYGEN SATURATION: 100 %

## 2018-09-27 DIAGNOSIS — Z90.49 ACQUIRED ABSENCE OF OTHER SPECIFIED PARTS OF DIGESTIVE TRACT: Chronic | ICD-10-CM

## 2018-09-27 LAB
ALBUMIN SERPL ELPH-MCNC: 3.9 G/DL — SIGNIFICANT CHANGE UP (ref 3.3–5)
ALP SERPL-CCNC: 104 U/L — SIGNIFICANT CHANGE UP (ref 40–120)
ALT FLD-CCNC: 10 U/L — SIGNIFICANT CHANGE UP (ref 4–33)
APPEARANCE UR: SIGNIFICANT CHANGE UP
AST SERPL-CCNC: 11 U/L — SIGNIFICANT CHANGE UP (ref 4–32)
BACTERIA # UR AUTO: SIGNIFICANT CHANGE UP
BASOPHILS # BLD AUTO: 0.03 K/UL — SIGNIFICANT CHANGE UP (ref 0–0.2)
BASOPHILS NFR BLD AUTO: 0.4 % — SIGNIFICANT CHANGE UP (ref 0–2)
BILIRUB SERPL-MCNC: 0.2 MG/DL — SIGNIFICANT CHANGE UP (ref 0.2–1.2)
BILIRUB UR-MCNC: NEGATIVE — SIGNIFICANT CHANGE UP
BLOOD UR QL VISUAL: SIGNIFICANT CHANGE UP
BUN SERPL-MCNC: 9 MG/DL — SIGNIFICANT CHANGE UP (ref 7–23)
CALCIUM SERPL-MCNC: 8.7 MG/DL — SIGNIFICANT CHANGE UP (ref 8.4–10.5)
CHLORIDE SERPL-SCNC: 103 MMOL/L — SIGNIFICANT CHANGE UP (ref 98–107)
CO2 SERPL-SCNC: 19 MMOL/L — LOW (ref 22–31)
COLOR SPEC: YELLOW — SIGNIFICANT CHANGE UP
CREAT SERPL-MCNC: 0.74 MG/DL — SIGNIFICANT CHANGE UP (ref 0.5–1.3)
EOSINOPHIL # BLD AUTO: 0.01 K/UL — SIGNIFICANT CHANGE UP (ref 0–0.5)
EOSINOPHIL NFR BLD AUTO: 0.1 % — SIGNIFICANT CHANGE UP (ref 0–6)
GLUCOSE SERPL-MCNC: 95 MG/DL — SIGNIFICANT CHANGE UP (ref 70–99)
GLUCOSE UR-MCNC: NEGATIVE — SIGNIFICANT CHANGE UP
HCG SERPL-ACNC: < 5 MIU/ML — SIGNIFICANT CHANGE UP
HCT VFR BLD CALC: 37.7 % — SIGNIFICANT CHANGE UP (ref 34.5–45)
HGB BLD-MCNC: 12 G/DL — SIGNIFICANT CHANGE UP (ref 11.5–15.5)
IMM GRANULOCYTES # BLD AUTO: 0.02 # — SIGNIFICANT CHANGE UP
IMM GRANULOCYTES NFR BLD AUTO: 0.2 % — SIGNIFICANT CHANGE UP (ref 0–1.5)
KETONES UR-MCNC: NEGATIVE — SIGNIFICANT CHANGE UP
LEUKOCYTE ESTERASE UR-ACNC: HIGH
LIDOCAIN IGE QN: 39.5 U/L — SIGNIFICANT CHANGE UP (ref 7–60)
LYMPHOCYTES # BLD AUTO: 3.77 K/UL — HIGH (ref 1–3.3)
LYMPHOCYTES # BLD AUTO: 45.3 % — HIGH (ref 13–44)
MCHC RBC-ENTMCNC: 26.4 PG — LOW (ref 27–34)
MCHC RBC-ENTMCNC: 31.8 % — LOW (ref 32–36)
MCV RBC AUTO: 82.9 FL — SIGNIFICANT CHANGE UP (ref 80–100)
MONOCYTES # BLD AUTO: 0.51 K/UL — SIGNIFICANT CHANGE UP (ref 0–0.9)
MONOCYTES NFR BLD AUTO: 6.1 % — SIGNIFICANT CHANGE UP (ref 2–14)
NEUTROPHILS # BLD AUTO: 3.98 K/UL — SIGNIFICANT CHANGE UP (ref 1.8–7.4)
NEUTROPHILS NFR BLD AUTO: 47.9 % — SIGNIFICANT CHANGE UP (ref 43–77)
NITRITE UR-MCNC: NEGATIVE — SIGNIFICANT CHANGE UP
NRBC # FLD: 0 — SIGNIFICANT CHANGE UP
PH UR: 6.5 — SIGNIFICANT CHANGE UP (ref 5–8)
PLATELET # BLD AUTO: 295 K/UL — SIGNIFICANT CHANGE UP (ref 150–400)
PMV BLD: 11.2 FL — SIGNIFICANT CHANGE UP (ref 7–13)
POTASSIUM SERPL-MCNC: 3.5 MMOL/L — SIGNIFICANT CHANGE UP (ref 3.5–5.3)
POTASSIUM SERPL-SCNC: 3.5 MMOL/L — SIGNIFICANT CHANGE UP (ref 3.5–5.3)
PROT SERPL-MCNC: 6.8 G/DL — SIGNIFICANT CHANGE UP (ref 6–8.3)
PROT UR-MCNC: SIGNIFICANT CHANGE UP
RBC # BLD: 4.55 M/UL — SIGNIFICANT CHANGE UP (ref 3.8–5.2)
RBC # FLD: 14.9 % — HIGH (ref 10.3–14.5)
RBC CASTS # UR COMP ASSIST: >50 — HIGH (ref 0–?)
SODIUM SERPL-SCNC: 142 MMOL/L — SIGNIFICANT CHANGE UP (ref 135–145)
SP GR SPEC: 1.01 — SIGNIFICANT CHANGE UP (ref 1–1.04)
SQUAMOUS # UR AUTO: SIGNIFICANT CHANGE UP
UROBILINOGEN FLD QL: NORMAL — SIGNIFICANT CHANGE UP
WBC # BLD: 8.32 K/UL — SIGNIFICANT CHANGE UP (ref 3.8–10.5)
WBC # FLD AUTO: 8.32 K/UL — SIGNIFICANT CHANGE UP (ref 3.8–10.5)
WBC UR QL: HIGH (ref 0–?)

## 2018-09-27 PROCEDURE — 99284 EMERGENCY DEPT VISIT MOD MDM: CPT | Mod: 25

## 2018-09-27 PROCEDURE — 74177 CT ABD & PELVIS W/CONTRAST: CPT | Mod: 26

## 2018-09-27 RX ORDER — ACETAMINOPHEN 500 MG
975 TABLET ORAL ONCE
Qty: 0 | Refills: 0 | Status: COMPLETED | OUTPATIENT
Start: 2018-09-27 | End: 2018-09-27

## 2018-09-27 RX ORDER — NITROFURANTOIN MACROCRYSTAL 50 MG
1 CAPSULE ORAL
Qty: 14 | Refills: 0 | OUTPATIENT
Start: 2018-09-27 | End: 2018-10-03

## 2018-09-27 RX ORDER — SODIUM CHLORIDE 9 MG/ML
1000 INJECTION INTRAMUSCULAR; INTRAVENOUS; SUBCUTANEOUS ONCE
Qty: 0 | Refills: 0 | Status: COMPLETED | OUTPATIENT
Start: 2018-09-27 | End: 2018-09-27

## 2018-09-27 RX ORDER — NITROFURANTOIN MACROCRYSTAL 50 MG
100 CAPSULE ORAL ONCE
Qty: 0 | Refills: 0 | Status: COMPLETED | OUTPATIENT
Start: 2018-09-27 | End: 2018-09-27

## 2018-09-27 RX ORDER — ACETAMINOPHEN 500 MG
2 TABLET ORAL
Qty: 40 | Refills: 0 | OUTPATIENT
Start: 2018-09-27 | End: 2018-10-01

## 2018-09-27 RX ADMIN — Medication 975 MILLIGRAM(S): at 03:44

## 2018-09-27 RX ADMIN — SODIUM CHLORIDE 1000 MILLILITER(S): 9 INJECTION INTRAMUSCULAR; INTRAVENOUS; SUBCUTANEOUS at 03:45

## 2018-09-27 RX ADMIN — SODIUM CHLORIDE 1000 MILLILITER(S): 9 INJECTION INTRAMUSCULAR; INTRAVENOUS; SUBCUTANEOUS at 09:15

## 2018-09-27 RX ADMIN — Medication 100 MILLIGRAM(S): at 09:20

## 2018-09-27 NOTE — ED ADULT NURSE NOTE - NSIMPLEMENTINTERV_GEN_ALL_ED
Implemented All Universal Safety Interventions:  Donald to call system. Call bell, personal items and telephone within reach. Instruct patient to call for assistance. Room bathroom lighting operational. Non-slip footwear when patient is off stretcher. Physically safe environment: no spills, clutter or unnecessary equipment. Stretcher in lowest position, wheels locked, appropriate side rails in place.

## 2018-09-27 NOTE — ED PROVIDER NOTE - NSFOLLOWUPINSTRUCTIONS_ED_ALL_ED_FT
Follow up with your PMD within 48-72 hours.  Take Macrobid 100mg 1 tab 2x/day for 7 days.  Increase fluids. Tylenol 650mg every 4-6hours as needed for discomfort. Worsening pain, new fever, chills, nausea, vomiting return to ER.

## 2018-09-27 NOTE — ED PROVIDER NOTE - CARE PLAN
Principal Discharge DX:	Cystitis  Assessment and plan of treatment:	Follow up with your PMD within 48-72 hours.  Take Macrobid 100mg 1 tab 2x/day for 7 days.  Increase fluids. Tylenol 650mg every 4-6hours as needed for discomfort. Worsening pain, new fever, chills, nausea, vomiting return to ER.

## 2018-09-27 NOTE — ED PROVIDER NOTE - PHYSICAL EXAMINATION
***GEN - NAD; well appearing; A+O x3 ***HEAD - NC/AT   ***PULMONARY - CTA b/l, symmetric breath sounds. ***CARDIAC -s1s2, RRR, no M,G,R  ***ABDOMEN - ND, NT, soft, no guarding, no rebound, no masses    ***SKIN - no rash or bruising   ***NEUROLOGIC - alert and oriented, follows commands, sensation nl, motor nl, ***PSYCH - insight and judgment nl, memory nl, affect nl, thought nl ***GEN - NAD; well appearing; A+O x3 ***HEAD - NC/AT   ***PULMONARY - CTA b/l, symmetric breath sounds. ***CARDIAC -s1s2, RRR, no M,G,R  ***ABDOMEN - mild suprapubic TTP w/o rebound/guarding/maximiliano's   ***SKIN - no rash or bruising   ***NEUROLOGIC - alert and oriented, follows commands, sensation nl, motor nl, ***PSYCH - insight and judgment nl, memory nl, affect nl, thought nl

## 2018-09-27 NOTE — ED ADULT TRIAGE NOTE - CHIEF COMPLAINT QUOTE
Pt brought in by EMS from [facility] for lower abdominal pain and vaginal bleeding with clots. Pt states she is approx 6 weeks pregnant and went through 3 pads today. c/o feeling weak.

## 2018-09-27 NOTE — ED ADULT NURSE NOTE - CHIEF COMPLAINT QUOTE
Pt brought in by EMS from [facility] for lower abdominal pain and vaginal bleeding with clots. Pt states she is approx 6 weeks pregnant and went through 3 pads today. c/o feeling weak.

## 2018-09-27 NOTE — ED PROVIDER NOTE - ATTENDING CONTRIBUTION TO CARE
I, Francisco Campbell MD, personally saw the patient with the resident, and completed the key components of the history and physical exam. I then discussed the management plan with the resident.    See chart

## 2018-09-27 NOTE — ED PROVIDER NOTE - MEDICAL DECISION MAKING DETAILS
pt w/ mild suprapubic TTP, refusing pelvic exam, will check labs and get US pt w/ mild suprapubic TTP, refusing pelvic exam. ddx includes ectopic pregnancy, ovarian cyst, UTI. will check labs and get US.

## 2018-09-27 NOTE — ED PROVIDER NOTE - OBJECTIVE STATEMENT
22 y/o F w/ Hx bipolar, depression, PTSD pw abdominal cramping and vaginal bleeding x 3 pads.  LMP approx 3 wks PTA.  Denies n/v, d/c, fever, chills, CP, SOB.  Notes generalized weakness and dizziness.

## 2018-09-27 NOTE — ED PROVIDER NOTE - PROGRESS NOTE DETAILS
Nely CÁRDENAS, PGY-4: pt declines pelvic exam. Labs/TVUS pending. Nely CÁRDENAS, PGY-4: pt declined pelvic exam. Labs/TVUS pending. Nely CÁRDENAS, PGY-4: pt refused TVUS. Hcg neg. Tender in RLQ on r/p abdominal exam. Will get CT to r/o appy. Nely CÁRDENAS, PGY-4: spoke with staff at [facility]. If pt is discharged, she should get an ambulette to:  [address] [facility], [facility] [address]LO[address] LAITH Bales: Pt was signed out to me to follow up CT scan and urine. Pt with negative CT scan.  Urine showing UTI.   Cystitis.   VSS, pt sleeping comfortably on stretcher. Still with mild tenderness to the suprapubic region, consistent with cystitis.   Plan dc back to [facility]. LAITH Bales: Pt was signed out to me to follow up CT scan and urine. Pt with negative CT scan.  Urine showing UTI. Pt still refusing pelvic examination and TVUS.   Likely Cystitis.   VSS, pt sleeping comfortably on stretcher. Still with mild tenderness to the suprapubic region, consistent with cystitis.   Plan dc back to [facility]. I, Francisco Campbell MD, personally saw the patient with ACP.  I have personally performed a face to face diagnostic evaluation on this patient.  I have reviewed the ACP note and agree with the history, exam, and plan of care, except as noted.

## 2018-09-27 NOTE — ED ADULT NURSE NOTE - OBJECTIVE STATEMENT
pt aox3; presents lethargic. complaining of abdominal cramps and bleeding.  reports LMP 3 weeks ago.  denies fever, chills. denies n,v,d. in no respiratory distress. abdomen soft nontender. refusing pelvic exam by MD. iv placed in right ac 20g, labs sent.   Will continue to monitor/assess

## 2018-11-13 NOTE — ED PROVIDER NOTE - PSH
14 y/o F with dog bite and questionable viability to flap.  spoke to plastics who agrees it is likely a bruise and would not hector with the wound.  Dr Hernández recommended to not change care for at least a few weeks to assess viability.  recommends warm compresses to encourage healing.  DC
S/P thyroid surgery

## 2018-11-14 NOTE — ED ADULT NURSE NOTE - PAIN: PRESENCE, MLM
Patient extubated following positive leak test per order. Placed on 3 l nasal cannula with Sa02 98%. Tolerated procedure well. complains of pain/discomfort

## 2018-11-16 ENCOUNTER — EMERGENCY (EMERGENCY)
Facility: HOSPITAL | Age: 23
LOS: 1 days | Discharge: HOME | End: 2018-11-16
Attending: EMERGENCY MEDICINE | Admitting: EMERGENCY MEDICINE
Payer: MEDICAID

## 2018-11-16 VITALS
TEMPERATURE: 99 F | SYSTOLIC BLOOD PRESSURE: 111 MMHG | DIASTOLIC BLOOD PRESSURE: 71 MMHG | RESPIRATION RATE: 18 BRPM | OXYGEN SATURATION: 100 % | HEART RATE: 82 BPM

## 2018-11-16 VITALS
TEMPERATURE: 98 F | HEART RATE: 75 BPM | SYSTOLIC BLOOD PRESSURE: 112 MMHG | RESPIRATION RATE: 16 BRPM | OXYGEN SATURATION: 99 % | DIASTOLIC BLOOD PRESSURE: 54 MMHG

## 2018-11-16 DIAGNOSIS — F43.10 POST-TRAUMATIC STRESS DISORDER, UNSPECIFIED: ICD-10-CM

## 2018-11-16 DIAGNOSIS — F60.3 BORDERLINE PERSONALITY DISORDER: ICD-10-CM

## 2018-11-16 DIAGNOSIS — Z90.49 ACQUIRED ABSENCE OF OTHER SPECIFIED PARTS OF DIGESTIVE TRACT: Chronic | ICD-10-CM

## 2018-11-16 DIAGNOSIS — F39 UNSPECIFIED MOOD [AFFECTIVE] DISORDER: ICD-10-CM

## 2018-11-16 DIAGNOSIS — F31.9 BIPOLAR DISORDER, UNSPECIFIED: ICD-10-CM

## 2018-11-16 LAB
ALBUMIN SERPL ELPH-MCNC: 4.3 G/DL — SIGNIFICANT CHANGE UP (ref 3.3–5)
ALP SERPL-CCNC: 101 U/L — SIGNIFICANT CHANGE UP (ref 40–120)
ALT FLD-CCNC: 9 U/L — SIGNIFICANT CHANGE UP (ref 4–33)
AMPHET UR-MCNC: NEGATIVE — SIGNIFICANT CHANGE UP
APAP SERPL-MCNC: < 15 UG/ML — LOW (ref 15–25)
AST SERPL-CCNC: 14 U/L — SIGNIFICANT CHANGE UP (ref 4–32)
BARBITURATES UR SCN-MCNC: NEGATIVE — SIGNIFICANT CHANGE UP
BASOPHILS # BLD AUTO: 0.03 K/UL — SIGNIFICANT CHANGE UP (ref 0–0.2)
BASOPHILS NFR BLD AUTO: 0.3 % — SIGNIFICANT CHANGE UP (ref 0–2)
BENZODIAZ UR-MCNC: NEGATIVE — SIGNIFICANT CHANGE UP
BILIRUB SERPL-MCNC: 0.5 MG/DL — SIGNIFICANT CHANGE UP (ref 0.2–1.2)
BUN SERPL-MCNC: 9 MG/DL — SIGNIFICANT CHANGE UP (ref 7–23)
CALCIUM SERPL-MCNC: 9.3 MG/DL — SIGNIFICANT CHANGE UP (ref 8.4–10.5)
CANNABINOIDS UR-MCNC: NEGATIVE — SIGNIFICANT CHANGE UP
CHLORIDE SERPL-SCNC: 103 MMOL/L — SIGNIFICANT CHANGE UP (ref 98–107)
CO2 SERPL-SCNC: 22 MMOL/L — SIGNIFICANT CHANGE UP (ref 22–31)
COCAINE METAB.OTHER UR-MCNC: NEGATIVE — SIGNIFICANT CHANGE UP
CREAT SERPL-MCNC: 0.61 MG/DL — SIGNIFICANT CHANGE UP (ref 0.5–1.3)
EOSINOPHIL # BLD AUTO: 0 K/UL — SIGNIFICANT CHANGE UP (ref 0–0.5)
EOSINOPHIL NFR BLD AUTO: 0 % — SIGNIFICANT CHANGE UP (ref 0–6)
ETHANOL BLD-MCNC: < 10 MG/DL — SIGNIFICANT CHANGE UP
GLUCOSE SERPL-MCNC: 99 MG/DL — SIGNIFICANT CHANGE UP (ref 70–99)
HCG SERPL-ACNC: < 5 MIU/ML — SIGNIFICANT CHANGE UP
HCT VFR BLD CALC: 40.1 % — SIGNIFICANT CHANGE UP (ref 34.5–45)
HGB BLD-MCNC: 12.7 G/DL — SIGNIFICANT CHANGE UP (ref 11.5–15.5)
IMM GRANULOCYTES # BLD AUTO: 0.05 # — SIGNIFICANT CHANGE UP
IMM GRANULOCYTES NFR BLD AUTO: 0.4 % — SIGNIFICANT CHANGE UP (ref 0–1.5)
LYMPHOCYTES # BLD AUTO: 17.9 % — SIGNIFICANT CHANGE UP (ref 13–44)
LYMPHOCYTES # BLD AUTO: 2.13 K/UL — SIGNIFICANT CHANGE UP (ref 1–3.3)
MCHC RBC-ENTMCNC: 26.1 PG — LOW (ref 27–34)
MCHC RBC-ENTMCNC: 31.7 % — LOW (ref 32–36)
MCV RBC AUTO: 82.5 FL — SIGNIFICANT CHANGE UP (ref 80–100)
METHADONE UR-MCNC: NEGATIVE — SIGNIFICANT CHANGE UP
MONOCYTES # BLD AUTO: 0.45 K/UL — SIGNIFICANT CHANGE UP (ref 0–0.9)
MONOCYTES NFR BLD AUTO: 3.8 % — SIGNIFICANT CHANGE UP (ref 2–14)
NEUTROPHILS # BLD AUTO: 9.26 K/UL — HIGH (ref 1.8–7.4)
NEUTROPHILS NFR BLD AUTO: 77.6 % — HIGH (ref 43–77)
NRBC # FLD: 0 — SIGNIFICANT CHANGE UP
OPIATES UR-MCNC: NEGATIVE — SIGNIFICANT CHANGE UP
OXYCODONE UR-MCNC: NEGATIVE — SIGNIFICANT CHANGE UP
PCP UR-MCNC: NEGATIVE — SIGNIFICANT CHANGE UP
PLATELET # BLD AUTO: 281 K/UL — SIGNIFICANT CHANGE UP (ref 150–400)
PMV BLD: 10.9 FL — SIGNIFICANT CHANGE UP (ref 7–13)
POTASSIUM SERPL-MCNC: 3.8 MMOL/L — SIGNIFICANT CHANGE UP (ref 3.5–5.3)
POTASSIUM SERPL-SCNC: 3.8 MMOL/L — SIGNIFICANT CHANGE UP (ref 3.5–5.3)
PROT SERPL-MCNC: 7 G/DL — SIGNIFICANT CHANGE UP (ref 6–8.3)
RBC # BLD: 4.86 M/UL — SIGNIFICANT CHANGE UP (ref 3.8–5.2)
RBC # FLD: 13.9 % — SIGNIFICANT CHANGE UP (ref 10.3–14.5)
SALICYLATES SERPL-MCNC: < 5 MG/DL — LOW (ref 15–30)
SODIUM SERPL-SCNC: 137 MMOL/L — SIGNIFICANT CHANGE UP (ref 135–145)
WBC # BLD: 11.92 K/UL — HIGH (ref 3.8–10.5)
WBC # FLD AUTO: 11.92 K/UL — HIGH (ref 3.8–10.5)

## 2018-11-16 PROCEDURE — 90792 PSYCH DIAG EVAL W/MED SRVCS: CPT | Mod: GC

## 2018-11-16 PROCEDURE — 99285 EMERGENCY DEPT VISIT HI MDM: CPT | Mod: 25

## 2018-11-16 RX ORDER — ACETAMINOPHEN 500 MG
650 TABLET ORAL ONCE
Qty: 0 | Refills: 0 | Status: COMPLETED | OUTPATIENT
Start: 2018-11-16 | End: 2018-11-16

## 2018-11-16 RX ADMIN — Medication 650 MILLIGRAM(S): at 11:45

## 2018-11-16 RX ADMIN — Medication 650 MILLIGRAM(S): at 02:39

## 2018-11-16 NOTE — ED BEHAVIORAL HEALTH ASSESSMENT NOTE - SUICIDE PROTECTIVE FACTORS
Identifies reasons for living/Future oriented/Engaged in work or school/Supportive social network or family/Positive therapeutic relationships

## 2018-11-16 NOTE — ED ADULT NURSE NOTE - NSIMPLEMENTINTERV_GEN_ALL_ED
Implemented All Universal Safety Interventions:  Feura Bush to call system. Call bell, personal items and telephone within reach. Instruct patient to call for assistance. Room bathroom lighting operational. Non-slip footwear when patient is off stretcher. Physically safe environment: no spills, clutter or unnecessary equipment. Stretcher in lowest position, wheels locked, appropriate side rails in place.
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
## 2018-11-16 NOTE — ED PROVIDER NOTE - NS ED ROS FT
GENERAL: No fever or chills, EYES: no change in vision, HEENT: no trouble swallowing or speaking, CARDIAC: no chest pain, PULMONARY: no cough or SOB, GI: no abdominal pain, no nausea, no vomiting, no diarrhea or constipation, : No changes in urination, SKIN: no rashes, NEURO: no headache,  MSK: skin cuts ~Dale Issa M.D. Resident

## 2018-11-16 NOTE — ED BEHAVIORAL HEALTH ASSESSMENT NOTE - DESCRIPTION
Vital Signs - Last 24 Hrs    T(C): 36.9 (11-16-18 @ 03:58), Max: 37.1 (11-16-18 @ 00:50)  HR: 75 (11-16-18 @ 03:58) (71 - 82)  BP: 112/54 (11-16-18 @ 03:58) (108/46 - 144/95)  RR: 16 (11-16-18 @ 03:58) (16 - 18)  SpO2: 99% (11-16-18 @ 03:58) (99% - 100%)  Wt(kg): --  Daily     Daily     I&O's Summary Pregnancy (current), obesity, asthma, seizures, gallstones, hypothyroidism. Patient has lived in foster care since the age of 7. She was in different foster homes until age 19 when she was placed in [facility] supportive housing.  Lives in [address] [place] [address] since May, 2017. Patient dropped out of school when she was in 10th grade. She is currently trying to get her GED. Has a boyfriend. Her father is in long-term.

## 2018-11-16 NOTE — ED ADULT NURSE REASSESSMENT NOTE - NS ED NURSE REASSESS COMMENT FT1
1045 Pt calm & cooperative d/c by MD pt verbalizing understanding of d/c instructions pt denies Si/Hi/AVh upon d/c.
Received report from night RN pt calm & cooperative currently denies Si/Hi/AVh presently safety & comfort measures maintained eval on going.
Break coverage RN : pt. received to  from the ED room 20. Pt. brought in for SI and harm to self. Pt. medically cleared brought to  for a psych eval. pt. denies any SI thoughts at this time. Belongings put away , pt. placed in  room 2 , Pt. Vitals as noted, and in no acute distress. Pt. denies any pain states she is feeling " weak" . Pt. in room sleeping.
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
## 2018-11-16 NOTE — ED BEHAVIORAL HEALTH ASSESSMENT NOTE - DESCRIPTION (FIRST USE, LAST USE, QUANTITY, FREQUENCY, DURATION)
Started age 22, currently uses 5 cigarettes/day There is a noted history of alcohol use disorder. Denies any recent use of alcohol. First use at the age of 13, noted history of cannabis use disorder. Denies recent use of cannabis.

## 2018-11-16 NOTE — ED PROVIDER NOTE - MEDICAL DECISION MAKING DETAILS
22 yo F bipolar, depression, hypothyroidism, not taking any medications p/w self cutting, will check basic labs, psych eval

## 2018-11-16 NOTE — ED BEHAVIORAL HEALTH ASSESSMENT NOTE - RISK ASSESSMENT
Chronic: History of SA, history of SIB, Cluster B pathology, hospitalization, trauma history  Acute: Impulsivity  Protective: Not suicidal, not homicidal, not manic, not psychotic, no active substance abuse, not male, help-seeking, actively engaged with treatment, future-oriented    Elevated risk by history (SA/SIB), impulsivity and chaotic psychosocial circumstances, but denies suicidality and is not an acute risk to herself at this time. Risk will be mitigated by continued engagement with treatment regimen.
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
## 2018-11-16 NOTE — ED PROVIDER NOTE - PROGRESS NOTE DETAILS
MD CHO:  I received s/o from Dr. Garza.  Pt pending [facility].  Per psych, pt safe for dc back to facility.
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
## 2018-11-16 NOTE — ED BEHAVIORAL HEALTH ASSESSMENT NOTE - PRIMARY DX
Adjustment disorder with disturbance of conduct Deferred condition on axis II Mood disorder Borderline personality disorder
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
## 2018-11-16 NOTE — ED BEHAVIORAL HEALTH ASSESSMENT NOTE - OTHER PAST PSYCHIATRIC HISTORY (INCLUDE DETAILS REGARDING ONSET, COURSE OF ILLNESS, INPATIENT/OUTPATIENT TREATMENT)
Patient has a prior diagnosis of bipolar disorder, alcohol use disorder, cannabis use disorder, and rule out borderline personality disorder and recent trauma treatment focus. She has a history of 10 prior inpatient psychiatric hospitalizations, most recently at [facility] in 11/2016 for suicidality.  Attends [facility] [place], Psychiatrist is Dr. Sherwood and therapist Ms. Sullivan.

## 2018-11-16 NOTE — ED PROVIDER NOTE - PHYSICAL EXAMINATION
Gen: AAOx3, non-toxic  Head: NCAT  HEENT: EOMI, oral mucosa moist, normal conjunctiva  Lung: CTAB, no respiratory distress, no wheezes/rhonchi/rales B/L, speaking in full sentences  CV: RRR, no murmurs, rubs or gallops  Abd: soft, NTND, no guarding  MSK: no visible deformities  Neuro: No focal sensory or motor deficits  Skin: Warm, well perfused, no rash, multiple superficial, linear skin lacerations to right forearm, bleeding controlled  Psych: normal affect.   ~Dale Issa M.D. Resident

## 2018-11-20 ENCOUNTER — EMERGENCY (EMERGENCY)
Facility: HOSPITAL | Age: 23
LOS: 1 days | Discharge: HOME | End: 2018-11-20
Attending: EMERGENCY MEDICINE | Admitting: EMERGENCY MEDICINE
Payer: MEDICAID

## 2018-11-20 VITALS
DIASTOLIC BLOOD PRESSURE: 54 MMHG | SYSTOLIC BLOOD PRESSURE: 101 MMHG | HEART RATE: 70 BPM | TEMPERATURE: 98 F | OXYGEN SATURATION: 99 % | RESPIRATION RATE: 18 BRPM

## 2018-11-20 VITALS
OXYGEN SATURATION: 100 % | DIASTOLIC BLOOD PRESSURE: 71 MMHG | TEMPERATURE: 98 F | HEART RATE: 88 BPM | SYSTOLIC BLOOD PRESSURE: 109 MMHG | RESPIRATION RATE: 16 BRPM

## 2018-11-20 DIAGNOSIS — Z90.49 ACQUIRED ABSENCE OF OTHER SPECIFIED PARTS OF DIGESTIVE TRACT: Chronic | ICD-10-CM

## 2018-11-20 LAB
APPEARANCE UR: CLEAR — SIGNIFICANT CHANGE UP
BILIRUB UR-MCNC: NEGATIVE — SIGNIFICANT CHANGE UP
BLOOD UR QL VISUAL: NEGATIVE — SIGNIFICANT CHANGE UP
COLOR SPEC: SIGNIFICANT CHANGE UP
GLUCOSE UR-MCNC: NEGATIVE — SIGNIFICANT CHANGE UP
KETONES UR-MCNC: NEGATIVE — SIGNIFICANT CHANGE UP
LEUKOCYTE ESTERASE UR-ACNC: NEGATIVE — SIGNIFICANT CHANGE UP
NITRITE UR-MCNC: NEGATIVE — SIGNIFICANT CHANGE UP
PH UR: 7.5 — SIGNIFICANT CHANGE UP (ref 5–8)
PROT UR-MCNC: NEGATIVE — SIGNIFICANT CHANGE UP
SP GR SPEC: 1.02 — SIGNIFICANT CHANGE UP (ref 1–1.04)
UROBILINOGEN FLD QL: NORMAL — SIGNIFICANT CHANGE UP

## 2018-11-20 PROCEDURE — 71046 X-RAY EXAM CHEST 2 VIEWS: CPT | Mod: 26

## 2018-11-20 PROCEDURE — 99283 EMERGENCY DEPT VISIT LOW MDM: CPT

## 2018-11-20 RX ORDER — ACETAMINOPHEN 500 MG
975 TABLET ORAL ONCE
Qty: 0 | Refills: 0 | Status: COMPLETED | OUTPATIENT
Start: 2018-11-20 | End: 2018-11-20

## 2018-11-20 RX ADMIN — Medication 975 MILLIGRAM(S): at 15:49

## 2018-11-20 NOTE — ED PROVIDER NOTE - NSFOLLOWUPINSTRUCTIONS_ED_ALL_ED_FT
Please follow up with your endocrinologist to adjust your thyroid medication. Your recent labwork indicated that this medication needs to be adjusted.    Take ibuprofen 400mg every 6 hours as needed for pain.    Please return if you have new or worsening chest pain, shortness of breath, are unable to tolerate food or liquids, or have worsening vomiting.

## 2018-11-20 NOTE — ED ADULT NURSE NOTE - CHIEF COMPLAINT QUOTE
Patient brought to [facility] from [facility] by EMS for c/o abdominal pain that she has had for a couple of days.

## 2018-11-20 NOTE — ED PROVIDER NOTE - NSFOLLOWUPCLINICS_GEN_ALL_ED_FT
[facility]  Endocrinology  [address] [place] [address]  Phone: [phone]  Fax:   Follow Up Time:

## 2018-11-20 NOTE — ED ADULT NURSE NOTE - OBJECTIVE STATEMENT
Pt received into intake A&Ox4, ambulatory C/O bilateral rib pain, worse with inspiration and suprapubic pain. MD evaluated, VS as noted. Urine labs sent, medicated as ordered. Pt awaiting XRAY results t this time. Will continue to monitor.

## 2018-11-20 NOTE — ED ADULT NURSE NOTE - NSIMPLEMENTINTERV_GEN_ALL_ED
Implemented All Universal Safety Interventions:  [place] to call system. Call bell, personal items and telephone within reach. Instruct patient to call for assistance. Room bathroom lighting operational. Non-slip footwear when patient is off stretcher. Physically safe environment: no spills, clutter or unnecessary equipment. Stretcher in lowest position, wheels locked, appropriate side rails in place.

## 2018-11-20 NOTE — ED ADULT TRIAGE NOTE - CHIEF COMPLAINT QUOTE
Patient brought to [facility] from [place] by EMS for c/o abdominal pain that she has had for a couple of days.

## 2018-11-20 NOTE — ED PROVIDER NOTE - OBJECTIVE STATEMENT
23 F , presenting due to rib pain x weeks, saying it "hurts to take a deep breath". Also having suprapubic pain that is intermittent, lasts for 1 to several hours and goes away by itself, for several days. Has had 2 episodes of NBNB vomiting, more like "spitting up" per pt, has been more tired than usual for 1 week. Last BM today was diarrhea. Reports she was recently treated for a UTI and given a "cream" and antibiotics (chart review shows UTI tx in Sept). Per pt is not on any psychiatric medications.  Denies abnormal vaginal bleeding or discharge, blood in stool or urine, f/c, palpitations, dysuria, excessive thirst, cough, SoB  LMP , was normal period per pt. Sexually active not using contraception.

## 2018-11-20 NOTE — ED PROVIDER NOTE - PHYSICAL EXAMINATION
General: Alert and Oriented. No apparent distress.  Head: Normocephalic and atraumatic.  Eyes: PERRLA with EOMI.  Neck: Supple. Trachea midline.   Cardiac: Normal S1 and S2 w/ RRR. No murmurs appreciated.   Pulmonary: Vesicular breath sounds bilaterally. No increased WOB. No wheezes or crackles.  Abdominal: Soft, TTP over epigastrium and suprapubic area. No guarding/rebound. Normoactive bowel sounds. R CVA tenderness.  Neurologic: No focal sensory or motor deficits.  Musculoskeletal: Strength appropriate in all 4 extremities for age with no limited ROM.  No TTP over ribs on the right side.  Skin: Color appropriate for race. Intact, warm, and well-perfused.  Psychiatric: Appropriate mood and affect. No apparent risk to self or others.

## 2018-11-20 NOTE — ED PROVIDER NOTE - MEDICAL DECISION MAKING DETAILS
23 F with bipolar disorder, hypothyroidism, c/o rib pain and abdominal pain. On exam has CVA and ABD tenderness. Ddx is wide including , GI and MSK pathologies for both the ABD and rib pain. Suprapubic tenderness, urinary frequency, and CVA tenderness indicate a more likely  pathology. Pt will need Plan: Urine preg/UA, CXR, analgesia.

## 2018-11-20 NOTE — ED PROVIDER NOTE - ATTENDING CONTRIBUTION TO CARE
I performed a face-to-face evaluation of the patient and performed a history and physical examination. I agree with the history and physical examination.    23 F, bipolar, was not preg. when seen here a few days ago, p/w suprapubic pain. No F or CVAT. No VB or discharge. Recent STD testing neg. Appears well. NAD. Afebrile. Vital signs unremarkable. Abd. NT/ND. Also, point tenderness superficial at R lower rib w/o trauma or rash. Eval for UTI.

## 2018-11-23 ENCOUNTER — EMERGENCY (EMERGENCY)
Facility: HOSPITAL | Age: 23
LOS: 1 days | Discharge: HOME | End: 2018-11-23
Admitting: EMERGENCY MEDICINE
Payer: MEDICAID

## 2018-11-23 VITALS
SYSTOLIC BLOOD PRESSURE: 114 MMHG | HEART RATE: 77 BPM | OXYGEN SATURATION: 100 % | RESPIRATION RATE: 18 BRPM | DIASTOLIC BLOOD PRESSURE: 71 MMHG | TEMPERATURE: 99 F

## 2018-11-23 DIAGNOSIS — Z90.49 ACQUIRED ABSENCE OF OTHER SPECIFIED PARTS OF DIGESTIVE TRACT: Chronic | ICD-10-CM

## 2018-11-23 LAB
ALBUMIN SERPL ELPH-MCNC: 4.3 G/DL — SIGNIFICANT CHANGE UP (ref 3.3–5)
ALP SERPL-CCNC: 99 U/L — SIGNIFICANT CHANGE UP (ref 40–120)
ALT FLD-CCNC: 10 U/L — SIGNIFICANT CHANGE UP (ref 4–33)
AMPHET UR-MCNC: NEGATIVE — SIGNIFICANT CHANGE UP
APAP SERPL-MCNC: < 15 UG/ML — LOW (ref 15–25)
APPEARANCE UR: CLEAR — SIGNIFICANT CHANGE UP
AST SERPL-CCNC: 13 U/L — SIGNIFICANT CHANGE UP (ref 4–32)
BACTERIA # UR AUTO: NEGATIVE — SIGNIFICANT CHANGE UP
BACTERIA UR CULT: SIGNIFICANT CHANGE UP
BARBITURATES UR SCN-MCNC: NEGATIVE — SIGNIFICANT CHANGE UP
BASOPHILS # BLD AUTO: 0.03 K/UL — SIGNIFICANT CHANGE UP (ref 0–0.2)
BASOPHILS NFR BLD AUTO: 0.3 % — SIGNIFICANT CHANGE UP (ref 0–2)
BENZODIAZ UR-MCNC: NEGATIVE — SIGNIFICANT CHANGE UP
BILIRUB SERPL-MCNC: 0.7 MG/DL — SIGNIFICANT CHANGE UP (ref 0.2–1.2)
BILIRUB UR-MCNC: NEGATIVE — SIGNIFICANT CHANGE UP
BLOOD UR QL VISUAL: NEGATIVE — SIGNIFICANT CHANGE UP
BUN SERPL-MCNC: 9 MG/DL — SIGNIFICANT CHANGE UP (ref 7–23)
CALCIUM SERPL-MCNC: 9 MG/DL — SIGNIFICANT CHANGE UP (ref 8.4–10.5)
CANNABINOIDS UR-MCNC: NEGATIVE — SIGNIFICANT CHANGE UP
CHLORIDE SERPL-SCNC: 104 MMOL/L — SIGNIFICANT CHANGE UP (ref 98–107)
CO2 SERPL-SCNC: 23 MMOL/L — SIGNIFICANT CHANGE UP (ref 22–31)
COCAINE METAB.OTHER UR-MCNC: NEGATIVE — SIGNIFICANT CHANGE UP
COLOR SPEC: SIGNIFICANT CHANGE UP
CREAT SERPL-MCNC: 0.61 MG/DL — SIGNIFICANT CHANGE UP (ref 0.5–1.3)
EOSINOPHIL # BLD AUTO: 0 K/UL — SIGNIFICANT CHANGE UP (ref 0–0.5)
EOSINOPHIL NFR BLD AUTO: 0 % — SIGNIFICANT CHANGE UP (ref 0–6)
ETHANOL BLD-MCNC: < 10 MG/DL — SIGNIFICANT CHANGE UP
GLUCOSE SERPL-MCNC: 100 MG/DL — HIGH (ref 70–99)
GLUCOSE UR-MCNC: NEGATIVE — SIGNIFICANT CHANGE UP
HCG SERPL-ACNC: 99.26 MIU/ML — SIGNIFICANT CHANGE UP
HCT VFR BLD CALC: 41.2 % — SIGNIFICANT CHANGE UP (ref 34.5–45)
HGB BLD-MCNC: 13.2 G/DL — SIGNIFICANT CHANGE UP (ref 11.5–15.5)
HYALINE CASTS # UR AUTO: NEGATIVE — SIGNIFICANT CHANGE UP
IMM GRANULOCYTES # BLD AUTO: 0.04 # — SIGNIFICANT CHANGE UP
IMM GRANULOCYTES NFR BLD AUTO: 0.4 % — SIGNIFICANT CHANGE UP (ref 0–1.5)
KETONES UR-MCNC: NEGATIVE — SIGNIFICANT CHANGE UP
LEUKOCYTE ESTERASE UR-ACNC: SIGNIFICANT CHANGE UP
LYMPHOCYTES # BLD AUTO: 1.67 K/UL — SIGNIFICANT CHANGE UP (ref 1–3.3)
LYMPHOCYTES # BLD AUTO: 16.3 % — SIGNIFICANT CHANGE UP (ref 13–44)
MCHC RBC-ENTMCNC: 26.3 PG — LOW (ref 27–34)
MCHC RBC-ENTMCNC: 32 % — SIGNIFICANT CHANGE UP (ref 32–36)
MCV RBC AUTO: 82.2 FL — SIGNIFICANT CHANGE UP (ref 80–100)
METHADONE UR-MCNC: NEGATIVE — SIGNIFICANT CHANGE UP
MONOCYTES # BLD AUTO: 0.46 K/UL — SIGNIFICANT CHANGE UP (ref 0–0.9)
MONOCYTES NFR BLD AUTO: 4.5 % — SIGNIFICANT CHANGE UP (ref 2–14)
NEUTROPHILS # BLD AUTO: 8.06 K/UL — HIGH (ref 1.8–7.4)
NEUTROPHILS NFR BLD AUTO: 78.5 % — HIGH (ref 43–77)
NITRITE UR-MCNC: NEGATIVE — SIGNIFICANT CHANGE UP
NRBC # FLD: 0 — SIGNIFICANT CHANGE UP
OPIATES UR-MCNC: NEGATIVE — SIGNIFICANT CHANGE UP
OXYCODONE UR-MCNC: NEGATIVE — SIGNIFICANT CHANGE UP
PCP UR-MCNC: NEGATIVE — SIGNIFICANT CHANGE UP
PH UR: 7 — SIGNIFICANT CHANGE UP (ref 5–8)
PLATELET # BLD AUTO: 298 K/UL — SIGNIFICANT CHANGE UP (ref 150–400)
PMV BLD: 10 FL — SIGNIFICANT CHANGE UP (ref 7–13)
POTASSIUM SERPL-MCNC: 4.2 MMOL/L — SIGNIFICANT CHANGE UP (ref 3.5–5.3)
POTASSIUM SERPL-SCNC: 4.2 MMOL/L — SIGNIFICANT CHANGE UP (ref 3.5–5.3)
PROT SERPL-MCNC: 7.4 G/DL — SIGNIFICANT CHANGE UP (ref 6–8.3)
PROT UR-MCNC: NEGATIVE — SIGNIFICANT CHANGE UP
RBC # BLD: 5.01 M/UL — SIGNIFICANT CHANGE UP (ref 3.8–5.2)
RBC # FLD: 14.1 % — SIGNIFICANT CHANGE UP (ref 10.3–14.5)
RBC CASTS # UR COMP ASSIST: SIGNIFICANT CHANGE UP (ref 0–?)
SALICYLATES SERPL-MCNC: < 5 MG/DL — LOW (ref 15–30)
SODIUM SERPL-SCNC: 138 MMOL/L — SIGNIFICANT CHANGE UP (ref 135–145)
SP GR SPEC: 1.02 — SIGNIFICANT CHANGE UP (ref 1–1.04)
SPECIMEN SOURCE: SIGNIFICANT CHANGE UP
SQUAMOUS # UR AUTO: SIGNIFICANT CHANGE UP
TSH SERPL-MCNC: 26.08 UIU/ML — HIGH (ref 0.27–4.2)
UROBILINOGEN FLD QL: NORMAL — SIGNIFICANT CHANGE UP
WBC # BLD: 10.26 K/UL — SIGNIFICANT CHANGE UP (ref 3.8–10.5)
WBC # FLD AUTO: 10.26 K/UL — SIGNIFICANT CHANGE UP (ref 3.8–10.5)
WBC UR QL: HIGH (ref 0–?)

## 2018-11-23 PROCEDURE — 99285 EMERGENCY DEPT VISIT HI MDM: CPT

## 2018-11-23 PROCEDURE — 90792 PSYCH DIAG EVAL W/MED SRVCS: CPT | Mod: GC

## 2018-11-23 NOTE — ED BEHAVIORAL HEALTH ASSESSMENT NOTE - BODY HABITUS
Ears: no ear pain and no hearing problems.Nose: no nasal congestion and no nasal drainage.Mouth/Throat: no dysphagia, no hoarseness and no throat pain.Neck: no lumps, no pain, no stiffness and no swollen glands. Well nourished
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
## 2018-11-23 NOTE — ED ADULT NURSE REASSESSMENT NOTE - NS ED NURSE REASSESS COMMENT FT1
Pt calm & cooperative d/c by NP pt verbalizing understanding of d/c instructions pt denies Si/Hi/AVh presently resources provided by NP.
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
## 2018-11-23 NOTE — ED BEHAVIORAL HEALTH NOTE - BEHAVIORAL HEALTH NOTE
Pt evaluated and cleared for discharge. Writer contacted JEREMY Barron at [facility] ([phone]) to notify of discharge. Discharge transportation arranged via [facility] #[phone]. Pt to be picked up by [address] [facility] (#[phone]) with invoice #[id number]. ETA is 3:15pm.

## 2018-11-23 NOTE — ED PROVIDER NOTE - NS ED MD EM SELECTION
Per Leydi Johnson & Dr. Esquivel's office notes, New order for Levothyroxine 100 mcg.  TSH reordered & lab appt made.     [address] Comprehensive
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
## 2018-11-23 NOTE — ED BEHAVIORAL HEALTH ASSESSMENT NOTE - OTHER PAST PSYCHIATRIC HISTORY (INCLUDE DETAILS REGARDING ONSET, COURSE OF ILLNESS, INPATIENT/OUTPATIENT TREATMENT)
Patient has a prior diagnosis of bipolar disorder, alcohol use disorder, cannabis use disorder, and rule out borderline personality disorder and recent trauma treatment focus. She has a history of 10 prior inpatient psychiatric hospitalizations, most recently at [facility] in 11/2016 for suicidality.  Attends [facility], Psychiatrist is Dr. Sherwood and therapist Ms. Sullivan.

## 2018-11-23 NOTE — ED ADULT NURSE NOTE - NSIMPLEMENTINTERV_GEN_ALL_ED
Implemented All Universal Safety Interventions:  [place] to call system. Call bell, personal items and telephone within reach. Instruct patient to call for assistance. Room bathroom lighting operational. Non-slip footwear when patient is off stretcher. Physically safe environment: no spills, clutter or unnecessary equipment. Stretcher in lowest position, wheels locked, appropriate side rails in place.

## 2018-11-23 NOTE — ED PROVIDER NOTE - MEDICAL DECISION MAKING DETAILS
24 y/o female pt BIB EMS from [facility] suicide attempt.  Medical evaluation completed.  Superficial wounds to RUE cleaned and bacitracin applied.  No other medical issues/problems requiring immediate interventions.  Psychiatric consult requested. 22 y/o female pt BIB EMS from [facility] suicide attempt.  Medical evaluation completed.  Superficial wounds to RUE cleaned and bacitracin applied.  No other medical issues/problems requiring immediate interventions.  Psychiatric consult requested and recommended discharge and pt will f/u with her outpatient psychiatric care provider at [facility].  Pt is not actively suicidal. 22 y/o female pt BIB EMS from [facility] suicide attempt.  Medical evaluation completed.  Superficial wounds to RUE cleaned and bacitracin applied.  No other medical issues/problems requiring immediate interventions.  Psychiatric consult requested and recommended discharge and pt will f/u with her outpatient psychiatric care provider at [facility].  Pt is not actively suicidal.  Pt provided with "suicidal feelings: How to help yourself" information with discharge paperwork.
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
## 2018-11-23 NOTE — ED BEHAVIORAL HEALTH ASSESSMENT NOTE - REFERRAL / APPOINTMENT DETAILS
follow up with outpatient provider at [facility] follow up with outpatient provider at [facility]

## 2018-11-23 NOTE — ED BEHAVIORAL HEALTH NOTE - BEHAVIORAL HEALTH NOTE
Writer notified pt of d/c transportation arrangements and confirmed address. Pt was at [facility] in the past but [facility]. [facility] address is [address]. [address] contacted and notified of change.

## 2018-11-23 NOTE — ED BEHAVIORAL HEALTH ASSESSMENT NOTE - SAFETY PLAN DETAILS
Patient will call 911 should she have suicidal thoughts or feel unsafe at home. Patient also instructed to call suicide prevention lifeline [phone]
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
## 2018-12-01 ENCOUNTER — OUTPATIENT (OUTPATIENT)
Dept: OUTPATIENT SERVICES | Facility: HOSPITAL | Age: 23
LOS: 1 days | End: 2018-12-01
Payer: MEDICAID

## 2018-12-01 DIAGNOSIS — Z90.49 ACQUIRED ABSENCE OF OTHER SPECIFIED PARTS OF DIGESTIVE TRACT: Chronic | ICD-10-CM

## 2018-12-01 PROCEDURE — G9001: CPT

## 2018-12-05 ENCOUNTER — EMERGENCY (EMERGENCY)
Facility: HOSPITAL | Age: 23
LOS: 1 days | Discharge: HOME | End: 2018-12-05
Admitting: EMERGENCY MEDICINE
Payer: MEDICAID

## 2018-12-05 VITALS
DIASTOLIC BLOOD PRESSURE: 66 MMHG | HEART RATE: 69 BPM | SYSTOLIC BLOOD PRESSURE: 125 MMHG | OXYGEN SATURATION: 100 % | RESPIRATION RATE: 16 BRPM | TEMPERATURE: 98 F

## 2018-12-05 VITALS
HEART RATE: 84 BPM | OXYGEN SATURATION: 100 % | DIASTOLIC BLOOD PRESSURE: 58 MMHG | TEMPERATURE: 99 F | SYSTOLIC BLOOD PRESSURE: 116 MMHG | RESPIRATION RATE: 18 BRPM

## 2018-12-05 DIAGNOSIS — Z90.49 ACQUIRED ABSENCE OF OTHER SPECIFIED PARTS OF DIGESTIVE TRACT: Chronic | ICD-10-CM

## 2018-12-05 LAB
ALBUMIN SERPL ELPH-MCNC: 4.1 G/DL — SIGNIFICANT CHANGE UP (ref 3.3–5)
ALP SERPL-CCNC: 87 U/L — SIGNIFICANT CHANGE UP (ref 40–120)
ALT FLD-CCNC: 8 U/L — SIGNIFICANT CHANGE UP (ref 4–33)
APPEARANCE UR: SIGNIFICANT CHANGE UP
AST SERPL-CCNC: 13 U/L — SIGNIFICANT CHANGE UP (ref 4–32)
BACTERIA # UR AUTO: NEGATIVE — SIGNIFICANT CHANGE UP
BASOPHILS # BLD AUTO: 0.03 K/UL — SIGNIFICANT CHANGE UP (ref 0–0.2)
BASOPHILS NFR BLD AUTO: 0.4 % — SIGNIFICANT CHANGE UP (ref 0–2)
BILIRUB SERPL-MCNC: 1.2 MG/DL — SIGNIFICANT CHANGE UP (ref 0.2–1.2)
BILIRUB UR-MCNC: NEGATIVE — SIGNIFICANT CHANGE UP
BLOOD UR QL VISUAL: NEGATIVE — SIGNIFICANT CHANGE UP
BUN SERPL-MCNC: 7 MG/DL — SIGNIFICANT CHANGE UP (ref 7–23)
CALCIUM SERPL-MCNC: 8.8 MG/DL — SIGNIFICANT CHANGE UP (ref 8.4–10.5)
CHLORIDE SERPL-SCNC: 105 MMOL/L — SIGNIFICANT CHANGE UP (ref 98–107)
CO2 SERPL-SCNC: 20 MMOL/L — LOW (ref 22–31)
COLOR SPEC: YELLOW — SIGNIFICANT CHANGE UP
CREAT SERPL-MCNC: 0.55 MG/DL — SIGNIFICANT CHANGE UP (ref 0.5–1.3)
EOSINOPHIL # BLD AUTO: 0 K/UL — SIGNIFICANT CHANGE UP (ref 0–0.5)
EOSINOPHIL NFR BLD AUTO: 0 % — SIGNIFICANT CHANGE UP (ref 0–6)
GLUCOSE SERPL-MCNC: 92 MG/DL — SIGNIFICANT CHANGE UP (ref 70–99)
GLUCOSE UR-MCNC: NEGATIVE — SIGNIFICANT CHANGE UP
HCG SERPL-ACNC: 7707 MIU/ML — SIGNIFICANT CHANGE UP
HCT VFR BLD CALC: 36.8 % — SIGNIFICANT CHANGE UP (ref 34.5–45)
HGB BLD-MCNC: 11.6 G/DL — SIGNIFICANT CHANGE UP (ref 11.5–15.5)
HYALINE CASTS # UR AUTO: NEGATIVE — SIGNIFICANT CHANGE UP
IMM GRANULOCYTES # BLD AUTO: 0.03 # — SIGNIFICANT CHANGE UP
IMM GRANULOCYTES NFR BLD AUTO: 0.4 % — SIGNIFICANT CHANGE UP (ref 0–1.5)
KETONES UR-MCNC: NEGATIVE — SIGNIFICANT CHANGE UP
LEUKOCYTE ESTERASE UR-ACNC: SIGNIFICANT CHANGE UP
LYMPHOCYTES # BLD AUTO: 1.55 K/UL — SIGNIFICANT CHANGE UP (ref 1–3.3)
LYMPHOCYTES # BLD AUTO: 21.1 % — SIGNIFICANT CHANGE UP (ref 13–44)
MCHC RBC-ENTMCNC: 26 PG — LOW (ref 27–34)
MCHC RBC-ENTMCNC: 31.5 % — LOW (ref 32–36)
MCV RBC AUTO: 82.3 FL — SIGNIFICANT CHANGE UP (ref 80–100)
MONOCYTES # BLD AUTO: 0.39 K/UL — SIGNIFICANT CHANGE UP (ref 0–0.9)
MONOCYTES NFR BLD AUTO: 5.3 % — SIGNIFICANT CHANGE UP (ref 2–14)
NEUTROPHILS # BLD AUTO: 5.34 K/UL — SIGNIFICANT CHANGE UP (ref 1.8–7.4)
NEUTROPHILS NFR BLD AUTO: 72.8 % — SIGNIFICANT CHANGE UP (ref 43–77)
NITRITE UR-MCNC: NEGATIVE — SIGNIFICANT CHANGE UP
NRBC # FLD: 0 — SIGNIFICANT CHANGE UP
PH UR: 6.5 — SIGNIFICANT CHANGE UP (ref 5–8)
PLATELET # BLD AUTO: 300 K/UL — SIGNIFICANT CHANGE UP (ref 150–400)
PMV BLD: 10 FL — SIGNIFICANT CHANGE UP (ref 7–13)
POTASSIUM SERPL-MCNC: 3.8 MMOL/L — SIGNIFICANT CHANGE UP (ref 3.5–5.3)
POTASSIUM SERPL-SCNC: 3.8 MMOL/L — SIGNIFICANT CHANGE UP (ref 3.5–5.3)
PROT SERPL-MCNC: 7 G/DL — SIGNIFICANT CHANGE UP (ref 6–8.3)
PROT UR-MCNC: 20 — SIGNIFICANT CHANGE UP
RBC # BLD: 4.47 M/UL — SIGNIFICANT CHANGE UP (ref 3.8–5.2)
RBC # FLD: 13.7 % — SIGNIFICANT CHANGE UP (ref 10.3–14.5)
RBC CASTS # UR COMP ASSIST: SIGNIFICANT CHANGE UP (ref 0–?)
SODIUM SERPL-SCNC: 138 MMOL/L — SIGNIFICANT CHANGE UP (ref 135–145)
SP GR SPEC: 1.03 — SIGNIFICANT CHANGE UP (ref 1–1.04)
SQUAMOUS # UR AUTO: SIGNIFICANT CHANGE UP
UROBILINOGEN FLD QL: NORMAL — SIGNIFICANT CHANGE UP
WBC # BLD: 7.34 K/UL — SIGNIFICANT CHANGE UP (ref 3.8–10.5)
WBC # FLD AUTO: 7.34 K/UL — SIGNIFICANT CHANGE UP (ref 3.8–10.5)
WBC UR QL: SIGNIFICANT CHANGE UP (ref 0–?)

## 2018-12-05 PROCEDURE — 99285 EMERGENCY DEPT VISIT HI MDM: CPT | Mod: 25

## 2018-12-05 PROCEDURE — 76815 OB US LIMITED FETUS(S): CPT | Mod: 26

## 2018-12-05 PROCEDURE — 76801 OB US < 14 WKS SINGLE FETUS: CPT | Mod: 26

## 2018-12-05 RX ORDER — CEPHALEXIN 500 MG
500 CAPSULE ORAL ONCE
Qty: 0 | Refills: 0 | Status: COMPLETED | OUTPATIENT
Start: 2018-12-05 | End: 2018-12-05

## 2018-12-05 RX ORDER — SODIUM CHLORIDE 9 MG/ML
1000 INJECTION INTRAMUSCULAR; INTRAVENOUS; SUBCUTANEOUS ONCE
Qty: 0 | Refills: 0 | Status: COMPLETED | OUTPATIENT
Start: 2018-12-05 | End: 2018-12-05

## 2018-12-05 RX ORDER — CEPHALEXIN 500 MG
500 CAPSULE ORAL EVERY 12 HOURS
Qty: 0 | Refills: 0 | Status: DISCONTINUED | OUTPATIENT
Start: 2018-12-05 | End: 2018-12-05

## 2018-12-05 RX ORDER — ONDANSETRON 8 MG/1
4 TABLET, FILM COATED ORAL ONCE
Qty: 0 | Refills: 0 | Status: COMPLETED | OUTPATIENT
Start: 2018-12-05 | End: 2018-12-05

## 2018-12-05 RX ORDER — CEPHALEXIN 500 MG
1 CAPSULE ORAL
Qty: 14 | Refills: 0 | OUTPATIENT
Start: 2018-12-05 | End: 2018-12-11

## 2018-12-05 RX ADMIN — ONDANSETRON 4 MILLIGRAM(S): 8 TABLET, FILM COATED ORAL at 11:45

## 2018-12-05 RX ADMIN — Medication 500 MILLIGRAM(S): at 16:39

## 2018-12-05 RX ADMIN — SODIUM CHLORIDE 1000 MILLILITER(S): 9 INJECTION INTRAMUSCULAR; INTRAVENOUS; SUBCUTANEOUS at 11:51

## 2018-12-05 NOTE — ED PROVIDER NOTE - PROGRESS NOTE DETAILS
LAITH Bucio: Spoke with GYN, as there is intrauterine gestational sac pt does not need to be on beta list even though there is no yolk sac. Spoke with , pt has an outpt GYN with an appointment on Monday, she can f/u then but should have a beta checked in 48 hours and return with any pain LAITH Bucio: Discussed with pt that she needs to return to either the ER or her OB in 48 hours (on Friday) to have a repeat beta level drawn. pt states she will likely return to the ER as she has an appointment with her OB for Monday> Pt continues to deny abdominal or pelvic pain, non-tender abdomen. Given strict return precautions

## 2018-12-05 NOTE — ED ADULT NURSE NOTE - OBJECTIVE STATEMENT
Patient reports dysuria since yesterday. Patient also reports weakness and N/V. Patient reports being 9 weeks pregnant, scheduled for appointment with OB on Monday. Patient is . Patient denies any hematuria, syncope or chest pain. Patient has scars on her b/l forearms. Patient reports performing self-injury years ago, patient denies any current suicidal thoughts. Patient's labs drawn, IV #18 placed into right AC. IVF infusing without difficulty. Patient pending results.

## 2018-12-05 NOTE — ED ADULT TRIAGE NOTE - PRO INTERPRETER NEED 2
Anesthesia Evaluation     Patient summary reviewed and Nursing notes reviewed   NPO Solid Status: > 8 hours  NPO Liquid Status: > 8 hours           Airway   Dental      Pulmonary    (+) a smoker Former, sleep apnea,   Cardiovascular   Exercise tolerance: good (4-7 METS)    Patient on routine beta blocker and Beta blocker given within 24 hours of surgery    (+) pacemaker pacemaker, ICD, hypertension, CAD, hyperlipidemia,       Neuro/Psych  (+) psychiatric history,     GI/Hepatic/Renal/Endo    (+)  hiatal hernia, GERD, GI bleeding, diabetes mellitus,     Musculoskeletal     Abdominal    Substance History      OB/GYN          Other        ROS/Med Hx Other: [place]                Anesthesia Plan    ASA 3     MAC     intravenous induction   Anesthetic plan and risks discussed with patient.       English

## 2018-12-05 NOTE — ED ADULT NURSE NOTE - CHPI ED NUR SYMPTOMS NEG
no abdominal pain/no discharge/no back pain/no pain/no vomiting/no vaginal discharge/no nausea/no fever/no coffee grounds emesis

## 2018-12-05 NOTE — ED PROVIDER NOTE - NSFOLLOWUPINSTRUCTIONS_ED_ALL_ED_FT
Follow up with your GYN in 48 hours to have a repeat beta hcg, bring copies of your results (you have an appointment scheduled already for Monday 12/10 for repeat imaging)  Return to the ER with any worsening or concerning symptoms, pelvic pain, vaginal bleeding, lightheadedness, dizziness or any other concerns

## 2018-12-05 NOTE — ED PROCEDURE NOTE - PROCEDURE ADDITIONAL DETAILS
77080, Ultrasound, transabdominal, pregnancy, limited    Focused ED ultrasound transabdominal OB to evaluate for Intrauterine Pregnancy:    Indication: dysuria, pelvic pain    Findings: Gestation sac, no definitive IUP. No free fluid.    impression: No definitive IUP    Procedure note and images placed in chart

## 2018-12-05 NOTE — ED ADULT TRIAGE NOTE - CHIEF COMPLAINT QUOTE
Arrives via EMS with c/o burning on urination, since last night.  As per pt  LMP10/2/18, and is 9 weeks pregnant.  Denies vaginal bleeding

## 2018-12-05 NOTE — ED PROVIDER NOTE - OBJECTIVE STATEMENT
23yF w/pmhx  at 9 weeks gestation  with borderline personality disorder from [facility] presenting with dysuria. Pt states yesterday she developed dysuria with associated nausea and vomiting. Last week she had an US which confirmed IUP. Pt denies fever/chills, abdominal or pelvic pain, vaginal bleeding or discharge, chest pain, shortness of breath, recent travel or illness, leg pain or swelling or any other concerns

## 2018-12-05 NOTE — ED ADULT NURSE NOTE - NSIMPLEMENTINTERV_GEN_ALL_ED
Implemented All Universal Safety Interventions:  [place] to call system. Call bell, personal items and telephone within reach. Instruct patient to call for assistance. Room bathroom lighting operational. Non-slip footwear when patient is off stretcher. Physically safe environment: no spills, clutter or unnecessary equipment. Stretcher in lowest position, wheels locked, appropriate side rails in place.

## 2018-12-06 LAB
BACTERIA UR CULT: SIGNIFICANT CHANGE UP
SPECIMEN SOURCE: SIGNIFICANT CHANGE UP

## 2018-12-07 ENCOUNTER — EMERGENCY (EMERGENCY)
Facility: HOSPITAL | Age: 23
LOS: 1 days | Discharge: HOME | End: 2018-12-07
Attending: EMERGENCY MEDICINE | Admitting: EMERGENCY MEDICINE
Payer: MEDICAID

## 2018-12-07 VITALS
TEMPERATURE: 99 F | SYSTOLIC BLOOD PRESSURE: 109 MMHG | OXYGEN SATURATION: 100 % | RESPIRATION RATE: 18 BRPM | HEART RATE: 73 BPM | DIASTOLIC BLOOD PRESSURE: 50 MMHG

## 2018-12-07 VITALS
HEART RATE: 78 BPM | RESPIRATION RATE: 18 BRPM | TEMPERATURE: 99 F | DIASTOLIC BLOOD PRESSURE: 59 MMHG | SYSTOLIC BLOOD PRESSURE: 132 MMHG

## 2018-12-07 DIAGNOSIS — Z90.49 ACQUIRED ABSENCE OF OTHER SPECIFIED PARTS OF DIGESTIVE TRACT: Chronic | ICD-10-CM

## 2018-12-07 LAB — HCG SERPL-ACNC: SIGNIFICANT CHANGE UP MIU/ML

## 2018-12-07 PROCEDURE — 76830 TRANSVAGINAL US NON-OB: CPT | Mod: 26

## 2018-12-07 PROCEDURE — 99283 EMERGENCY DEPT VISIT LOW MDM: CPT

## 2018-12-07 NOTE — ED PROVIDER NOTE - PROGRESS NOTE DETAILS
Cabot: Have called the lab x 4 re: hcg result.  Have been told each time that it is running. Cabot: Given < doubling of hcg, will obtain TVUS. Georgette : Toño shows developing IUP, copies of results given to patient, has OBGYN appointment for f/u.

## 2018-12-07 NOTE — ED PROVIDER NOTE - PHYSICAL EXAMINATION
HDS, NAD, MMM, eyes clear, lungs CTAB, heart sounds normal, abd soft, NT, ND, no CVAT, LEs without edema, wwp, skin normal temperature and color, neuro: alert and oriented, no focal deficits

## 2018-12-07 NOTE — ED PROVIDER NOTE - ATTENDING CONTRIBUTION TO CARE
I, Jennifer Cabot, MD, have performed a history and physical exam of the patient and discussed their management with the ACP.  I reviewed the PA's note and agree with the documented findings and plan of care. My medical decision making and observations are found above.    Cabot: 23F with PMH of BPH, prior ectopic, now 9 weeks pregnant, here with dysuria 2d ago, treated for UTI with keflex, back for repeat hcg.  She had an US showing a gestational sac only, OB was called, stated that she did not need to be on the beta list, and patient was advised to return to her gyn for a repeat hcg in 48 hours.  She is already scheduled for a repeat US on Monday 12/10.  She denies F/C, abd pain, urinary sx, vaginal bleeding.  Exam normal.  Will repeat b-hcg.  If >2x 7700, will refer to her gyn for repeat US as scheduled on Monday, if less, will repeat US here.

## 2018-12-07 NOTE — ED PROVIDER NOTE - OBJECTIVE STATEMENT
Cabot: 23F with PMH of BPH, prior ectopic, now 9 weeks pregnant, here with dysuria 2d ago, treated for UTI with keflex, back for repeat hcg.  She had an US showing a gestational sac only, OB was called, stated that she did not need to be on the beta list, and patient was advised to return to her gyn for a repeat hcg in 48 hours.  She is already scheduled for a repeat US on Monday 12/10.  She denies F/C, abd pain, urinary sx, vaginal bleeding.

## 2018-12-07 NOTE — ED PROVIDER NOTE - MEDICAL DECISION MAKING DETAILS
Cabot: 23F with PMH of BPH, prior ectopic, now 9 weeks pregnant, here with dysuria 2d ago, treated for UTI with keflex, back for repeat hcg.  She had an US showing a gestational sac only, OB was called, stated that she did not need to be on the beta list, and patient was advised to return to her gyn for a repeat hcg in 48 hours.  She is already scheduled for a repeat US on Monday 12/10.  She denies F/C, abd pain, urinary sx, vaginal bleeding.  Exam normal.  Will repeat b-hcg.  If >2x 7700, will refer to her gyn for repeat US as scheduled on Monday, if less, will repeat US here.

## 2018-12-08 NOTE — ED ADULT TRIAGE NOTE - NS_BH TRG QUESTION2_ED_ALL_ED
"Pt reports waking up with left hip/groin pain and noticed there was \"a hole\" there with white drainage initially but none at this time.     Leonila Sung RN  12/08/18 0057    "
Assisted/Witness for BEREKET Avina in Exam of abrasion on left hip     Mariana Oneil, RN  12/08/18 0120    
Pt stated that she woke up and the area on her left hip was hurting, pt took a shower and noticed the spot. Pt denies any falls, scratches. The area is approximately 1 cm in length, pink edges of wound. The depth looks to appear to be 1/2 cm. Pt denies any drainage, no drainage noted at this time. Pt appears in NAD; pt's breathing is even and unlabored. Call light in reach and bed at lowest position. Reassurance given. Family at bedside.        Mariana Oneil RN  12/08/18 0056    
No

## 2019-01-15 ENCOUNTER — EMERGENCY (EMERGENCY)
Facility: HOSPITAL | Age: 24
LOS: 1 days | Discharge: HOME | End: 2019-01-15
Admitting: EMERGENCY MEDICINE
Payer: MEDICAID

## 2019-01-15 VITALS
SYSTOLIC BLOOD PRESSURE: 109 MMHG | TEMPERATURE: 98 F | OXYGEN SATURATION: 100 % | RESPIRATION RATE: 16 BRPM | HEART RATE: 78 BPM | DIASTOLIC BLOOD PRESSURE: 61 MMHG

## 2019-01-15 DIAGNOSIS — Z90.49 ACQUIRED ABSENCE OF OTHER SPECIFIED PARTS OF DIGESTIVE TRACT: Chronic | ICD-10-CM

## 2019-01-15 PROCEDURE — 99283 EMERGENCY DEPT VISIT LOW MDM: CPT | Mod: 25

## 2019-01-15 RX ORDER — ACETAMINOPHEN 500 MG
650 TABLET ORAL ONCE
Qty: 0 | Refills: 0 | Status: COMPLETED | OUTPATIENT
Start: 2019-01-15 | End: 2019-01-15

## 2019-01-15 RX ADMIN — Medication 650 MILLIGRAM(S): at 07:53

## 2019-01-15 NOTE — ED ADULT TRIAGE NOTE - CHIEF COMPLAINT QUOTE
Pt is from [facility], c/o head pain after hitting head while rolling around in bed this morning. Denies LOC. Pt is 11 weeks pregnant, denies OBGYN complaints. H/O depression, anxiety.

## 2019-01-15 NOTE — ED PROVIDER NOTE - OBJECTIVE STATEMENT
23 year old female with a PMHx of hypothyroidism, asthma, seizure d.o, bipolar d/o - resident at outpatient [facility] pw superficial abrasion to the right side of the scalp after hitting her head on a plastic safe while getting up out of bed this morning. Pt states that is was initially bleeding but it has now stopped. She is having mild pain around wound. TDAP up to date. She is also 11 weeks gestation - has prenatal care setup. Denies LOC, n/v/f/c, CP, SOB, abdominal pain, dysuria, hematuria, melena, hematochezia, numbness/weakness/tingling of extremities, vaginal bleeding/dc/itching/odor. 23 year old female with a PMHx of hypothyroidism, asthma, seizure d.o, bipolar d/o - resident at outpatient [facility] pw superficial abrasion to the right side of the scalp after hitting her head on a plastic safe while getting up out of bed this morning. Pt states that is was initially bleeding but it has now stopped. She is having mild pain around wound. TDAP up to date. She is also 11 weeks gestation - has prenatal care setup - appointment tomorrow. Denies LOC, n/v/f/c, CP, SOB, abdominal pain, dysuria, hematuria, melena, hematochezia, numbness/weakness/tingling of extremities, vaginal bleeding/dc/itching/odor.

## 2019-01-15 NOTE — ED PROVIDER NOTE - NSFOLLOWUPINSTRUCTIONS_ED_ALL_ED_FT
Keep wound clean and dry. Take Tylenol 650mg every 4h as needed for pain. Please continue all home medications as directed. See your regular doctor within 72 hours for follow-up care. Return to ER for new or worsening symptoms.

## 2019-01-15 NOTE — ED PROVIDER NOTE - MEDICAL DECISION MAKING DETAILS
23 year old female with a PMHx of hypothyroidism, asthma, seizure d.o, bipolar d/o - resident at outpatient [facility] pw superficial abrasion to the right side of the scalp after hitting her head on a plastic safe while getting up out of bed this morning.  Plan: clean wound, tylenol

## 2019-02-01 ENCOUNTER — EMERGENCY (EMERGENCY)
Facility: HOSPITAL | Age: 24
LOS: 1 days | Discharge: HOME | End: 2019-02-01
Attending: EMERGENCY MEDICINE | Admitting: EMERGENCY MEDICINE
Payer: MEDICAID

## 2019-02-01 VITALS
OXYGEN SATURATION: 99 % | RESPIRATION RATE: 16 BRPM | HEART RATE: 100 BPM | SYSTOLIC BLOOD PRESSURE: 115 MMHG | TEMPERATURE: 98 F | DIASTOLIC BLOOD PRESSURE: 80 MMHG

## 2019-02-01 VITALS — SYSTOLIC BLOOD PRESSURE: 108 MMHG | DIASTOLIC BLOOD PRESSURE: 65 MMHG

## 2019-02-01 DIAGNOSIS — Z90.49 ACQUIRED ABSENCE OF OTHER SPECIFIED PARTS OF DIGESTIVE TRACT: Chronic | ICD-10-CM

## 2019-02-01 LAB
ANION GAP SERPL CALC-SCNC: 13 MMO/L — SIGNIFICANT CHANGE UP (ref 7–14)
APPEARANCE UR: SIGNIFICANT CHANGE UP
BACTERIA # UR AUTO: SIGNIFICANT CHANGE UP
BASOPHILS # BLD AUTO: 0.01 K/UL — SIGNIFICANT CHANGE UP (ref 0–0.2)
BASOPHILS NFR BLD AUTO: 0.1 % — SIGNIFICANT CHANGE UP (ref 0–2)
BILIRUB UR-MCNC: NEGATIVE — SIGNIFICANT CHANGE UP
BLD GP AB SCN SERPL QL: NEGATIVE — SIGNIFICANT CHANGE UP
BLOOD UR QL VISUAL: SIGNIFICANT CHANGE UP
BUN SERPL-MCNC: 7 MG/DL — SIGNIFICANT CHANGE UP (ref 7–23)
CALCIUM SERPL-MCNC: 9.5 MG/DL — SIGNIFICANT CHANGE UP (ref 8.4–10.5)
CHLORIDE SERPL-SCNC: 103 MMOL/L — SIGNIFICANT CHANGE UP (ref 98–107)
CO2 SERPL-SCNC: 21 MMOL/L — LOW (ref 22–31)
COLOR SPEC: YELLOW — SIGNIFICANT CHANGE UP
CREAT SERPL-MCNC: 0.49 MG/DL — LOW (ref 0.5–1.3)
EOSINOPHIL # BLD AUTO: 0.01 K/UL — SIGNIFICANT CHANGE UP (ref 0–0.5)
EOSINOPHIL NFR BLD AUTO: 0.1 % — SIGNIFICANT CHANGE UP (ref 0–6)
GLUCOSE SERPL-MCNC: 84 MG/DL — SIGNIFICANT CHANGE UP (ref 70–99)
GLUCOSE UR-MCNC: NEGATIVE — SIGNIFICANT CHANGE UP
HCG SERPL-ACNC: SIGNIFICANT CHANGE UP MIU/ML
HCT VFR BLD CALC: 38.1 % — SIGNIFICANT CHANGE UP (ref 34.5–45)
HGB BLD-MCNC: 12.4 G/DL — SIGNIFICANT CHANGE UP (ref 11.5–15.5)
HYALINE CASTS # UR AUTO: HIGH
IMM GRANULOCYTES NFR BLD AUTO: 0.4 % — SIGNIFICANT CHANGE UP (ref 0–1.5)
KETONES UR-MCNC: HIGH
LEUKOCYTE ESTERASE UR-ACNC: SIGNIFICANT CHANGE UP
LIDOCAIN IGE QN: 15.8 U/L — SIGNIFICANT CHANGE UP (ref 7–60)
LYMPHOCYTES # BLD AUTO: 17.8 % — SIGNIFICANT CHANGE UP (ref 13–44)
LYMPHOCYTES # BLD AUTO: 2.14 K/UL — SIGNIFICANT CHANGE UP (ref 1–3.3)
MCHC RBC-ENTMCNC: 26.1 PG — LOW (ref 27–34)
MCHC RBC-ENTMCNC: 32.5 % — SIGNIFICANT CHANGE UP (ref 32–36)
MCV RBC AUTO: 80 FL — SIGNIFICANT CHANGE UP (ref 80–100)
MONOCYTES # BLD AUTO: 0.55 K/UL — SIGNIFICANT CHANGE UP (ref 0–0.9)
MONOCYTES NFR BLD AUTO: 4.6 % — SIGNIFICANT CHANGE UP (ref 2–14)
MUCOUS THREADS # UR AUTO: SIGNIFICANT CHANGE UP
NEUTROPHILS # BLD AUTO: 9.24 K/UL — HIGH (ref 1.8–7.4)
NEUTROPHILS NFR BLD AUTO: 77 % — SIGNIFICANT CHANGE UP (ref 43–77)
NITRITE UR-MCNC: NEGATIVE — SIGNIFICANT CHANGE UP
NRBC # FLD: 0 K/UL — LOW (ref 25–125)
PH UR: 6 — SIGNIFICANT CHANGE UP (ref 5–8)
PLATELET # BLD AUTO: 296 K/UL — SIGNIFICANT CHANGE UP (ref 150–400)
PMV BLD: 10.9 FL — SIGNIFICANT CHANGE UP (ref 7–13)
POTASSIUM SERPL-MCNC: 4 MMOL/L — SIGNIFICANT CHANGE UP (ref 3.5–5.3)
POTASSIUM SERPL-SCNC: 4 MMOL/L — SIGNIFICANT CHANGE UP (ref 3.5–5.3)
PROT UR-MCNC: 100 — HIGH
RBC # BLD: 4.76 M/UL — SIGNIFICANT CHANGE UP (ref 3.8–5.2)
RBC # FLD: 13.2 % — SIGNIFICANT CHANGE UP (ref 10.3–14.5)
RBC CASTS # UR COMP ASSIST: SIGNIFICANT CHANGE UP (ref 0–?)
RH IG SCN BLD-IMP: POSITIVE — SIGNIFICANT CHANGE UP
SODIUM SERPL-SCNC: 137 MMOL/L — SIGNIFICANT CHANGE UP (ref 135–145)
SP GR SPEC: 1.02 — SIGNIFICANT CHANGE UP (ref 1–1.04)
SQUAMOUS # UR AUTO: SIGNIFICANT CHANGE UP
UROBILINOGEN FLD QL: NORMAL — SIGNIFICANT CHANGE UP
WBC # BLD: 12 K/UL — HIGH (ref 3.8–10.5)
WBC # FLD AUTO: 12 K/UL — HIGH (ref 3.8–10.5)
WBC UR QL: HIGH (ref 0–?)

## 2019-02-01 PROCEDURE — 99284 EMERGENCY DEPT VISIT MOD MDM: CPT

## 2019-02-01 PROCEDURE — 76801 OB US < 14 WKS SINGLE FETUS: CPT | Mod: 26

## 2019-02-01 PROCEDURE — 76770 US EXAM ABDO BACK WALL COMP: CPT | Mod: 26

## 2019-02-01 RX ORDER — AMPICILLIN SODIUM AND SULBACTAM SODIUM 250; 125 MG/ML; MG/ML
3 INJECTION, POWDER, FOR SUSPENSION INTRAMUSCULAR; INTRAVENOUS ONCE
Qty: 0 | Refills: 0 | Status: COMPLETED | OUTPATIENT
Start: 2019-02-01 | End: 2019-02-01

## 2019-02-01 RX ORDER — ACETAMINOPHEN 500 MG
975 TABLET ORAL ONCE
Qty: 0 | Refills: 0 | Status: COMPLETED | OUTPATIENT
Start: 2019-02-01 | End: 2019-02-01

## 2019-02-01 RX ADMIN — AMPICILLIN SODIUM AND SULBACTAM SODIUM 200 GRAM(S): 250; 125 INJECTION, POWDER, FOR SUSPENSION INTRAMUSCULAR; INTRAVENOUS at 17:45

## 2019-02-01 RX ADMIN — Medication 975 MILLIGRAM(S): at 17:30

## 2019-02-01 NOTE — ED PROVIDER NOTE - MEDICAL DECISION MAKING DETAILS
Jonathan Weil, PGY2 - TVUS to assess for fetal injury, labs in anticipation of possible need for rhogam if any relevant findings on TVUS, abx for human bite prophylaxis, renal US given proximity of L flank injury but low likelihood of a serious intra-thoracic or intraabdominal pathology. Bite wounds irrigated with copious amounts of saline and scrubbed with soap-water solution.
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
## 2019-02-01 NOTE — ED PROVIDER NOTE - ATTENDING CONTRIBUTION TO CARE
DR. BLOCH, ATTENDING MD-  I performed a face to face bedside interview with patient regarding history of present illness, review of symptoms and past medical history. I completed an independent physical exam.  I have discussed patient's plan of care with the resident.  Patient examined, well appearing NAD HEENT nml lungs clear abd soft nontender, bite nelsy left cheek, left flank nd ant left thigh. no rib or spine tenderness, neuro nonfocal, sensation intact, volar forearm  with old healed scars from cutting. @-12 intact motor intact, sensation intact

## 2019-02-01 NOTE — ED PROVIDER NOTE - PROGRESS NOTE DETAILS
Leola Vaughan MD: US nml. Will send home with abx and f/u. Follow up instructions given, importance of follow up emphasized, return to ED parameters reviewed and patient verbalized understanding.  All questions answered, all concerns addressed.
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
## 2019-02-01 NOTE — ED PROVIDER NOTE - NSFOLLOWUPINSTRUCTIONS_ED_ALL_ED_FT
-Take 2 regular strength (650mg) or 1 extra strength (500mg) of Tylenol. If you have any questions please consult a pharmacist or your PMD.   -Take Augmentin for next 5 days.   -Follow up with your primary care provider in 24-48 hours.   -Please return to the Emergency Department if you experience any new/worsening symptoms, including but are not limited to: unrelenting nausea, vomiting, fever, chills, chest pain, shortness of breath, dizziness, chest or abdominal pain, worsening back pain, syncope, blood in urine or stool, headache that does not resolve, numbness or tingling, loss of sensation, loss of motor function, or any other concerning symptoms.

## 2019-02-23 ENCOUNTER — INPATIENT (INPATIENT)
Facility: HOSPITAL | Age: 24
LOS: 5 days | Discharge: HOME | End: 2019-03-01
Attending: PSYCHIATRY & NEUROLOGY | Admitting: PSYCHIATRY & NEUROLOGY
Payer: MEDICAID

## 2019-02-23 VITALS
DIASTOLIC BLOOD PRESSURE: 58 MMHG | SYSTOLIC BLOOD PRESSURE: 109 MMHG | TEMPERATURE: 98 F | OXYGEN SATURATION: 100 % | RESPIRATION RATE: 16 BRPM | HEART RATE: 86 BPM

## 2019-02-23 DIAGNOSIS — Z90.49 ACQUIRED ABSENCE OF OTHER SPECIFIED PARTS OF DIGESTIVE TRACT: Chronic | ICD-10-CM

## 2019-02-23 LAB
ALBUMIN SERPL ELPH-MCNC: 3.9 G/DL — SIGNIFICANT CHANGE UP (ref 3.3–5)
ALP SERPL-CCNC: 64 U/L — SIGNIFICANT CHANGE UP (ref 40–120)
ALT FLD-CCNC: 14 U/L — SIGNIFICANT CHANGE UP (ref 4–33)
AMPHET UR-MCNC: NEGATIVE — SIGNIFICANT CHANGE UP
ANION GAP SERPL CALC-SCNC: 13 MMO/L — SIGNIFICANT CHANGE UP (ref 7–14)
APPEARANCE UR: SIGNIFICANT CHANGE UP
AST SERPL-CCNC: 19 U/L — SIGNIFICANT CHANGE UP (ref 4–32)
BACTERIA # UR AUTO: SIGNIFICANT CHANGE UP
BARBITURATES UR SCN-MCNC: NEGATIVE — SIGNIFICANT CHANGE UP
BASE EXCESS BLDV CALC-SCNC: -0.7 MMOL/L — SIGNIFICANT CHANGE UP
BASOPHILS # BLD AUTO: 0.02 K/UL — SIGNIFICANT CHANGE UP (ref 0–0.2)
BASOPHILS NFR BLD AUTO: 0.2 % — SIGNIFICANT CHANGE UP (ref 0–2)
BENZODIAZ UR-MCNC: NEGATIVE — SIGNIFICANT CHANGE UP
BILIRUB SERPL-MCNC: 0.3 MG/DL — SIGNIFICANT CHANGE UP (ref 0.2–1.2)
BILIRUB UR-MCNC: NEGATIVE — SIGNIFICANT CHANGE UP
BLOOD GAS VENOUS - CREATININE: 0.38 MG/DL — LOW (ref 0.5–1.3)
BLOOD UR QL VISUAL: SIGNIFICANT CHANGE UP
BUN SERPL-MCNC: 4 MG/DL — LOW (ref 7–23)
CALCIUM SERPL-MCNC: 9.2 MG/DL — SIGNIFICANT CHANGE UP (ref 8.4–10.5)
CANNABINOIDS UR-MCNC: NEGATIVE — SIGNIFICANT CHANGE UP
CHLORIDE BLDV-SCNC: 106 MMOL/L — SIGNIFICANT CHANGE UP (ref 96–108)
CHLORIDE SERPL-SCNC: 103 MMOL/L — SIGNIFICANT CHANGE UP (ref 98–107)
CO2 SERPL-SCNC: 21 MMOL/L — LOW (ref 22–31)
COCAINE METAB.OTHER UR-MCNC: NEGATIVE — SIGNIFICANT CHANGE UP
COLOR SPEC: YELLOW — SIGNIFICANT CHANGE UP
CREAT SERPL-MCNC: 0.41 MG/DL — LOW (ref 0.5–1.3)
EOSINOPHIL # BLD AUTO: 0 K/UL — SIGNIFICANT CHANGE UP (ref 0–0.5)
EOSINOPHIL NFR BLD AUTO: 0 % — SIGNIFICANT CHANGE UP (ref 0–6)
GAS PNL BLDV: 136 MMOL/L — SIGNIFICANT CHANGE UP (ref 136–146)
GLUCOSE BLDV-MCNC: 79 — SIGNIFICANT CHANGE UP (ref 70–99)
GLUCOSE SERPL-MCNC: 80 MG/DL — SIGNIFICANT CHANGE UP (ref 70–99)
GLUCOSE UR-MCNC: NEGATIVE — SIGNIFICANT CHANGE UP
HCO3 BLDV-SCNC: 23 MMOL/L — SIGNIFICANT CHANGE UP (ref 20–27)
HCT VFR BLD CALC: 38.6 % — SIGNIFICANT CHANGE UP (ref 34.5–45)
HCT VFR BLDV CALC: 39.3 % — SIGNIFICANT CHANGE UP (ref 34.5–45)
HGB BLD-MCNC: 12.3 G/DL — SIGNIFICANT CHANGE UP (ref 11.5–15.5)
HGB BLDV-MCNC: 12.8 G/DL — SIGNIFICANT CHANGE UP (ref 11.5–15.5)
HYALINE CASTS # UR AUTO: SIGNIFICANT CHANGE UP
IMM GRANULOCYTES NFR BLD AUTO: 0.3 % — SIGNIFICANT CHANGE UP (ref 0–1.5)
KETONES UR-MCNC: NEGATIVE — SIGNIFICANT CHANGE UP
LACTATE BLDV-MCNC: 1.7 MMOL/L — SIGNIFICANT CHANGE UP (ref 0.5–2)
LEUKOCYTE ESTERASE UR-ACNC: SIGNIFICANT CHANGE UP
LYMPHOCYTES # BLD AUTO: 2.06 K/UL — SIGNIFICANT CHANGE UP (ref 1–3.3)
LYMPHOCYTES # BLD AUTO: 23 % — SIGNIFICANT CHANGE UP (ref 13–44)
MAGNESIUM SERPL-MCNC: 1.8 MG/DL — SIGNIFICANT CHANGE UP (ref 1.6–2.6)
MCHC RBC-ENTMCNC: 25.9 PG — LOW (ref 27–34)
MCHC RBC-ENTMCNC: 31.9 % — LOW (ref 32–36)
MCV RBC AUTO: 81.3 FL — SIGNIFICANT CHANGE UP (ref 80–100)
METHADONE UR-MCNC: NEGATIVE — SIGNIFICANT CHANGE UP
MONOCYTES # BLD AUTO: 0.5 K/UL — SIGNIFICANT CHANGE UP (ref 0–0.9)
MONOCYTES NFR BLD AUTO: 5.6 % — SIGNIFICANT CHANGE UP (ref 2–14)
NEUTROPHILS # BLD AUTO: 6.33 K/UL — SIGNIFICANT CHANGE UP (ref 1.8–7.4)
NEUTROPHILS NFR BLD AUTO: 70.9 % — SIGNIFICANT CHANGE UP (ref 43–77)
NITRITE UR-MCNC: NEGATIVE — SIGNIFICANT CHANGE UP
NRBC # FLD: 0 K/UL — LOW (ref 25–125)
OPIATES UR-MCNC: NEGATIVE — SIGNIFICANT CHANGE UP
OXYCODONE UR-MCNC: NEGATIVE — SIGNIFICANT CHANGE UP
PCO2 BLDV: 41 MMHG — SIGNIFICANT CHANGE UP (ref 41–51)
PCP UR-MCNC: NEGATIVE — SIGNIFICANT CHANGE UP
PH BLDV: 7.38 PH — SIGNIFICANT CHANGE UP (ref 7.32–7.43)
PH UR: 7.5 — SIGNIFICANT CHANGE UP (ref 5–8)
PHOSPHATE SERPL-MCNC: 3.8 MG/DL — SIGNIFICANT CHANGE UP (ref 2.5–4.5)
PLATELET # BLD AUTO: 312 K/UL — SIGNIFICANT CHANGE UP (ref 150–400)
PMV BLD: 11 FL — SIGNIFICANT CHANGE UP (ref 7–13)
PO2 BLDV: 41 MMHG — HIGH (ref 35–40)
POTASSIUM BLDV-SCNC: 3.6 MMOL/L — SIGNIFICANT CHANGE UP (ref 3.4–4.5)
POTASSIUM SERPL-MCNC: 3.7 MMOL/L — SIGNIFICANT CHANGE UP (ref 3.5–5.3)
POTASSIUM SERPL-SCNC: 3.7 MMOL/L — SIGNIFICANT CHANGE UP (ref 3.5–5.3)
PROT SERPL-MCNC: 7.1 G/DL — SIGNIFICANT CHANGE UP (ref 6–8.3)
PROT UR-MCNC: 20 — SIGNIFICANT CHANGE UP
RBC # BLD: 4.75 M/UL — SIGNIFICANT CHANGE UP (ref 3.8–5.2)
RBC # FLD: 13.9 % — SIGNIFICANT CHANGE UP (ref 10.3–14.5)
RBC CASTS # UR COMP ASSIST: SIGNIFICANT CHANGE UP (ref 0–?)
SAO2 % BLDV: 71 % — SIGNIFICANT CHANGE UP (ref 60–85)
SODIUM SERPL-SCNC: 137 MMOL/L — SIGNIFICANT CHANGE UP (ref 135–145)
SP GR SPEC: 1.01 — SIGNIFICANT CHANGE UP (ref 1–1.04)
SQUAMOUS # UR AUTO: SIGNIFICANT CHANGE UP
UROBILINOGEN FLD QL: NORMAL — SIGNIFICANT CHANGE UP
WBC # BLD: 8.94 K/UL — SIGNIFICANT CHANGE UP (ref 3.8–10.5)
WBC # FLD AUTO: 8.94 K/UL — SIGNIFICANT CHANGE UP (ref 3.8–10.5)
WBC UR QL: >50 — HIGH (ref 0–?)

## 2019-02-23 NOTE — ED PROVIDER NOTE - CLINICAL SUMMARY MEDICAL DECISION MAKING FREE TEXT BOX
Laura: r/o apap/salicylate overdose, electrolyte abnormality, dysrythmia, fetal demise, UTI. Plan for EKG, labs, UA, POCT OB US, d/w tox, psych eval.

## 2019-02-23 NOTE — ED ADULT NURSE NOTE - CHIEF COMPLAINT QUOTE
Pt arrives to ED via EMS from [facility].  Pt is 16 weeks pregnant.  Pt took 7 days worth of medications for prenatal vitamins, Synthroid and Buspirone.  Pt was also cutting her right arm.  Area cleaned and dressed by EMS.  Pt playing with cell phone in triage.  Pt denies SI but reports she cut herself because she "wanted to feel pain."  Pt to be seen in main ED.

## 2019-02-23 NOTE — ED ADULT NURSE NOTE - PAIN RATING/NUMBER SCALE (0-10): REST
Assessment/Plan:    No problem-specific Assessment & Plan notes found for this encounter  Diagnoses and all orders for this visit:    Pneumonia of both lungs due to infectious organism, unspecified part of lung  Comments:  resolving will update x-ray in 4 weeks   Orders:  -     XR chest pa & lateral; Future    Attempting to conceive    Cough          Subjective:      Patient ID: Víctor Manuel is a 30 y o  female  Patienthere to establish care and follow up on her pneumonia  Patient has been sick for the past two weeks and originally presented to walk in center and    Walk in center visit on 313/19  29-year-old female with past medical history of asthmatic bronchitis presents with a cough and congestion for 4 days  Patient states her son is sick at with similar symptoms  Patient states her son was tested for the flu was negative  She complains of chills, wheezing and shortness of breath  Denies fever at home  Patient was prescribed a cough medication and inhaler and treated with Levaquin 750 mg for pneumonia  ED visit was having troubles with breathing and felt like she was going to pass out   Patient is a 29-year-old immunized female who presents emergency department with shortness of breath for 1 day  Patient states that associated symptoms are intermittent hacking productive cough with yellow sputum, and bilateral nasal congestion for 4 days  Patient stated that she had intermittent palpitations for 1 day for approximately 10 minute bouts  Patient was recently seen at urgent care 02/13/2019 for cold-like symptoms and cough, with chest x-ray indicating left lower lobe infiltrate suspicious for early pneumonia and vague 7 mm nodular density on left lower lobe, patient was diagnosed with acute bacterial bronchitis, discharged home care with prescriptions of Medrol Dosepak, Phenergan-dm, levofloxacin, and albuterol inhaler    Patient stated that she became short of breath with walking into emergency department whereby she needed to sit in a wheelchair and the wheeled to her room  Patient had a CT scan of her lungs showing   Nodular airspace opacities in the lower lobes (right greater than left) which may represent infectious versus inflammatory causes  Follow-up to resolution is recommended  Patient was treated with doxycyline and changed her cough medications     Patient here for follow up on her pneumonia and has finished her doxycyline and is using inhaler at night at this point  Patient is having lots of coughing and phlegm  Patient is taking robitussin and not taking cough pills at this point  Patient also has a history of hyper triglycerides and currently not taking any medications for this  Patient also is trying to conceive for the past several months  Patient also has a GYN and currently trying to conceive         Pneumonia   She complains of cough  There is no shortness of breath  Pertinent negatives include no appetite change, chest pain, ear pain, fever, postnasal drip, sneezing or sore throat  The following portions of the patient's history were reviewed and updated as appropriate:   She  has a past medical history of Elevated cholesterol  She   Patient Active Problem List    Diagnosis Date Noted    Pneumonia of both lungs due to infectious organism 2019    Attempting to conceive 2019    Cough 2019    Chondromalacia patellae 2016    Pes planus 2016    Patellofemoral stress syndrome 2016    Gestational diabetes mellitus, antepartum 2015     She  has a past surgical history that includes  section and Dilation and curettage of uterus  Her family history includes Diabetes in her mother  She  reports that she has never smoked  She has never used smokeless tobacco  She reports that she does not drink alcohol or use drugs  She is allergic to penicillins; zithromax [azithromycin]; and clindamycin       Review of Systems Constitutional: Negative for activity change, appetite change, chills, diaphoresis, fatigue, fever and unexpected weight change  HENT: Positive for congestion  Negative for ear pain, hearing loss, postnasal drip, sinus pressure, sinus pain, sneezing and sore throat  Eyes: Negative for pain, redness and visual disturbance  Respiratory: Positive for cough  Negative for shortness of breath  Cardiovascular: Negative for chest pain and leg swelling  Gastrointestinal: Negative for abdominal pain, diarrhea, nausea and vomiting  Musculoskeletal: Negative for arthralgias  Neurological: Negative for dizziness and light-headedness  Psychiatric/Behavioral: Negative for behavioral problems and dysphoric mood  Objective:      /68   Pulse 72   Temp 98 2 °F (36 8 °C) (Tympanic)   Ht 5' 2" (1 575 m)   Wt 103 kg (227 lb 3 2 oz)   SpO2 97%   BMI 41 56 kg/m²          Physical Exam   Constitutional: She is oriented to person, place, and time  Vital signs are normal  She appears well-developed and well-nourished  No distress  HENT:   Head: Normocephalic and atraumatic  Eyes: Pupils are equal, round, and reactive to light  Neck: Normal range of motion  No thyromegaly present  Cardiovascular: Normal rate, regular rhythm, normal heart sounds and intact distal pulses  No murmur heard  Pulmonary/Chest: Effort normal and breath sounds normal  No respiratory distress  She has no wheezes  Abdominal: Soft  Bowel sounds are normal    Musculoskeletal: Normal range of motion  Neurological: She is alert and oriented to person, place, and time  Skin: Skin is warm and dry  Psychiatric: She has a normal mood and affect  Nursing note and vitals reviewed  0

## 2019-02-23 NOTE — ED ADULT NURSE NOTE - OBJECTIVE STATEMENT
23yof a&ox4 amb presents to ed 20 s/p overdose on synthroid and buproprion. pt reports taking about 6 pills. pt also noted to have many self-inflicted lacerations to R arm. pt reports OD and cutting in order to "feel pain." pt denies any SI/HI. pt has hx of cutting, depression, bipolar. pt denies any cp, sob, dizziness, lightheadedness, n/v/d, fever/chills. breathing even/unlabored. 20g iv lock placed to L forearm.

## 2019-02-23 NOTE — ED PROVIDER NOTE - PROGRESS NOTE DETAILS
Lynn CÁRDENAS: Spoke to toxicology about patient's ingestion; nothing to do about Folic acid; Synthroid - would need repeat thyroid studies performed in 3 days; Buspirone - main concern for serotonin syndrome - assess for rigidity, hypertension, clonus and observe for 6 hours post ingestion.  Prenatal vitamins - main concern for Fe; observe for 6 hours post ingestion for N/V. Lynn CÁRDENAS: Spoke to toxicology about patient's ingestion; nothing to do about Folic acid; Synthroid - would need repeat thyroid studies performed in 3 days; Buspirone - main concern for serotonin syndrome - assess for rigidity, hypertension, clonus and observe for 6 hours post ingestion.  Prenatal vitamins - main concern for Fe; observe for 6 hours post ingestion for N/V.  Can reach toxicology with [facility] [phone]. facility reports that pt actually took Bonjesta (antihistamine/B6) not buspirone. Lynn CÁRDENAS: Spoke to psych about change in medication from Buspirone to Bonjesta; patient has no signs of anti-cholinergic symptoms. Lynn CÁRDENAS: Spoke with psychiatry; patient clinically cleared and safe for BH transfer. Patient seen and evaluated by cyndee. Called by cyndee attending who requested Benadryl IM.  Pt to be admitted to Dr. Russel Grant [facility]

## 2019-02-23 NOTE — ED ADULT TRIAGE NOTE - CHIEF COMPLAINT QUOTE
Pt arrives to ED via EMS from [facility].  Pt is 16 weeks pregnant.  Pt took 7 days worth of medications for prenatal vitamins, Synthroid and Buspirone.  Pt was also cutting her right arm.  Area cleaned and dressed by EMS.  Pt playing with cell phone in triage.  Pt denies SI but reports she cut herself because she "wanted to feel pain."  Pt to be seen in main ED.

## 2019-02-23 NOTE — ED ADULT NURSE NOTE - NS ED PATIENT SAFETY CONCERN
PT Re-Evaluation     Today's date: 10/5/2018  Patient name: Michaelle Sands  : 1952  MRN: [id number]  Referring provider: Eulalia Ibarra MD  Dx:   Encounter Diagnosis     ICD-10-CM    1  Right knee pain, unspecified chronicity M25 561    2  Meniscus, lateral, derangement, right M23 300        Start Time: 1025  Stop Time: 1150  Total time in clinic (min): 85 minutes    Assessment  Impairments: abnormal or restricted ROM, activity intolerance, impaired physical strength, lacks appropriate home exercise program, pain with function, poor posture  and poor body mechanics    Assessment details: Patient has been making slow steady progress to her knee rehabilitation program but complained of increased soreness today secondary to increased exercises last visit  Her ROM and strength has been progressing as well before today  She has been tolerating an aquatic therapy program well and feels less weight bearing stresses on her knee in the pool  Understanding of Dx/Px/POC: fair   Prognosis: fair    Goals  ST-3 WEEKS  1  Decrease pain by 2 points on VAS at its worst MET  2  Increase ROM by > 5 deg in all deficients planes  Working Towards  3  Increase LE by 1/2 MMT grade in all deficient planes  Working towards    LT-6 WEEKS  1  Patient to be independent with a/iadls  2  Increase functional activities for leisure and home activities to previous LOF  3  Independent with HEP and/or fitness program   NEW GOAL  1   Progress with ROM and strength so that patient can ambulate up/down the steps painfree    Plan  Patient would benefit from: skilled physical therapy  Planned modality interventions: cryotherapy, electrical stimulation/Russian stimulation, thermotherapy: hydrocollator packs and unattended electrical stimulation  Planned therapy interventions: activity modification, behavior modification, body mechanics training, aquatic therapy, flexibility, functional ROM exercises, home exercise program, IADL retraining, joint mobilization, manual therapy, neuromuscular re-education, patient education, postural training, strengthening, stretching, therapeutic activities and therapeutic exercise  Frequency: 2x week  Duration in visits: 12  Duration in weeks: 6  Treatment plan discussed with: patient  Plan details: Continue with aggressive strengthening exercise program 1x/week land and 1x/week pool        Subjective Evaluation    History of Present Illness  Date of onset: 2/3/2017  Date of surgery: 2018  Mechanism of injury: Patient has had 10 PT visits and notes slow improvement  Quality of life: good    Pain  Current pain ratin  At best pain ratin  At worst pain ratin  Quality: radiating, knife-like and throbbing  Relieving factors: medications and rest  Aggravating factors: stair climbing, sitting, standing and walking  Progression: improved    Social Support  Steps to enter house: yes  Stairs in house: yes   Lives in: multiple-level home  Lives with: spouse      Diagnostic Tests  MRI studies: abnormal        Objective     Tenderness     Right Knee   Tenderness in the medial joint line and medial patella  Active Range of Motion   Left Knee   Flexion: 128 degrees   Extension: 0 degrees     Right Knee   Flexion: 100 degrees   Extension: -7 degrees     Strength/Myotome Testing     Left Knee   Flexion: 4+  Extension: 4+    Right Knee   Flexion: 4-  Extension: 3+    Tests     Right Knee   Positive Apley's compression, medial Triny and patellar compression  Swelling     Left Knee Girth Measurement (cm)   Joint line: 19 8 cm    Right Knee Girth Measurement (cm)   Joint line: 19 cm    Ambulation     Observational Gait   Gait: antalgic   Decreased walking speed, stride length and right stance time       Additional Observational Gait Details  USES SPC, difficulty with sit to stand      Flowsheet Rows      Most Recent Value   PT/OT G-Codes   Current Score  26   Projected Score  34   Assessment Type Re-evaluation   G code set  Mobility: Walking & Moving Around   Mobility: Walking and Moving Around Current Status ()  CL   Mobility: Walking and Moving Around Goal Status ()  CK No
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
## 2019-02-23 NOTE — ED PROVIDER NOTE - PHYSICAL EXAMINATION
*Gen: NAD, AAO*3  *HEENT: NC/AT, MMM, airway patent, trachea midline  *CV: RRR, S1/S2 present, no murmurs/rubs/gallops  *Resp: no respiratory distress, LCTAB, no wheezing/rales/rhonchi  *Abd: non-distended, soft N/Tx4, no guarding or rigidity  *Neuro: no focal neuro deficits, moving all limbs appropriately  *Extremities: no gross deformity  *Skin: no rashes, multiple superficial cuts on the right forearm  ~ Joseline Bailey M.D.

## 2019-02-23 NOTE — ED PROVIDER NOTE - ATTENDING CONTRIBUTION TO CARE
Dr. Sanchez: I have personally performed a face to face bedside history and physical examination of this patient. I have discussed the history, examination, review of systems, assessment and plan of management with the resident. I have reviewed the electronic medical record and amended it to reflect my history, review of systems, physical exam, assessment and plan.      see chart  16wk pregnant,. hx of psych disease here with overdose on prenatal vitamins, folic acid,  synthroid, bonjesta.  pt also cut her forearm. Tetanus utd. denies si. also with urinary symtpoms and vaginal discharge    on exam alert and awake  CTA b/l  rRR s 1 s2  EOMI, perrla  abd soft nt/nd  no flank tenderness    r/o apap/salicylate overdose, electrolyte abnormality, dysrythmia, fetal demise, UTI. Plan for EKG, labs, UA, POCT OB US, d/w tox, psych eval.

## 2019-02-24 DIAGNOSIS — F12.11 CANNABIS ABUSE, IN REMISSION: ICD-10-CM

## 2019-02-24 DIAGNOSIS — F10.11 ALCOHOL ABUSE, IN REMISSION: ICD-10-CM

## 2019-02-24 DIAGNOSIS — F60.3 BORDERLINE PERSONALITY DISORDER: ICD-10-CM

## 2019-02-24 DIAGNOSIS — F29 UNSPECIFIED PSYCHOSIS NOT DUE TO A SUBSTANCE OR KNOWN PHYSIOLOGICAL CONDITION: ICD-10-CM

## 2019-02-24 DIAGNOSIS — F39 UNSPECIFIED MOOD [AFFECTIVE] DISORDER: ICD-10-CM

## 2019-02-24 DIAGNOSIS — F43.10 POST-TRAUMATIC STRESS DISORDER, UNSPECIFIED: ICD-10-CM

## 2019-02-24 LAB
APAP SERPL-MCNC: < 15 UG/ML — LOW (ref 15–25)
ETHANOL BLD-MCNC: < 10 MG/DL — SIGNIFICANT CHANGE UP
SALICYLATES SERPL-MCNC: < 5 MG/DL — LOW (ref 15–30)

## 2019-02-24 PROCEDURE — 99285 EMERGENCY DEPT VISIT HI MDM: CPT

## 2019-02-24 RX ORDER — DIPHENHYDRAMINE HCL 50 MG
50 CAPSULE ORAL ONCE
Qty: 0 | Refills: 0 | Status: COMPLETED | OUTPATIENT
Start: 2019-02-24 | End: 2019-02-24

## 2019-02-24 RX ORDER — CEFTRIAXONE 500 MG/1
1 INJECTION, POWDER, FOR SOLUTION INTRAMUSCULAR; INTRAVENOUS ONCE
Qty: 0 | Refills: 0 | Status: COMPLETED | OUTPATIENT
Start: 2019-02-24 | End: 2019-02-24

## 2019-02-24 RX ORDER — HALOPERIDOL DECANOATE 100 MG/ML
5 INJECTION INTRAMUSCULAR ONCE
Qty: 0 | Refills: 0 | Status: COMPLETED | OUTPATIENT
Start: 2019-02-24 | End: 2019-02-24

## 2019-02-24 RX ADMIN — Medication 50 MILLIGRAM(S): at 06:31

## 2019-02-24 RX ADMIN — HALOPERIDOL DECANOATE 5 MILLIGRAM(S): 100 INJECTION INTRAMUSCULAR at 06:54

## 2019-02-24 RX ADMIN — CEFTRIAXONE 100 GRAM(S): 500 INJECTION, POWDER, FOR SOLUTION INTRAMUSCULAR; INTRAVENOUS at 00:47

## 2019-02-24 NOTE — ED BEHAVIORAL HEALTH ASSESSMENT NOTE - NS ED BHA PLAN ADMIT TO PSYCHIATRY BH CONTACTED FT2
not available after hours call placed to [facility] but provider not reached, message left for ICM at [phone]

## 2019-02-24 NOTE — ED BEHAVIORAL HEALTH ASSESSMENT NOTE - DESCRIPTION (FIRST USE, LAST USE, QUANTITY, FREQUENCY, DURATION)
Started age 22, currently uses 5 cigarettes/day h/o alcohol use disorder. Denied current use. h/ cannabis use. Denied current use.
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
## 2019-02-24 NOTE — ED BEHAVIORAL HEALTH ASSESSMENT NOTE - SUICIDE PROTECTIVE FACTORS
Identifies reasons for living/Positive therapeutic relationships/Engaged in work or school/Future oriented
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
## 2019-02-24 NOTE — ED BEHAVIORAL HEALTH NOTE - BEHAVIORAL HEALTH NOTE
Worker called [facility] and spoke to Amy in intake ([phone]) who states that there is no female beds. Worker then called [facility] ([phone]) and left a message for call back on social work line regarding beds. Worker then called [facility] and Dr. Johnson spoke to attg and states that he is unable to accept patient at this time due to pregnancy.

## 2019-02-24 NOTE — ED BEHAVIORAL HEALTH ASSESSMENT NOTE - RISK ASSESSMENT
Patient is at high risk for suicide, self-harm and violence based on her past and current presentation. She is not safe for discharge and will be admitted to inpatient unit for further stabilization and observation.

## 2019-02-24 NOTE — ED BEHAVIORAL HEALTH ASSESSMENT NOTE - NS ED BHA PLAN MEDICAL ISSUES2 FT
n/a medically cleared as per EM provider, ordered TSH, Hgb A1c and lipid profile in AM, will hold off restarting Synthroid 175mcg daily pending results.

## 2019-02-24 NOTE — ED BEHAVIORAL HEALTH ASSESSMENT NOTE - NS ED BHA PLAN ADMIT TO PSYCHIATRY OTHER2 FT
pt cannot be admitted to a david unit due to her severe agitation and aggression in the ER upon being told she is being admitted. Will hold in the ED and transfer to another facility. pt cannot be admitted to a david unit due to her severe agitation and aggression in the ER upon being told she is being admitted.  As of AM of 2/25 patient provided a bed at [address].
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
## 2019-02-24 NOTE — ED BEHAVIORAL HEALTH ASSESSMENT NOTE - NS ED BHA PLAN PSYCHIATRIC ISSUES2 FT
haloperidol 5mg and/or benadryl 25/50mg po PRN for agitation Will hold off on standing medications and provided benadryl 25 po PRN for agitation and benadryl 50mg IM prn severe agitation.

## 2019-02-24 NOTE — ED BEHAVIORAL HEALTH ASSESSMENT NOTE - OTHER PAST PSYCHIATRIC HISTORY (INCLUDE DETAILS REGARDING ONSET, COURSE OF ILLNESS, INPATIENT/OUTPATIENT TREATMENT)
out borderline personality disorder and recent trauma treatment focus. She has a history of 10 prior inpatient psychiatric hospitalizations, most recently at [facility] in 11/2016 for suicidality.  Attends [facility], Psychiatrist is Dr. Sherwood and therapist Ms. Sullivan.

## 2019-02-24 NOTE — ED BEHAVIORAL HEALTH ASSESSMENT NOTE - AXIS III
Pregnancy (current), obesity, asthma, seizures, gallstones, hypothyroidism. Pregnancy (current), obesity, asthma, gallstones, hypothyroidism.
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
## 2019-02-25 LAB
BACTERIA UR CULT: SIGNIFICANT CHANGE UP
C TRACH RRNA SPEC QL NAA+PROBE: SIGNIFICANT CHANGE UP
N GONORRHOEA RRNA SPEC QL NAA+PROBE: SIGNIFICANT CHANGE UP
N GONORRHOEA RRNA SPEC QL NAA+PROBE: SIGNIFICANT CHANGE UP
SPECIMEN SOURCE: SIGNIFICANT CHANGE UP
TSH SERPL-MCNC: 4.77 UIU/ML — HIGH (ref 0.27–4.2)

## 2019-02-25 RX ORDER — SENNA PLUS 8.6 MG/1
1 TABLET ORAL
Qty: 0 | Refills: 0 | COMMUNITY

## 2019-02-25 RX ORDER — PYRIDOXINE HCL (VITAMIN B6) 100 MG
1 TABLET ORAL
Qty: 0 | Refills: 0 | COMMUNITY

## 2019-02-25 RX ORDER — DIPHENHYDRAMINE HCL 50 MG
50 CAPSULE ORAL ONCE
Qty: 0 | Refills: 0 | Status: DISCONTINUED | OUTPATIENT
Start: 2019-02-25 | End: 2019-03-01

## 2019-02-25 RX ORDER — INFLUENZA VIRUS VACCINE 15; 15; 15; 15 UG/.5ML; UG/.5ML; UG/.5ML; UG/.5ML
0.5 SUSPENSION INTRAMUSCULAR ONCE
Qty: 0 | Refills: 0 | Status: COMPLETED | OUTPATIENT
Start: 2019-02-25 | End: 2019-03-01

## 2019-02-25 RX ORDER — FERROUS SULFATE 325(65) MG
1 TABLET ORAL
Qty: 0 | Refills: 0 | COMMUNITY

## 2019-02-25 RX ORDER — DIVALPROEX SODIUM 500 MG/1
1 TABLET, DELAYED RELEASE ORAL
Qty: 0 | Refills: 0 | COMMUNITY

## 2019-02-25 RX ORDER — THIAMINE MONONITRATE (VIT B1) 100 MG
1 TABLET ORAL
Qty: 0 | Refills: 0 | COMMUNITY

## 2019-02-25 RX ORDER — FOLIC ACID 0.8 MG
1 TABLET ORAL DAILY
Qty: 0 | Refills: 0 | Status: DISCONTINUED | OUTPATIENT
Start: 2019-02-25 | End: 2019-03-01

## 2019-02-25 RX ORDER — HYDROXYZINE HCL 10 MG
50 TABLET ORAL
Qty: 0 | Refills: 0 | COMMUNITY

## 2019-02-25 RX ORDER — HEXAVITAMINS
1 TABLET ORAL
Qty: 0 | Refills: 0 | COMMUNITY

## 2019-02-25 RX ORDER — DIPHENHYDRAMINE HCL 50 MG
25 CAPSULE ORAL EVERY 6 HOURS
Qty: 0 | Refills: 0 | Status: DISCONTINUED | OUTPATIENT
Start: 2019-02-25 | End: 2019-03-01

## 2019-02-25 RX ORDER — CLOZAPINE 150 MG/1
1 TABLET, ORALLY DISINTEGRATING ORAL
Qty: 0 | Refills: 0 | COMMUNITY

## 2019-02-25 RX ORDER — BACITRACIN ZINC 500 UNIT/G
1 OINTMENT IN PACKET (EA) TOPICAL ONCE
Qty: 0 | Refills: 0 | Status: COMPLETED | OUTPATIENT
Start: 2019-02-25 | End: 2019-02-26

## 2019-02-25 RX ORDER — DIPHENHYDRAMINE HCL 50 MG
1 CAPSULE ORAL
Qty: 0 | Refills: 0 | COMMUNITY

## 2019-02-25 RX ORDER — DOCUSATE SODIUM 100 MG
200 CAPSULE ORAL
Qty: 0 | Refills: 0 | COMMUNITY

## 2019-02-25 RX ORDER — NICOTINE POLACRILEX 2 MG
1 GUM BUCCAL
Qty: 0 | Refills: 0 | COMMUNITY

## 2019-02-25 NOTE — ED BEHAVIORAL HEALTH NOTE - BEHAVIORAL HEALTH NOTE
Writer contacted central intake for a census bed.  Pt assigned to [facility].

## 2019-02-25 NOTE — ED ADULT NURSE REASSESSMENT NOTE - NS ED NURSE REASSESS COMMENT FT1
0930  Pt calm & jose made aware of admission to [address] pt transported via security accompanied by a PES.
Patient increasingly psychotic, lying on floor in between bed and wall and then displaying aggressive behavior including banging head on floor.  Patient reevaluated by psych MD and medical attending MD, patient medicated per MD orders and then placed in 4 point restraints.  1:1 constant observation initiated.
Patient sleeping and removed from 4-point restraints @0710.  Skin examined, she has redness to her scalp but no wounds or bleeding noted.  Patient has multiple preexisting superficial lacerations to her right forearm.  She pulled the dressing off her wounds and scratched them open.  Wounds were redressed.  Patient is calm and cooperative at this time.  Will continue to monitor patient.
Pt now calm and cooperative; ambulated to bathroom. Reassessment vitals as noted.
Pt received post break. Pt presently sleeping, respirations even and non labored. Pt awaiting psychiatric admission bed.
Received report from night RN pt calm & cooperative lying on bed in nad pt denies Si/Hi/AVh presently safety & comfort measures maintained eval on going.
Received report from night RN pt lying on stretcher in nad eyes close breathing even & unlabored safety & comfort measures maintained eval on going.
Report given to JEREMY Pandey, pt in [facility], moved over to .
pt calm & cooperative back to baseline pt denies Si/Hi/AVh presently, emotional support provided safety & comfort measures maintained eval on going.
pt calm & cooperative lying on stretcher in nad pt tolerated dinner denies Si/Hi/AVh presently safety & comfort measures maintained eval on going.
Pt sleeping, arousable to verbal stimuli, waken up to get V/S, and reassessed, denies SI HI hearing/visual hallucination, no bleeding on Right arm, w/c she inflicted prior ED visit. Denies HA dizziness SOB CP palpitation N V sweating abdominal pain dysuria, Looks comfortable NAD will continue to monitor
Patient currently resting in  room 2, NAD, needs met, vitals stable, currently awaiting bedroom assignment.
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
## 2019-02-26 ENCOUNTER — LABORATORY RESULT (OUTPATIENT)
Age: 24
End: 2019-02-26

## 2019-02-26 ENCOUNTER — NON-APPOINTMENT (OUTPATIENT)
Age: 24
End: 2019-02-26

## 2019-02-26 ENCOUNTER — APPOINTMENT (OUTPATIENT)
Dept: OBGYN | Facility: HOSPITAL | Age: 24
End: 2019-02-26

## 2019-02-26 ENCOUNTER — RESULT REVIEW (OUTPATIENT)
Age: 24
End: 2019-02-26

## 2019-02-26 ENCOUNTER — OUTPATIENT (OUTPATIENT)
Dept: OUTPATIENT SERVICES | Facility: HOSPITAL | Age: 24
LOS: 1 days | End: 2019-02-26

## 2019-02-26 VITALS
SYSTOLIC BLOOD PRESSURE: 102 MMHG | HEIGHT: 59 IN | BODY MASS INDEX: 29.43 KG/M2 | WEIGHT: 146 LBS | DIASTOLIC BLOOD PRESSURE: 56 MMHG | HEART RATE: 67 BPM

## 2019-02-26 DIAGNOSIS — Z87.19 PERSONAL HISTORY OF OTHER DISEASES OF THE DIGESTIVE SYSTEM: ICD-10-CM

## 2019-02-26 DIAGNOSIS — Z90.49 ACQUIRED ABSENCE OF OTHER SPECIFIED PARTS OF DIGESTIVE TRACT: Chronic | ICD-10-CM

## 2019-02-26 DIAGNOSIS — Z87.898 PERSONAL HISTORY OF OTHER SPECIFIED CONDITIONS: ICD-10-CM

## 2019-02-26 DIAGNOSIS — Z91.5 PERSONAL HISTORY OF SELF-HARM: ICD-10-CM

## 2019-02-26 DIAGNOSIS — O09.92 SUPERVISION OF HIGH RISK PREGNANCY, UNSPECIFIED, SECOND TRIMESTER: ICD-10-CM

## 2019-02-26 DIAGNOSIS — Z82.49 FAMILY HISTORY OF ISCHEMIC HEART DISEASE AND OTHER DISEASES OF THE CIRCULATORY SYSTEM: ICD-10-CM

## 2019-02-26 DIAGNOSIS — E73.9 LACTOSE INTOLERANCE, UNSPECIFIED: ICD-10-CM

## 2019-02-26 DIAGNOSIS — Z81.8 FAMILY HISTORY OF OTHER MENTAL AND BEHAVIORAL DISORDERS: ICD-10-CM

## 2019-02-26 DIAGNOSIS — F41.9 ANXIETY DISORDER, UNSPECIFIED: ICD-10-CM

## 2019-02-26 DIAGNOSIS — R76.11 NONSPECIFIC REACTION TO TUBERCULIN SKIN TEST W/OUT ACTIVE TUBERCULOSIS: ICD-10-CM

## 2019-02-26 DIAGNOSIS — Z82.5 FAMILY HISTORY OF ASTHMA AND OTHER CHRONIC LOWER RESPIRATORY DISEASES: ICD-10-CM

## 2019-02-26 PROBLEM — Z00.00 ENCOUNTER FOR PREVENTIVE HEALTH EXAMINATION: Status: ACTIVE | Noted: 2019-02-26

## 2019-02-26 LAB
CHOLEST SERPL-MCNC: 183 MG/DL — SIGNIFICANT CHANGE UP (ref 120–199)
HBA1C BLD-MCNC: 5.3 % — SIGNIFICANT CHANGE UP (ref 4–5.6)
HDLC SERPL-MCNC: 80 MG/DL — HIGH (ref 45–65)
LIPID PNL WITH DIRECT LDL SERPL: 111 MG/DL — SIGNIFICANT CHANGE UP
T4 FREE SERPL-MCNC: 1.24 NG/DL — SIGNIFICANT CHANGE UP (ref 0.9–1.8)
TRIGL SERPL-MCNC: 93 MG/DL — SIGNIFICANT CHANGE UP (ref 10–149)
TSH RECEP AB FLD-ACNC: <0.5 IU/L — SIGNIFICANT CHANGE UP (ref 0–1.75)
TSH SERPL-MCNC: 6.75 UIU/ML — HIGH (ref 0.27–4.2)
URATE SERPL-MCNC: 3.6 MG/DL — SIGNIFICANT CHANGE UP (ref 2.5–7)

## 2019-02-26 PROCEDURE — G0452: CPT | Mod: 26

## 2019-02-26 PROCEDURE — 88141 CYTOPATH C/V INTERPRET: CPT

## 2019-02-26 PROCEDURE — 99222 1ST HOSP IP/OBS MODERATE 55: CPT

## 2019-02-26 RX ORDER — ONDANSETRON 8 MG/1
4 TABLET, FILM COATED ORAL DAILY
Qty: 0 | Refills: 0 | Status: DISCONTINUED | OUTPATIENT
Start: 2019-02-26 | End: 2019-03-01

## 2019-02-26 RX ORDER — LEVOTHYROXINE SODIUM 125 MCG
175 TABLET ORAL DAILY
Qty: 0 | Refills: 0 | Status: DISCONTINUED | OUTPATIENT
Start: 2019-02-26 | End: 2019-03-01

## 2019-02-26 RX ORDER — ONDANSETRON 8 MG/1
4 TABLET, FILM COATED ORAL ONCE
Qty: 0 | Refills: 0 | Status: COMPLETED | OUTPATIENT
Start: 2019-02-26 | End: 2019-02-26

## 2019-02-26 RX ORDER — DIPHENHYDRAMINE HCL 50 MG
50 CAPSULE ORAL AT BEDTIME
Qty: 0 | Refills: 0 | Status: DISCONTINUED | OUTPATIENT
Start: 2019-02-26 | End: 2019-03-01

## 2019-02-26 RX ADMIN — ONDANSETRON 4 MILLIGRAM(S): 8 TABLET, FILM COATED ORAL at 08:50

## 2019-02-26 RX ADMIN — Medication 1 MILLIGRAM(S): at 08:50

## 2019-02-26 RX ADMIN — Medication 1 TABLET(S): at 08:50

## 2019-02-26 RX ADMIN — Medication 1 APPLICATION(S): at 10:20

## 2019-02-27 ENCOUNTER — APPOINTMENT (OUTPATIENT)
Dept: OBGYN | Facility: HOSPITAL | Age: 24
End: 2019-02-27

## 2019-02-27 DIAGNOSIS — O09.92 SUPERVISION OF HIGH RISK PREGNANCY, UNSPECIFIED, SECOND TRIMESTER: ICD-10-CM

## 2019-02-27 LAB
HBV SURFACE AG SER-ACNC: NONREACTIVE — SIGNIFICANT CHANGE UP
HCV AB S/CO SERPL IA: 0.13 S/CO — SIGNIFICANT CHANGE UP (ref 0–0.79)
HCV AB SERPL-IMP: SIGNIFICANT CHANGE UP
HGB A MFR BLD: 96.9 % — SIGNIFICANT CHANGE UP
HGB A2 MFR BLD: 2.8 % — SIGNIFICANT CHANGE UP (ref 2.4–3.5)
HGB ELECT COMMENT: SIGNIFICANT CHANGE UP
HGB F MFR BLD: < 1 % — SIGNIFICANT CHANGE UP (ref 0–1.5)
HIV 1+2 AB+HIV1 P24 AG SERPL QL IA: SIGNIFICANT CHANGE UP
T3 SERPL-MCNC: 132.6 NG/DL — SIGNIFICANT CHANGE UP (ref 80–200)
T4 AB SER-ACNC: 11.25 UG/DL — SIGNIFICANT CHANGE UP (ref 5.1–13)

## 2019-02-27 PROCEDURE — 90853 GROUP PSYCHOTHERAPY: CPT

## 2019-02-27 PROCEDURE — 99232 SBSQ HOSP IP/OBS MODERATE 35: CPT | Mod: 25

## 2019-02-27 RX ORDER — BACITRACIN ZINC 500 UNIT/G
1 OINTMENT IN PACKET (EA) TOPICAL ONCE
Qty: 0 | Refills: 0 | Status: COMPLETED | OUTPATIENT
Start: 2019-02-27 | End: 2019-02-27

## 2019-02-27 RX ORDER — DIPHENHYDRAMINE HCL 50 MG
50 CAPSULE ORAL ONCE
Qty: 0 | Refills: 0 | Status: COMPLETED | OUTPATIENT
Start: 2019-02-27 | End: 2019-02-27

## 2019-02-27 RX ADMIN — Medication 50 MILLIGRAM(S): at 00:10

## 2019-02-27 RX ADMIN — Medication 50 MILLIGRAM(S): at 22:52

## 2019-02-27 RX ADMIN — Medication 175 MICROGRAM(S): at 09:01

## 2019-02-27 RX ADMIN — Medication 1 MILLIGRAM(S): at 09:01

## 2019-02-27 RX ADMIN — Medication 1 APPLICATION(S): at 23:00

## 2019-02-27 RX ADMIN — ONDANSETRON 4 MILLIGRAM(S): 8 TABLET, FILM COATED ORAL at 10:16

## 2019-02-27 RX ADMIN — Medication 1 TABLET(S): at 09:01

## 2019-02-27 NOTE — ED BEHAVIORAL HEALTH NOTE - BEHAVIORAL HEALTH NOTE
Michelle was placed on restraints at 6:40am and restraints were discontinued at 7:17am. Michelle presented very agitated, started throwing herself on the floor, scratching her R forearm (cuts started bleeding), and staff was not able to deescalate (see restraint order). Refused po meds and continued to be agitated/danger to self after IM meds were administered, requiring 4 point-restraint. Michelle was on constant observation while restrained. At 7:17am she was asleep, calm and restraints were safe to be discontinued. Michelle was placed on restraints on 2/24 at 6:40am and restraints were discontinued at 7:17am. Michelle presented very agitated, started throwing herself on the floor, scratching her R forearm (cuts started bleeding), and staff was not able to deescalate (see restraint order). Refused po meds and continued to be agitated/danger to self after IM meds were administered, requiring 4 point-restraint. Michelle was on constant observation while restrained. At 7:17am she was asleep, calm and restraints were safe to be discontinued.

## 2019-02-28 LAB
BACTERIA UR CULT: SIGNIFICANT CHANGE UP
LEAD SERPL-MCNC: < 1 UG/DL — SIGNIFICANT CHANGE UP (ref 0–4)
SPECIMEN SOURCE: SIGNIFICANT CHANGE UP
T PALLIDUM AB TITR SER: NEGATIVE — SIGNIFICANT CHANGE UP
VZV IGG FLD QL IA: 183.5 INDEX — SIGNIFICANT CHANGE UP
VZV IGG FLD QL IA: POSITIVE — SIGNIFICANT CHANGE UP

## 2019-02-28 PROCEDURE — 99232 SBSQ HOSP IP/OBS MODERATE 35: CPT

## 2019-02-28 PROCEDURE — 90834 PSYTX W PT 45 MINUTES: CPT

## 2019-02-28 RX ORDER — LEVOTHYROXINE SODIUM 125 MCG
1 TABLET ORAL
Qty: 0 | Refills: 0 | COMMUNITY

## 2019-02-28 RX ORDER — LEVOTHYROXINE SODIUM 125 MCG
1 TABLET ORAL
Qty: 14 | Refills: 0 | OUTPATIENT
Start: 2019-02-28 | End: 2019-03-13

## 2019-02-28 RX ORDER — DIPHENHYDRAMINE HCL 50 MG
50 CAPSULE ORAL ONCE
Qty: 0 | Refills: 0 | Status: COMPLETED | OUTPATIENT
Start: 2019-02-28 | End: 2019-02-28

## 2019-02-28 RX ORDER — ONDANSETRON 8 MG/1
1 TABLET, FILM COATED ORAL
Qty: 14 | Refills: 0 | OUTPATIENT
Start: 2019-02-28 | End: 2019-03-13

## 2019-02-28 RX ORDER — DOXYLAMINE SUCCINATE AND PYRIDOXINE HYDROCHLORIDE, DELAYED RELEASE TABLETS 10 MG/10 MG 10; 10 MG/1; MG/1
1 TABLET, DELAYED RELEASE ORAL
Qty: 0 | Refills: 0 | COMMUNITY

## 2019-02-28 RX ORDER — FOLIC ACID 0.8 MG
1 TABLET ORAL
Qty: 0 | Refills: 0 | COMMUNITY

## 2019-02-28 RX ADMIN — Medication 1 TABLET(S): at 09:34

## 2019-02-28 RX ADMIN — Medication 50 MILLIGRAM(S): at 22:45

## 2019-02-28 RX ADMIN — Medication 1 MILLIGRAM(S): at 09:34

## 2019-02-28 RX ADMIN — Medication 50 MILLIGRAM(S): at 00:04

## 2019-02-28 RX ADMIN — Medication 175 MICROGRAM(S): at 09:34

## 2019-03-01 VITALS — TEMPERATURE: 99 F

## 2019-03-01 LAB
RUBV IGG SER-ACNC: 0.8 INDEX — SIGNIFICANT CHANGE UP
RUBV IGG SER-IMP: NEGATIVE — SIGNIFICANT CHANGE UP

## 2019-03-01 PROCEDURE — 99239 HOSP IP/OBS DSCHRG MGMT >30: CPT | Mod: 25

## 2019-03-01 PROCEDURE — 90853 GROUP PSYCHOTHERAPY: CPT

## 2019-03-01 RX ADMIN — Medication 175 MICROGRAM(S): at 09:42

## 2019-03-01 RX ADMIN — INFLUENZA VIRUS VACCINE 0.5 MILLILITER(S): 15; 15; 15; 15 SUSPENSION INTRAMUSCULAR at 12:54

## 2019-03-01 RX ADMIN — Medication 1 TABLET(S): at 09:42

## 2019-03-01 RX ADMIN — Medication 1 MILLIGRAM(S): at 09:42

## 2019-03-02 LAB
GAMMA INTERFERON BACKGROUND BLD IA-ACNC: 0.02 IU/ML — SIGNIFICANT CHANGE UP
M TB IFN-G BLD-IMP: NEGATIVE — SIGNIFICANT CHANGE UP
M TB IFN-G CD4+ BCKGRND COR BLD-ACNC: 0.01 IU/ML — SIGNIFICANT CHANGE UP
M TB IFN-G CD4+CD8+ BCKGRND COR BLD-ACNC: 0.01 IU/ML — SIGNIFICANT CHANGE UP
QUANT TB PLUS MITOGEN MINUS NIL: 6.69 IU/ML — SIGNIFICANT CHANGE UP

## 2019-03-05 ENCOUNTER — RESULT REVIEW (OUTPATIENT)
Age: 24
End: 2019-03-05

## 2019-03-05 DIAGNOSIS — Z11.3 ENCOUNTER FOR SCREENING FOR INFECTIONS WITH A PREDOMINANTLY SEXUAL MODE OF TRANSMISSION: ICD-10-CM

## 2019-03-05 LAB — CFTR MUT ANL BLD/T: NEGATIVE — SIGNIFICANT CHANGE UP

## 2019-03-05 RX ORDER — METRONIDAZOLE 7.5 MG/G
0.75 GEL VAGINAL
Qty: 10 | Refills: 0 | Status: ACTIVE | COMMUNITY
Start: 2019-03-05 | End: 1900-01-01

## 2019-03-05 RX ORDER — METRONIDAZOLE 500 MG/1
500 TABLET ORAL
Qty: 1 | Refills: 0 | Status: ACTIVE | COMMUNITY
Start: 2019-03-05 | End: 1900-01-01

## 2019-03-08 LAB
2ND TRIMESTER DATA: SIGNIFICANT CHANGE UP
AFP SERPL-ACNC: SIGNIFICANT CHANGE UP
B-HCG FREE SERPL-MCNC: SIGNIFICANT CHANGE UP
CLINICAL BIOCHEMIST REVIEW: SIGNIFICANT CHANGE UP
DEMOGRAPHIC DATA: SIGNIFICANT CHANGE UP
INHIBIN A SERPL-MCNC: SIGNIFICANT CHANGE UP
SCREEN-FOOTER: SIGNIFICANT CHANGE UP
U ESTRIOL SERPL-SCNC: SIGNIFICANT CHANGE UP

## 2019-03-12 ENCOUNTER — NON-APPOINTMENT (OUTPATIENT)
Age: 24
End: 2019-03-12

## 2019-03-12 ENCOUNTER — APPOINTMENT (OUTPATIENT)
Dept: ANTEPARTUM | Facility: CLINIC | Age: 24
End: 2019-03-12
Payer: MEDICAID

## 2019-03-12 ENCOUNTER — OUTPATIENT (OUTPATIENT)
Dept: OUTPATIENT SERVICES | Facility: HOSPITAL | Age: 24
LOS: 1 days | End: 2019-03-12

## 2019-03-12 ENCOUNTER — ASOB RESULT (OUTPATIENT)
Age: 24
End: 2019-03-12

## 2019-03-12 ENCOUNTER — APPOINTMENT (OUTPATIENT)
Dept: OBGYN | Facility: HOSPITAL | Age: 24
End: 2019-03-12

## 2019-03-12 VITALS — DIASTOLIC BLOOD PRESSURE: 65 MMHG | WEIGHT: 151 LBS | SYSTOLIC BLOOD PRESSURE: 126 MMHG

## 2019-03-12 DIAGNOSIS — F60.3 BORDERLINE PERSONALITY DISORDER: ICD-10-CM

## 2019-03-12 DIAGNOSIS — E03.9 HYPOTHYROIDISM, UNSPECIFIED: ICD-10-CM

## 2019-03-12 DIAGNOSIS — Z3A.19 19 WEEKS GESTATION OF PREGNANCY: ICD-10-CM

## 2019-03-12 DIAGNOSIS — F32.9 MAJOR DEPRESSIVE DISORDER, SINGLE EPISODE, UNSPECIFIED: ICD-10-CM

## 2019-03-12 DIAGNOSIS — Z90.49 ACQUIRED ABSENCE OF OTHER SPECIFIED PARTS OF DIGESTIVE TRACT: Chronic | ICD-10-CM

## 2019-03-12 PROCEDURE — 76811 OB US DETAILED SNGL FETUS: CPT | Mod: 26

## 2019-03-13 RX ORDER — LEVOTHYROXINE SODIUM 0.2 MG/1
200 TABLET ORAL DAILY
Qty: 60 | Refills: 1 | Status: ACTIVE | COMMUNITY
Start: 2019-03-13 | End: 1900-01-01

## 2019-03-13 RX ORDER — LEVOTHYROXINE SODIUM 0.2 MG/1
200 TABLET ORAL DAILY
Qty: 60 | Refills: 1 | Status: DISCONTINUED | COMMUNITY
Start: 2019-03-12 | End: 2019-03-13

## 2019-05-10 NOTE — ED PROVIDER NOTE - SKIN, MLM
· The estimated ejection fraction is 35% (3/11/19)  · Left ventricular diastolic dysfunction. (3/11/19)  -Followed in HTS clinic by Dr. Chavarria  -She used to be on adcirca but stopped due to symptomatic hypotension  -Fluid removal via HD  -On no ARB/ACE/BB due to recurrent hypotension     Skin normal color for race, warm, dry and intact. No evidence of rash.

## 2019-05-14 NOTE — ED ADULT NURSE NOTE - NSFALLRSKPASTHIST_ED_ALL_ED
Pt was last seen in 2016 presents today for a new problem since Bhavik she has had pain along fifth met right foot no trauma noted.   She saw  A podiatrist in March they did xrays and inj did not help  Her ankles swell up off and on no

## 2019-06-03 NOTE — ED BEHAVIORAL HEALTH ASSESSMENT NOTE - MARITAL STATUS
Dr. Aleman has left several messages to Dr. Power offices and no one has return her calls.  Dr. Aleman spoke to mom to let her know what is going on.   Single

## 2019-09-30 NOTE — ED PROVIDER NOTE - GASTROINTESTINAL, MLM
Patient called back, his schedule too busy today, he is leaving on a 21-day cruise tomorrow. He will reschedule upon his return.    Abdomen soft, non-tender, no guarding. No CVA tenderness bilaterally.
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
## 2020-02-23 NOTE — ED PROVIDER NOTE - MEDICAL DECISION MAKING DETAILS
23-Feb-2020 23 y/o F probable gastroenteritis vs gastritis, will start IV fluids, order labs and monitor closely

## 2020-05-28 NOTE — ED PROVIDER NOTE - CROS ED ROS STATEMENT
Quality 431: Preventive Care And Screening: Unhealthy Alcohol Use - Screening: Patient screened for unhealthy alcohol use using a single question and scores less than 2 times per year Quality 226: Preventive Care And Screening: Tobacco Use: Screening And Cessation Intervention: Patient screened for tobacco use and is an ex/non-smoker Detail Level: Detailed all other ROS negative except as per HPI

## 2020-07-14 NOTE — ED BEHAVIORAL HEALTH ASSESSMENT NOTE - THOUGHT ASSOCIATIONS
Writer alerted Rosamaria Schuler, RN at the [facility] clinic regarding patient's referral.     Hematology team will contact patient to scheduled required appt. Patient has been made aware.     Three month of therapy will be completed on 7/30/20, however, d/t the unprovoked nature, patient may require lifetime therapy (tbd). CRISTIAN GalvanN, RN     Normal

## 2020-09-24 NOTE — ED ADULT TRIAGE NOTE - BP NONINVASIVE DIASTOLIC (MM HG)
Nayana @ the [employer] wants to know if Dr. Claudeen Puff can appear for pt's trial on 10/22 at the [facility].  Please contact Nayana at: [phone]
[phone]

## 2020-10-16 NOTE — ED PROVIDER NOTE - NS ED ATTENDING STATEMENT MOD
100
I have personally performed a face to face diagnostic evaluation on this patient. I have reviewed the ACP note and agree with the history, exam and plan of care, except as noted.

## 2020-12-10 NOTE — ED ADULT NURSE NOTE - CAS DISCH BELONGINGS RETURNED
Minocycline Pregnancy And Lactation Text: This medication is Pregnancy Category D and not consider safe during pregnancy. It is also excreted in breast milk. Not applicable

## 2021-03-30 NOTE — ED PROVIDER NOTE - PSH
0630 am : pt reports tooth cracked. It appears to be approximately 1/4th of his back left molar. Pt denies pain. Tooth piece is at bedside in green denture cup. Pt reports \"it cracked biting into this potato chip\". 0200 am: PT VSS, Aox4.  Pt arm preca severity of pain and evaluate response  - Implement non-pharmacological measures as appropriate and evaluate response  - Consider cultural and social influences on pain and pain management  - Manage/alleviate anxiety  - Utilize distraction and/or relaxatio ability or social support system  Outcome: Progressing     Problem: RESPIRATORY - ADULT  Goal: Achieves optimal ventilation and oxygenation  Description: INTERVENTIONS:  - Assess for changes in respiratory status  - Assess for changes in mentation and beha or returns to baseline bowel function  Description: INTERVENTIONS:  - Assess bowel function  - Maintain adequate hydration with IV or PO as ordered and tolerated  - Evaluate effectiveness of GI medications  - Encourage mobilization and activity  - Obtain n growth factors as ordered  - Implement neutropenic guidelines  Outcome: Progressing S/P thyroid surgery

## 2021-06-04 NOTE — ED BEHAVIORAL HEALTH ASSESSMENT NOTE - SAFETY PLAN DETAILS
Pt escorted to [facility] for 6 min walk test.    LS
Identified individuals at housing who can be sought for safety, advised to return to ED or [facility] if symptoms should worsen or she should have acute concern for her safety.

## 2021-07-16 NOTE — ED PROVIDER NOTE - OBJECTIVE STATEMENT
Patient seen and evaluated @ 9am on 3 DSU  Chief Complaint: diarrhea    HPI:  85yo F hx of MVR s/p repair, Afib on Eliquis, HTN, HLD, CHF, Gout, p/w SOB x 2-3 days. Pt reports falling 5 days ago, after becoming dizzy and passing out for a brief second. She landed on her back and buttock, and hit her head. Over the past 2-3 days, she has been having worsening SOB and weakness. Last night, she had midline CP, palpitations, diaphoresis, that lasted all night. She denied any N/V, radiation of pain.     Patient has had CP and SOB on exertion for a long time, can only take approx 10 steps until she has symptoms. She has also had cough w/ brown sputum in the AM for a few weeks. She states taht she has had diarrhea for a few days 2/2 colitis, and has been taking Betamethasone to help w/ the diarrhea, but stopped due to dizziness.     Patient denies any fever, sick contacts, recent travel history, abd pain, N/V, urinary changes.     In the ED, patient found to be in A Fib w/ RVR and Cardiology was consulted.  (2021 21:29)    PMH:   Insomnia    HTN (hypertension)    Hypercholesteremia    GERD (gastroesophageal reflux disease)    Carotid artery occlusion    Vitamin B 12 deficiency    Osteoporosis    Mitral valve disease    Gallstones    Choledocholithiasis    CHF (congestive heart failure)      PSH:   S/P MVR (mitral valve repair)    Status post DACIA-BSO    Hx of tonsillectomy    Abdominal hernia    History of lumbar laminectomy for spinal cord decompression    Papilloma of breast    Bilateral cataracts    Varicose veins    H/O carotid endarterectomy    S/P laparoscopic cholecystectomy      Medications:   acetaminophen   Tablet .. 650 milliGRAM(s) Oral every 6 hours PRN  allopurinol 200 milliGRAM(s) Oral daily  aluminum hydroxide/magnesium hydroxide/simethicone Suspension 30 milliLiter(s) Oral every 4 hours PRN  amitriptyline 25 milliGRAM(s) Oral daily  apixaban 2.5 milliGRAM(s) Oral every 12 hours  buDESOnide    EC Capsule 9 milliGRAM(s) Oral daily  diltiazem    milliGRAM(s) Oral daily  melatonin 3 milliGRAM(s) Oral at bedtime PRN  metoprolol tartrate 100 milliGRAM(s) Oral two times a day  ondansetron Injectable 4 milliGRAM(s) IV Push every 8 hours PRN  oxybutynin 5 milliGRAM(s) Oral daily  pantoprazole    Tablet 40 milliGRAM(s) Oral before breakfast  sodium chloride 0.9%. 1000 milliLiter(s) IV Continuous <Continuous>    Allergies:  &quot;VASELINE BASED OINTMENTS&quot; (Urticaria; Rash)  adhesives (Urticaria; Rash)  contrast media (gadolinium-based) (Chills)  Norvasc (Pruritus)  statins (Unknown)    FAMILY HISTORY:  Family history of lung cancer (Grandparent)    Family history of dementia  mother    FH: cirrhosis  father    FH: ovarian cancer in first degree relative (Child)    Social History: lives alone  Smoking: former smoker  Alcohol: no EtOH abuse - used to drink wine  Drugs: no illicit drug use    Review of Systems:    Constitutional: No fever, chills, fatigue, or changes in weight  HEENT: No blurry vision, eye pain, headache, runny nose, or sore throat  Respiratory: No shortness of breath, cough, or wheezing  Cardiovascular: No chest pain or palpitations  Gastrointestinal: No nausea, vomiting, diarrhea, or abdominal pain  Genitourinary: No dysuria or incontinence  Extremities: No lower extremity swelling  Neurologic: No focal findings  Lymphatic: No lymphadenopathy   Skin: No rash  Psychiatry: No anxiety or depression  10 point review of systems is otherwise negative except as mentioned above            Physical Exam:  T(C): 36.3 (21 @ 20:43), Max: 36.4 (21 @ 13:03)  HR: 63 (21 @ 20:43) (59 - 63)  BP: 114/64 (21 @ 20:43) (114/64 - 137/64)  RR: 18 (21 @ 20:43) (18 - 18)  SpO2: 95% (21 @ 20:43) (94% - 97%)  Wt(kg): --    07-15 @ 07:01  -   @ 07:00  --------------------------------------------------------  IN: 300 mL / OUT: 300 mL / NET: 0 mL     @ 07:01  -   @ 22:33  --------------------------------------------------------  IN: 460 mL / OUT: 0 mL / NET: 460 mL      Daily     Daily Weight in k.4 (2021 04:29)    Appearance: Normal, well groomed, NAD  Eyes: PERRLA, EOMI, pink conjunctiva, no scleral icterus   HENT: Normal oral mucosa  Cardiovascular: RRR, S1, S2, no murmur, rub, or gallop; no edema; no JVD  Procedural Access Site: Clean, dry, intact, without hematoma  Respiratory: Clear to auscultation bilaterally  Gastrointestinal: Soft, non-tender, non-distended, BS+  Musculoskeletal: No clubbing or joint deformity   Neurologic: No focal weakness  Lymphatic: No lymphadenopathy  Psychiatry: AAOx3 with appropriate mood and affect  Skin: No rashes, ecchymoses, or cyanosis    Cardiovascular Diagnostic Testing:  ECG:    Echo: < from: TTE with Doppler (w/Cont) (21 @ 17:02) >  ------------------------------------------------------------------------  Dimensions:    Normal Values:  LA:     4.6    2.0 - 4.0 cm  Ao:     3.0    2.0 - 3.8 cm  SEPTUM: 1.0    0.6 - 1.2 cm  PWT:    1.0 0.6 - 1.1 cm  LVIDd:  4.1    3.0 - 5.6 cm  LVIDs:  3.6    1.8 - 4.0 cm  Derived variables:  LVMI: 80 g/m2  RWT: 0.48  Fractional short: 12 %  EF (Visual Estimate): 55-60 %  EF (Hernandez Rule): 56 %Doppler Peak Velocity (m/sec):  MV=1.6 AoV=1.1  ------------------------------------------------------------------------  Observations:  Mitral Valve: An annuloplasty ring is seen in the mitral  position.. Mild mitral regurgitation.  Peak mitral valve  gradient equals 11 mm Hg, mean transmitral valve gradient  equals 5 mm Hg, which is probably normal in the setting of  a An annuloplasty ring is seen in the mitral position..  Aortic Valve/Aorta: Calcified trileaflet aortic valve with  normal opening. Peak transaortic valve gradient equals 5 mm  Hg. Moderate-severe aortic regurgitation. Peak left  ventricular outflow tract gradient equals 3 mm Hg.  Aortic Root: 3 cm.  LVOT diameter: 1.9 cm.  Left Atrium: Mildly dilated left atrium.  LA volume index =  38 cc/m2.  Left Ventricle: Endocardial visualization enhanced with  intravenous injection of Ultrasonic Enhancing Agent  (Definity). Difficult assesment of EF in the setting of  tachycardia.  Overall preserved left ventricular systolic  function. Small territory isolated at the apex that appers  aneurysmal.  Normal left ventricular internal dimensions  and wall thicknesses. Increased E/e'  is consistent with  elevated left ventricular filling pressure.  Right Heart: Right atrial enlargement. Right ventricula  rmild enlargement with decreased right ventricular systolic  function. Tricuspid valve not well visualized.  Moderate-severe tricuspid regurgitation. Pulmonic valve not  well visualized.  Pericardium/Pleura: Normal pericardium with no pericardial  effusion.  Hemodynamic: Estimated right atrial pressure is 8 mm Hg.  Estimated right ventricular systolic pressure equals 49 mm  Hg, assuming right atrial pressure equals 8 mm Hg,  consistent with mild pulmonary hypertension.  ------------------------------------------------------------------------  Conclusions:  1. An annuloplasty ring is seen in the mitral position..  Mild mitral regurgitation.  Peak mitral valve gradient  equals 11 mm Hg, mean transmitral valve gradient equals 5  mm Hg, which is probably normal in the setting of a An  annuloplasty ring is seen in the mitral position..  2. Calcified trileaflet aortic valve with normal opening.  Moderate-severe aortic regurgitation.  3. Normal left ventricular internal dimensions and wall  thicknesses.  4. Endocardial visualization enhanced with intravenous  injection of Ultrasonic Enhancing Agent (Definity).  Difficult assesment of EF in the setting of tachycardia.  Overall preserved left ventricular systolic function. Small  territory isolated at the apex that appers aneurysmal.  5. Increased E/e'  is consistent with elevated left  ventricular filling pressure.  6. Right ventricula rmild enlargement with decreased right  ventricular systolic function.  7. Tricuspid valve not well visualized. Moderate-severe  tricuspidregurgitation.  8. Estimated pulmonary artery systolic pressure equals 49  mm Hg, assuming right atrial pressure equals 8 mm Hg,  consistent with mild pulmonary pressures.  *** Compared with echocardiogram of 2021, tachycardia  limiting LV function in the setting of moderate to severe  AR, TR.  ------------------------------------------------------------------------  Confirmed on  2021 - 08:07:27 by Sarah Smith M.D.  ------------------------------------------------------------    < end of copied text >      Stress Testing:    Cath:    Interpretation of Telemetry: NSR    Imaging:    Labs:                        8.8    12.15 )-----------( 349      ( 2021 07:10 )             30.0     07-16    139  |  103  |  44<H>  ----------------------------<  123<H>  4.2   |  20<L>  |  1.83<H>    Ca    9.1      2021 06:59  Phos  4.3     07-16  Mg     2.2     07-16    TPro  6.8  /  Alb  3.7  /  TBili  0.4  /  DBili  x   /  AST  96<H>  /  ALT  257<H>  /  AlkPhos  176<H>            Serum Pro-Brain Natriuretic Peptide: 92949 pg/mL ( @ 21:41)  Serum Pro-Brain Natriuretic Peptide: 18493 pg/mL ( @ 11:28)        Thyroid Stimulating Hormone, Serum: 3.07 uIU/mL ( @ 03:35)   22 y/o F, possible , pt with PMHx of Hypothyroidism, PTSD, Depression, Miscarriages (x2) and PSHx of Cholecystectomy, arrives to the ED c/o NBNB vomiting episodes and nausea for 3 days. Pt states that she took a home pregnancy test 3 times; all + results. Pt has not seen her PMD. Pt reports that she is sexually active without protection. LNMP: 2018. Pt notes that this is her 6th total pregnancy. No hx of STDs. Denies fever, chills, abd pain, diarrhea, neck pain, back pain, HA, dizziness, CP, SOB, numbness/tingling/weakness, urinary sx or any other complaints. Allergic to Guanfacine and Lamictal. 22 y/o F, , pt with PMHx of Hypothyroidism, PTSD, Depression, Miscarriages (x2) and PSHx of Cholecystectomy, arrives to the ED c/o NBNB vomiting episodes and nausea for 3 days. Pt states that she took a home pregnancy test 3 times; all + results. Pt has not seen her PMD. Pt reports that she is sexually active without protection. LNMP: 2018.  No hx of STDs. Denies fever, chills, headache, cough, chest pain, sob, abd pain, uirnay symptoms, pelvic pain/discharge, numbness/tingling/weakness, recent travel, sick contact, social history, Allergic to Guanfacine and Lamictal.

## 2021-11-19 NOTE — ED PROVIDER NOTE - CONSTITUTIONAL NEGATIVE STATEMENT, MLM
Pt is calling to request update on refill   States she DOES have enough for the following 3 days    no fever and no chills.

## 2022-04-15 NOTE — ED PROVIDER NOTE - MEDICAL DECISION MAKING DETAILS
7 years ago multiple lacerations if self injurious beahvior, suture, tatnus, psych, sedation for aggitation as needed. 7 years ago - WNL as per pt

## 2022-08-30 NOTE — ED PROVIDER NOTE - HEME LYMPH, MLM
No adenopathy or splenomegaly. No cervical or inguinal lymphadenopathy. Intermediate Repair Preamble Text (Leave Blank If You Do Not Want): Undermining was performed with blunt dissection.

## 2022-09-02 NOTE — ED BEHAVIORAL HEALTH ASSESSMENT NOTE - REMOTE MEMORY
[address] [address] \[facility]\"" [address]  Dept: [phone]    9/2/2022    CHIEF COMPLAINT    Chief Complaint   Patient presents with    Annual Exam       HPI    Stephen Guzman is a 38 y.o. male who presents   Chief Complaint   Patient presents with    Annual Exam     HPI    Appointment for routine physical.  Patient also wishes to discuss back pain and left shoulder pain. Back pain and left shoulder pain- noting that a couple of months ago he was walking around with his phone tucked between his phone on his shoulder to his ear. This was for 6-10 minutes. There was some soreness right away, but was very painful upon waking the next    He has significant left sided neck pain, thoracic pain, across the low back (L more than R) and left shoulder pain. Pain extends down left arm down forearm just below the elbow. Intermittent numbness and tingling with certain movements. Muscle spasms. He has been taking hot showers, ibuprofen, stretching and flexeril HS as needed. Vitals:    09/02/22 1038   BP: 110/60   Pulse: 77   SpO2: 98%   Weight: 155 lb 12.8 oz (70.7 kg)       Wt Readings from Last 3 Encounters:   09/02/22 155 lb 12.8 oz (70.7 kg)   08/31/21 145 lb 6.4 oz (66 kg)   08/03/21 147 lb 12.8 oz (67 kg)     BP Readings from Last 3 Encounters:   09/02/22 110/60   08/31/21 98/60   08/03/21 130/70       REVIEW OF SYSTEMS    Review of Systems   Constitutional: Negative. Negative for chills, fatigue and fever. Eyes: Negative. Negative for visual disturbance. Respiratory:  Positive for cough (Chronic from smoking ) and shortness of breath (chronic from smoking). Cardiovascular: Negative. Negative for chest pain and palpitations. Gastrointestinal:  Positive for blood in stool (history of hemerroids ). Negative for abdominal distention, abdominal pain, constipation, diarrhea, nausea and vomiting. Genitourinary: Negative. Musculoskeletal:  Positive for arthralgias, back pain and neck pain. Chronic back pain and recent left knee injury   Neurological:  Positive for headaches (Intermittently. Triggered by food and water. ). Negative for dizziness. Psychiatric/Behavioral: Negative. Negative for dysphoric mood. The patient is not nervous/anxious.       PAST MEDICAL HISTORY    Past Medical History:   Diagnosis Date    Chronic back pain     Headache     Hypoglycemic disorder     noted as a child       FAMILY HISTORY    Family History   Problem Relation Age of Onset    Heart Disease Mother         current work-up    No Known Problems Father         unknown health hx    Diabetes Maternal Grandmother     Heart Attack Maternal Grandmother 62        still living    Other Maternal Grandmother         COVID    No Known Problems Maternal Grandfather         unknown health hx    No Known Problems Paternal Grandmother         unknown health hx    No Known Problems Paternal Grandfather         unknown health hx       SOCIAL HISTORY    Social History     Socioeconomic History    Marital status: Single     Spouse name: None    Number of children: 2    Years of education: None    Highest education level: None   Occupational History    Occupation: self employed-  in construction   Tobacco Use    Smoking status: Former     Packs/day: 2.00     Years: 10.00     Pack years: 20.00     Types: Cigarettes     Start date: 6/10/1999     Quit date: 2009     Years since quittin.0    Smokeless tobacco: Never   Vaping Use    Vaping Use: Every day    Substances: THC    Devices: Disposable, Refillable tank   Substance and Sexual Activity    Alcohol use: Not Currently    Drug use: Yes     Frequency: 7.0 times per week     Types: Marijuana [employer])     Comment: some edibles     Social Determinants of Health     Financial Resource Strain: Low Risk     Difficulty of Paying Living Expenses: Not very hard   Food Insecurity: No Food Insecurity Worried About [facility] in the Last Year: Never true    [facility] in the Last Year: Never true       SURGICAL HISTORY    Past Surgical History:   Procedure Laterality Date    TOENAIL EXCISION Bilateral 1998    ingrown toenail surgery    WISDOM TOOTH EXTRACTION         CURRENT MEDICATIONS    Current Outpatient Medications   Medication Sig Dispense Refill    cyclobenzaprine (FLEXERIL) 10 MG tablet take 1 tablet by mouth three times a day if needed for muscle spasm 30 tablet 5    ibuprofen (ADVIL;MOTRIN) 800 MG tablet Take 1 tablet by mouth every 8 hours as needed for Pain 90 tablet 5     No current facility-administered medications for this visit. ALLERGIES    Allergies   Allergen Reactions    Seasonal        PHYSICAL EXAM   Physical Exam  Vitals and nursing note reviewed. Constitutional:       General: He is not in acute distress. Appearance: He is well-developed. He is not diaphoretic. Interventions: Face mask in place. HENT:      Head: Normocephalic. Right Ear: Hearing, tympanic membrane, ear canal and external ear normal.      Left Ear: Hearing, tympanic membrane, ear canal and external ear normal.      Nose: Nose normal. No mucosal edema. Eyes:      General:         Right eye: No discharge. Left eye: No discharge. Conjunctiva/sclera: Conjunctivae normal.      Pupils: Pupils are equal, round, and reactive to light. Neck:      Thyroid: No thyromegaly. Cardiovascular:      Rate and Rhythm: Normal rate and regular rhythm. Heart sounds: Normal heart sounds, S1 normal and S2 normal. No murmur heard. Pulmonary:      Effort: Pulmonary effort is normal. No respiratory distress. Breath sounds: Normal breath sounds. No wheezing. Abdominal:      General: There is no distension. Palpations: Abdomen is soft. Tenderness: There is no abdominal tenderness. Musculoskeletal:      Left shoulder: Tenderness and bony tenderness present.  Decreased range of motion. Decreased strength. Normal pulse. Cervical back: Neck supple. Tenderness present. No spasms. Pain with movement present. Decreased range of motion. Lymphadenopathy:      Cervical: No cervical adenopathy. Skin:     General: Skin is warm and dry. Findings: No erythema or rash. Neurological:      Mental Status: He is alert and oriented to person, place, and time. Psychiatric:         Behavior: Behavior normal. Behavior is cooperative. ASSESSMENT/PLAN  1. Routine physical examination  -     Lipid Panel; Future  -     CBC with Auto Differential; Future  -     Comprehensive Metabolic Panel; Future  -     Hemoglobin A1C; Future  -     TSH with Reflex; Future  -     VARICELLA ZOSTER VIRUS AB, IGG; Future  -     Vitamin D 25 Hydroxy; Future  -     Hepatitis C Antibody; Future  -     HIV Screen; Future  2. Acute pain of left shoulder  -     External Referral To Physical Therapy  -     cyclobenzaprine (FLEXERIL) 10 MG tablet; take 1 tablet by mouth three times a day if needed for muscle spasm, Disp-30 tablet, R-5Normal  -     ibuprofen (ADVIL;MOTRIN) 800 MG tablet; Take 1 tablet by mouth every 8 hours as needed for Pain, Disp-90 tablet, R-5Normal  3. Acute left-sided back pain, unspecified back location  -     External Referral To Physical Therapy  4. Encounter for screening for HIV  -     HIV Screen; Future  5. Need for hepatitis C screening test  -     Hepatitis C Antibody; Future  6. Screening for metabolic disorder  -     CBC with Auto Differential; Future  -     Comprehensive Metabolic Panel; Future  -     Hemoglobin A1C; Future  -     TSH with Reflex; Future  7. Lipid screening  -     Lipid Panel; Future  8. Encounter for vitamin deficiency screening  -     Vitamin D 25 Hydroxy; Future  9. Immunity status testing  -     VARICELLA ZOSTER VIRUS AB, IGG; Future     Will order routine labs as discussed above.   Patient has previously been given orders, but forgot to get them done.  Will refer patient back to prior physical therapy location for left shoulder and left-sided back pain. Refill for Flexeril provided. Patient advised to continue taking over-the-counter pain relievers, Flexeril as needed, utilizing heat/ice and doing home stretches    Jonathan received counseling on the following healthy behaviors: nutrition, exercise, and medication adherence  Reviewed prior labs and health maintenance  Continue current medications, diet and exercise. Discussed use, benefit, and side effects of prescribed medications. Barriers to medication compliance addressed. Patient given educational materials - see patient instructions  Was a self-tracking handout given in paper form or via [employer]? Yes    Requested Prescriptions     Signed Prescriptions Disp Refills    cyclobenzaprine (FLEXERIL) 10 MG tablet 30 tablet 5     Sig: take 1 tablet by mouth three times a day if needed for muscle spasm    ibuprofen (ADVIL;MOTRIN) 800 MG tablet 90 tablet 5     Sig: Take 1 tablet by mouth every 8 hours as needed for Pain       All patient questions answered. Patient voiced understanding. Quality Measures    Body mass index is 21.73 kg/m². Normal. Weight control planned discussed Healthy diet and regular exercise. BP: 110/60 Blood pressure is Normal. Treatment plan consists of No treatment change needed. No results found for: LDLCALC, LDLCHOLESTEROL, LDLDIRECT (goal LDL reduction with dx if diabetes is 50% LDL reduction)      PHQ Scores 9/2/2022 8/3/2021 6/10/2019   PHQ2 Score 0 0 0   PHQ9 Score 0 0 0     Interpretation of Total Score Depression Severity: 1-4 = Minimal depression, 5-9 = Mild depression, 10-14 = Moderate depression, 15-19 = Moderately severe depression, 20-27 = Severe depression     Return if symptoms worsen or fail to improve.     (Please note that portions of this note were completed with a voice recognition program. Efforts were made to edit the dictations but occasionally words are mis-transcribed.)      Electronically signed by JEREMIAH Bunn CNP on 9/2/22 at 11:15 AM KELLY Normal

## 2023-01-15 NOTE — ED PROVIDER NOTE - DISPOSITION TYPE
Pt to OR for extraction of teeth and incision and drainage of right masseteric space infection   All risks and benefits of the procedure discussed with the patient including jaw fracture given loss of bone around 3rd molar sites, she understands  Discussed soft diet x 4 weeks    Boone Summers DDS, MD
DISCHARGE

## 2023-04-03 NOTE — ED PROVIDER NOTE - PSYCHIATRIC, MLM
Working HTN report:     Called to discuss scheduling appointment and to get updated BP reading.  No answer, left message.    Alert and oriented to person, place, time/situation. normal mood and affect. no apparent risk to self or others.

## 2023-04-10 NOTE — ED PROVIDER NOTE - CROS ED EYES ALL NEG
negative... Calcipotriene Counseling:  I discussed with the patient the risks of calcipotriene including but not limited to erythema, scaling, itching, and irritation.

## 2023-08-16 NOTE — ED ADULT NURSE NOTE - NS ED NURSE REPORT GIVEN TO FT
NORMAL   
Dad called to follow up with Pt swallowing toy. Was concerned because she has not had a bowel movement since swallowing toy on Sunday. Is not having any discomfort or abdominal pain. No vomiting, eating and drinking as usual. Wanted to see if they should give her anything to help the toy pass. Advised dad per Dr. Carreon, the constipation is most like unrelated to the toy. She is ok as long as she is not vomiting or having abdominal pain, the toy should pass in 2 weeks time. To come in for OV if constipation is a reoccurring issue. Let dad know I would also update you.   
Aranza RN

## 2023-10-20 NOTE — ED BEHAVIORAL HEALTH ASSESSMENT NOTE - COLLATERAL SOURCE
Render Risk Assessment In Note?: no
Detail Level: Simple
Additional Notes: Performing Provider: Cameron Richards Provider (if applicable):\\nProcedure: MOHS\\nSurgical Location:medial posterior neck\\nDiagnosis (per pathology report): BCC\\nSize of lesion (size provided on pathology report):\\n\\nSee a physician for any heart conditions (If yes, who and for what): N\\nArtificial Heart Valves:N\\nMitral valve prolapse:N\\nHeart Murmur:N\\nPacemaker:N\\nDefibrillator:N\\nArtificial joints (if yes, indicate location and year): N\\nDoes the patient pre-op medicate with antibiotics (if yes, what medication): N\\n**Pharmacy: N\\nHistory of renal disease or on dialysis:N\\nHistory of liver disease:N\\nHistory of bleeding disorder:N\\nLow platelet count (if the patient can provider): N\\nWill patient discontinue blood thinners, including NSAIDS (Advil, Motrin, ibuprofen & fish oil) (discontinue only by provider order):N\\nImmunosuppressed:N\\nPatient verbalizes understanding of pre-operative instructions: Y\\n\\nNotes (if applicable):
Professional collateral

## 2024-06-11 NOTE — ED ADULT TRIAGE NOTE - ESI TRIAGE ACUITY LEVEL, MLM
3 [FreeTextEntry1] : A: 84-year-old with vulvovaginal atrophy, high risk HPV, abnormal breast imaging, dense breasts  P: GYN exam done Pap smear done Pain and bleeding precautions Referral for mammo/sono breast given Encourage healthy diet and exercise Follow-up 6 months for repeat Pap and exam or as needed

## 2024-07-04 NOTE — ED ADULT NURSE NOTE - ATTEMPT TO OOB
Team Note Due:  Tuesday    Assessment/Intervention/Current Symtoms and Care Coordination:  Chart review and met with team, discussed pt progress, symptomology, and response to treatment.  Discussed the discharge plan and any potential impediments to discharge.    Met with pt to discuss discharge plan for tomorrow. Pt states her son will pick her up and requested writer contact him to discuss discharge time and provide building address. She feels good about going home tomorrow with supports in place. She's aware she will be contacted by the [facility] to schedule a start date if that's not scheduled before she discharges tomorrow.     Spoke with pt's son to discuss discharge. He will be flying into [place] tomorrow and will be able to pick pt up around 3:30pm. Provided building address and discussed scheduled appts/pending IOP start.     Discharge Plan or Goal:  Discharge to home with OP supports     Barriers to Discharge:  Plan for discharge on 7/5     Referral Status:  PCP  Psychiatry  [facility]     Legal Status:  Voluntary (stay of commitment)  [facility]: [place]  File Number: [id number]  Start and expiration date of commitment: 7/3/24-1/3/25    Tricia Brothers (PPS):  [phone] (office)  [phone] (cell)  [email or web address]    Contacts:  Tom Mendoza (son): [phone]       Upcoming Meetings and Dates/Important Information and next steps:     no
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
## 2024-09-09 NOTE — ED ADULT TRIAGE NOTE - ADDITIONAL SAFETY/BANDS...
Central Prior Authorization Team  Phone: [phone]    PA Initiation    Medication: PROAIR RESPICLICK 108 (90 BASE) MCG/ACT IN AEPB  Insurance Company: [employer] Non-Specialty PA's - Phone [phone] Fax [phone]  Pharmacy Filling the Rx: [id number] - [place], [place] - [address] [facility]  Filling Pharmacy Phone: [phone]  Filling Pharmacy Fax:    Start Date: 9/9/2024     Principal Discharge DX:	Asthma exacerbation  Goal:	nebs  Instructions for follow-up, activity and diet:	inhalers  Secondary Diagnosis:	Diabetes mellitus type 2 in obese  Goal:	fs coverage and tresiba  Secondary Diagnosis:	H/O ovarian cystectomy  Goal:	follow up gyn  Secondary Diagnosis:	Migraine aura without headache  Goal:	tylenol  Secondary Diagnosis:	Obesity (BMI 30-39.9)  Goal:	diet  Secondary Diagnosis:	Polycystic disease, ovaries  Goal:	follow up gyn  Secondary Diagnosis:	Essential hypertension, hypertension with unspecified goal  Goal:	cardizem Additional Safety/Bands:

## 2024-10-30 NOTE — ED PROVIDER NOTE - CROS ED GI ALL NEG
Patient called hospital stating he had a poor experience and would like to speak about the next step in addressing his complaints/issues.  Due to calling after hours at 0030, patient was referred to the patient advocate and offered the phone number.  Patient stated that he did not have a pen and paper to write down the number so he will call again during business hours.      Tiffanie Donato RN 10/30/2024 @ 0100  Clinical Nurse Manager   - - -

## 2024-11-06 NOTE — ED BEHAVIORAL HEALTH ASSESSMENT NOTE - AFFECT CONGRUENCE
PAST SURGICAL HISTORY:  H/O Achilles tendon repair RIGHT    H/O hernia repair UMBILICAL    H/O knee surgery BILAT MENISCUS     Congruent

## 2024-12-09 NOTE — ED PROVIDER NOTE - DURATION
patient calling in regards to patient is asking if another urologist can do his vasectomy sooner.  Please advise me    patient requesting call back on cell number [phone].   
patient calling in regards to patient is asking if another urologist can do his vasectomy sooner.  Please advise me    patient requesting call back on cell number [phone].   
week(s)

## 2024-12-27 NOTE — ED ADULT NURSE NOTE - CAS EDN DISCHARGE INTERVENTIONS
CHIEF COMPLAINT:    Chief Complaint   Patient presents with    Ear Pain     Left ear pain x5 days - hurts to touch     Starting with ear pain this monday; positive nasal congesiton  No cough, no SOB or wheezing.   Decreased appetite, no V or D.   No fever.   Was at water park the Friday before or 3 days before ear pain started.   Mother has been using Ibuprofen --helping some.   Not letting mother touch outiside of ear  + c/o HA this morning.       HISTORY OF PRESENT ILLNESS:  Kelley a 10 year old female who presents with a {numbers 0-5:10} {DURATION W/ DAY DEFAULT:973534::\"day(s)\"} history of {uri sx :471489}. {fever/chills:436609}     {other sx:460279}           Recent illnesses: {[facility] RECENT ILLNESS NONE DEFAULT:295590::\"none\"}  Sick contacts: {CONTACTS W/ ILLNESS:573916}     EXAMINATION:      Visit Vitals  Pulse 88   Temp 98 °F (36.7 °C) (Temporal)   Wt 35.5 kg (78 lb 3.2 oz)       HEENT:   Eyes -{EYE EXAM:838291::\"PERRLA, EOMI.  Conjunctivae and lids normal, sclera anicteric.  \"}              Nose - {NOSE BRIEF EXAM:316::\"normal\"}              Ears - {[facility]ospitals[facility] SICK EAR:852255}               Throat - {[facility]ospitalsK SICK THROAT:305140}              Sinuses - {:213144::\"non tender\"}  NECK: Good range of motion, supple. No adenopathy.        LUNGS: {LUNGS EXAM - PED:386426::\"clear to auscultation and percussion\"}  HEART: {HEART BRIEF EXAM:066356::\"RRR.  S1, S2, no gallops or murmurs.\"}  ABDOMEN: {ABD:601::\"soft, non-tender, BS normal, no masses or [id number] noted\"}  EXTREMITIES:  Warm, dry, without abnormalities.  NEUROLOGIC:  Normal tone, bulk, strength.  SKIN: Warm, dry, well-perfused. CRT (Capillary refill time) less than 3 seconds. No rash, or other lesions noted.     ASSESSMENT: {uri dx:081061}    PLAN: Discussed with parents the following:   {brim mma uri rx:153876}       {ST LAG F/U:692730}          IV discontinued, cath removed intact

## 2025-01-30 NOTE — ED PROVIDER NOTE - CPE EDP ENMT NORM
Problem: Pain - Adult  Goal: Verbalizes/displays adequate comfort level or baseline comfort level  Outcome: Progressing     Problem: Safety - Adult  Goal: Free from fall injury  Outcome: Progressing     Problem: Discharge Planning  Goal: Discharge to home or other facility with appropriate resources  Outcome: Progressing     Problem: Chronic Conditions and Co-morbidities  Goal: Patient's chronic conditions and co-morbidity symptoms are monitored and maintained or improved  Outcome: Progressing     Problem: Nutrition  Goal: Nutrient intake appropriate for maintaining nutritional needs  Outcome: Progressing   The patient's goals for the shift include      The clinical goals for the shift include patient will have better mucocytis control this shift         normal...

## 2025-04-11 NOTE — ED ADULT NURSE NOTE - WEIGHT IN KG
Screen  04/11/2026    Colorectal Cancer Screen  05/20/2027    Lipids  04/11/2030    Respiratory Syncytial Virus (RSV) Pregnant or age 60 yrs+ (1 - 1-dose 75+ series) 08/19/2037    Hepatitis C screen  Completed    Hepatitis A vaccine  Aged Out    Hepatitis B vaccine  Aged Out    Hib vaccine  Aged Out    Polio vaccine  Aged Out    Meningococcal (ACWY) vaccine  Aged Out    Meningococcal B vaccine  Aged Out    Prostate Specific Antigen (PSA) Screening or Monitoring  Discontinued           Assessment & Plan  Primary hypertension    stable  Continue blood pressure medication  Orders:    hydroCHLOROthiazide (HYDRODIURIL) 25 MG tablet; Take 1 tablet by mouth every morning    Well adult exam    Continue to exercise  Continue medication for hypertension           Return in about 6 months (around 10/11/2025) for hypertension 30 min.           --Jc Milner MD       83.9